# Patient Record
Sex: MALE | Race: WHITE | NOT HISPANIC OR LATINO | Employment: FULL TIME | URBAN - METROPOLITAN AREA
[De-identification: names, ages, dates, MRNs, and addresses within clinical notes are randomized per-mention and may not be internally consistent; named-entity substitution may affect disease eponyms.]

---

## 2024-03-22 ENCOUNTER — OFFICE VISIT (OUTPATIENT)
Dept: HEMATOLOGY/ONCOLOGY | Facility: CLINIC | Age: 44
End: 2024-03-22
Payer: COMMERCIAL

## 2024-03-22 ENCOUNTER — TELEPHONE (OUTPATIENT)
Dept: HEMATOLOGY/ONCOLOGY | Facility: CLINIC | Age: 44
End: 2024-03-22

## 2024-03-22 VITALS
BODY MASS INDEX: 28.61 KG/M2 | WEIGHT: 230.13 LBS | DIASTOLIC BLOOD PRESSURE: 88 MMHG | HEIGHT: 75 IN | RESPIRATION RATE: 18 BRPM | TEMPERATURE: 98 F | HEART RATE: 89 BPM | SYSTOLIC BLOOD PRESSURE: 136 MMHG

## 2024-03-22 DIAGNOSIS — C80.1: Primary | ICD-10-CM

## 2024-03-22 DIAGNOSIS — C20 ADENOCARCINOMA OF RECTUM: ICD-10-CM

## 2024-03-22 DIAGNOSIS — D53.9 NUTRITIONAL ANEMIA: ICD-10-CM

## 2024-03-22 DIAGNOSIS — D50.0 IRON DEFICIENCY ANEMIA DUE TO CHRONIC BLOOD LOSS: ICD-10-CM

## 2024-03-22 PROCEDURE — 3075F SYST BP GE 130 - 139MM HG: CPT | Mod: CPTII,S$GLB,, | Performed by: INTERNAL MEDICINE

## 2024-03-22 PROCEDURE — 3079F DIAST BP 80-89 MM HG: CPT | Mod: CPTII,S$GLB,, | Performed by: INTERNAL MEDICINE

## 2024-03-22 PROCEDURE — 99204 OFFICE O/P NEW MOD 45 MIN: CPT | Mod: S$GLB,,, | Performed by: INTERNAL MEDICINE

## 2024-03-22 PROCEDURE — 3008F BODY MASS INDEX DOCD: CPT | Mod: CPTII,S$GLB,, | Performed by: INTERNAL MEDICINE

## 2024-03-22 RX ORDER — TESTOSTERONE CYPIONATE 200 MG/ML
1 INJECTION, SOLUTION INTRAMUSCULAR
COMMUNITY

## 2024-03-22 RX ORDER — OMEPRAZOLE 40 MG/1
1 CAPSULE, DELAYED RELEASE ORAL DAILY PRN
COMMUNITY
Start: 2024-02-13

## 2024-03-22 RX ORDER — LOSARTAN POTASSIUM 100 MG/1
1 TABLET ORAL DAILY
COMMUNITY
Start: 2024-02-13

## 2024-03-22 NOTE — LETTER
March 30, 2024        Oliver Hatch MD  [facility] [address] [place] [address]             [facility] - Hematology Oncology  [address] [address]  Phone: [phone]  Fax: [phone]   Patient: Yong Arenas   MR Number: [id number]   YOB: 1980   Date of Visit: 3/22/2024       Dear Dr. Hatch:    Thank you for referring Yong Arenas to me for evaluation. Below are the relevant portions of my assessment and plan of care.              If you have questions, please do not hesitate to call me. I look forward to following Yong along with you.    Sincerely,      ELMO Samayoa MD           CC    No Recipients

## 2024-03-25 ENCOUNTER — OFFICE VISIT (OUTPATIENT)
Dept: RADIATION ONCOLOGY | Facility: CLINIC | Age: 44
End: 2024-03-25
Payer: COMMERCIAL

## 2024-03-25 ENCOUNTER — SOCIAL WORK (OUTPATIENT)
Dept: HEMATOLOGY/ONCOLOGY | Facility: CLINIC | Age: 44
End: 2024-03-25

## 2024-03-25 VITALS
OXYGEN SATURATION: 98 % | DIASTOLIC BLOOD PRESSURE: 92 MMHG | WEIGHT: 231.19 LBS | HEART RATE: 80 BPM | SYSTOLIC BLOOD PRESSURE: 134 MMHG | RESPIRATION RATE: 16 BRPM | BODY MASS INDEX: 28.9 KG/M2

## 2024-03-25 DIAGNOSIS — C20 ADENOCARCINOMA OF RECTUM: Primary | ICD-10-CM

## 2024-03-25 DIAGNOSIS — C80.1: ICD-10-CM

## 2024-03-25 PROCEDURE — 99205 OFFICE O/P NEW HI 60 MIN: CPT | Mod: S$GLB,,, | Performed by: RADIOLOGY

## 2024-03-25 PROCEDURE — 1160F RVW MEDS BY RX/DR IN RCRD: CPT | Mod: CPTII,S$GLB,, | Performed by: RADIOLOGY

## 2024-03-25 PROCEDURE — 3075F SYST BP GE 130 - 139MM HG: CPT | Mod: CPTII,S$GLB,, | Performed by: RADIOLOGY

## 2024-03-25 PROCEDURE — 3080F DIAST BP >= 90 MM HG: CPT | Mod: CPTII,S$GLB,, | Performed by: RADIOLOGY

## 2024-03-25 PROCEDURE — 3008F BODY MASS INDEX DOCD: CPT | Mod: CPTII,S$GLB,, | Performed by: RADIOLOGY

## 2024-03-25 PROCEDURE — 1159F MED LIST DOCD IN RCRD: CPT | Mod: CPTII,S$GLB,, | Performed by: RADIOLOGY

## 2024-03-25 NOTE — Clinical Note
Dr. Samayoa-- Met with this young gent. Agree with labs, CEA, MRI (+rectal protocol) and PET. Sounds like ya'll are [place] [facility] [place] Dr. Castellanos for PORT (wife prior pt)? Assume not MMR deficient or metastatic, plan will be for CRT followed by chemo. This is low lying; this will be best sequence to potentially spare him APR :/ I'll be ready by 4/8. Thanks-- Ten

## 2024-03-25 NOTE — PROGRESS NOTES
Yong Arenas  [id number]  1980  3/25/2024  [facility]  [address] [place] [address]    REASON FOR CONSULTATION: cTxNxMx mod diff adenoca of low rectum    TREATMENT GOAL: neoadjuvant    HISTORY OF PRESENT ILLNESS:   Yong Arenas is a 44 y.o. male former smoker, social ETOH drinker who presented with longstanding bright red blood per rectum.    Colonoscopy revealed a villous, fungating, sessile, infiltrative nonobstructing mass in the low rectum measuring 5 cm in length, located just above the anus, partially removed.  Pathology returned moderately differentiated adenocarcinoma arising in tubulovillous adenoma with extensive high-grade dysplasia; MMR status pending.    3/22 Dr. Samayoa (per pt): MRI and PET planned. PORT: Dr. Castellanos. Start date 4/8    CEA:    Caris pending.    Denies fever, chills, chest pain, shortness of breath, cough or hemoptysis.  Continues with bright red blood per rectum.  Denies right upper quadrant pain.  He works in a bakery.  He quit smoking 1 year ago.  He does drink alcohol socially throughout the week.  Presents with his wife who is offering excellent support.    Review of systems otherwise negative unless indicated in HPI.    No past medical history on file.  No past surgical history on file.  Social History     Socioeconomic History    Marital status:    Tobacco Use    Smoking status: Former     Types: Cigarettes    Smokeless tobacco: Never     Family History   Problem Relation Age of Onset    Prostate cancer Father        PRIOR HISTORY OF CHEMOTHERAPY OR RADIOTHERAPY: Please see HPI for patients prior oncologic history.    Medication List with Changes/Refills   Current Medications    LOSARTAN (COZAAR) 100 MG TABLET    Take 1 tablet by mouth once daily.    OMEPRAZOLE (PRILOSEC) 40 MG CAPSULE    Take 1 capsule by mouth once daily.    TESTOSTERONE CYPIONATE (DEPOTESTOTERONE CYPIONATE) 200 MG/ML INJECTION    Inject 1 mL into the muscle every 14  (fourteen) days.     Review of patient's allergies indicates:  No Known Allergies    QUALITY OF LIFE: 100%- Normal, No Complaints, No Evidence of Disease    Vitals:    03/25/24 0821   BP: (!) 134/92   Pulse: 80   Resp: 16   SpO2: 98%   Weight: 104.9 kg (231 lb 3.2 oz)   PainSc:   4   PainLoc: Generalized     Body mass index is 28.9 kg/m².    PHYSICAL EXAM:   GENERAL: alert; in no apparent distress.   HEAD: normocephalic, atraumatic.  EYES: pupils are equal, round, reactive to light and accommodation. Sclera anicteric. Conjunctiva not injected.   NOSE/THROAT: no nasal erythema or rhinorrhea. Oropharynx pink, without erythema, ulcerations or thrush.   NECK: no cervical motion rigidity; supple with no masses.    CHEST: Patient is speaking comfortably on room air with normal work of breathing without using accessory muscles of respiration.  CARDIOVASCULAR: regular rate and rhythm  ABDOMEN: soft, nontender, nondistended.   MUSCULOSKELETAL: no tenderness to palpation along the spine or scapulae. Normal range of motion.  NEUROLOGIC: cranial nerves II-XII intact bilaterally. Strength 5/5 in bilateral upper and lower extremities. No sensory deficits appreciated. Normal gait.  EXTREMITIES: no clubbing, cyanosis, edema.  SKIN: no erythema, rashes, ulcerations noted.     REVIEW OF IMAGING/PATHOLOGY/LABS: Please see HPI. All images reviewed personally by dictating physician.     ASSESSMENT: Yong Arenas is a 44 y.o. male with stage cTxNxMx mod diff adenoca of low rectum  PLAN:  Yong Arenas presents with longstanding history of bright red blood per rectum with colonoscopy revealing a low-lying rectal tumor measuring 5 cm in length, biopsy-proven moderately differentiated adenocarcinoma, MMR intact.  Patient met with Dr. Samayoa and has lab evaluation as well as completion staging studies of MRI (advise rectal protocol) and PET pending.  He will also need to meet with the colorectal surgeon at [facility] and plans to meet with  Dr. Castellanos for PORT placement.    Today our discussion centered around M0 circumstance.  Based on colonoscopy report, assume at least T2-3 tumor and will await MRI to clarify T and N staging.  Tumor appears to be very low-lying, potentially compromising his candidacy for LAR.  He will discuss further with colorectal surgery.  Today we discussed tentative plan for total neoadjuvant therapy.  I discussed sequencing of chemoradiation and full-dose chemotherapy in detail.  Benefits of upfront chemoradiation include control of bleeding and obstructive symptoms with more time for response until surgical evaluation (potentially sparing APR).  This comes up with the consequence of longer time until systemic control by chemotherapy.  Unless extensive adenopathy (or metastases) noted, tentative plan will be for upfront chemoradiation therapy.  Prescription will be 54 Gy in 30 fractions using IMRT to spare the surrounding bowel, bladder, bony pelvis.  Anticipate chemosensitization with 5 FU.  Recommend long course chemoradiation due to increased benefit for sphincter preservation.    Will cc Dr. Samayoa to coordinate care.  Anticipated start date 4/8.  Of note, awaiting MMR status--if deficient, may be a candidate for immunotherapy.    I carefully explained the process of simulation and treatment delivery with weekly physician visits. Patient wishes to proceed.     We discussed the risks and benefits of the above treatment and have gone over in detail the acute and late toxicities of radiation therapy to the pelvis, rectum. The patient expressed  understanding and has signed a consent form which is included in the patients chart.      The patient has our contact information and understands that they are free to contact us at any time with questions or concerns regarding radiation therapy.     DISPOSITION: RTC FOR CT SIM     I have personally seen and evaluated this patient with a high complexity diagnosis.      Greater than 60  minutes were dedicated to reviewing/interpreting pertinent laboratory/imaging/pathology as well as prior consultations; reviewing and performing history and physical; counseling patient on oncologic recommendations; documentation in the electronic medical record including ordering of additional tests and/or radiation treatment protocol; and coordination of care with physicians with referrals placed as appropriate.    PHYSICIAN: Tucker Arellano Jr, MD    Thank you for the opportunity to meet and consult with Yong Arenas.   Please feel free to contact me to discuss the above recommendation further.

## 2024-03-25 NOTE — PROGRESS NOTES
Yong Arenas is a 44 year old diagnosed with adenocarcinoma of rectum.  Patient is here today, accompanied by his wife, for a radiation consult with Dr. Arellano.  I met with patient to complete new patient orientation and the NCCN Distress Screening; patient indicated a rating of 0.  Patient was provided the number to access the [facility] financial assistance application.  Patient denied needing psychosocial support at this time.  I provided patient with the [facility] community events flyer and my contact information in the event supportive services are needed in the future.

## 2024-03-27 ENCOUNTER — TELEPHONE (OUTPATIENT)
Dept: HEMATOLOGY/ONCOLOGY | Facility: CLINIC | Age: 44
End: 2024-03-27

## 2024-03-27 DIAGNOSIS — C20 ADENOCARCINOMA OF RECTUM: Primary | ICD-10-CM

## 2024-03-27 NOTE — TELEPHONE ENCOUNTER
Spoke with pts spouse and informed her that I gave Dr Samayoa her message from yesterday and that I am waiting on orders from him and that I will get back with her ASAP. Advised that pts radiation appt can always be moved. Spouse verbalized understanding.

## 2024-03-30 ENCOUNTER — TELEPHONE (OUTPATIENT)
Dept: HEMATOLOGY/ONCOLOGY | Facility: CLINIC | Age: 44
End: 2024-03-30

## 2024-03-30 NOTE — PROGRESS NOTES
[facility] In Office Hematology Oncology Initial Encounter Note    3/22/24    Subjective:      Patient ID:   Yong Arenas  44 y.o. male  1980  Adan Hatch, Mariella Goldsmith Gray, MD's  [facility]      Chief Complaint:   Rectal cancer    HPI:  44 y.o. male has had rectal bleeding over the last 2-3 years.  BMs have become smaller.  He presented with symptoms of a prolapsed mass.  He had colonoscopy and was found to have a large mass at the rectum.  Biopsy returned moderately differentiated invasive adenocarcinoma arising from a tubulovillous adenoma.    Appetite is intact, weight is stable, he complains of easy fatigue.    Medications include Cozaar for hypertension, Prilosec for GERD, he did have his esophagus dilated approximately 4 years ago.  He does take testosterone injections q.2 weeks over the last 1-2 years per his primary physician Dr. Hatch.  He had 2 of 4 wisdom teeth removed in the past and a root canal x2.  He has not had any surgeries.      He smokes less than a half pack per day for 25 years, none x1 year.  He drinks beer, vodka, bourbon.  He denies allergies to medications.  He is a  and ulnar at [employer].  He does drink alkaline water.    He is traveling to [place] July 20th through July 24th.    His father had history of polyps, prostate cancer treated with radiation seeds.  His mother had cancer on her leg with swelling and was told that she had a liposarcoma.  He has a niece with history of leukemia who had stem cell transplant at age 22, she is 24 now.  He has 3 sisters alive and well and 2 sons alive and well age 23 and 21.    ROS:   GEN: normal without any fever, night sweats or weight loss  HEENT: normal with no HA's, sore throat, stiff neck, changes in vision  CV: normal with no CP, SOB, PND, PERRY or orthopnea  PULM: normal with no SOB, cough, hemoptysis, sputum or pleuritic pain  GI: See HPI  : normal with no hematuria, dysuria  BREAST: normal with no  "mass, discharge, pain  SKIN: normal with no rash, erythema, bruising, or swelling      Social History     Socioeconomic History    Marital status:    Tobacco Use    Smoking status: Former     Types: Cigarettes    Smokeless tobacco: Never         Current Outpatient Medications:     losartan (COZAAR) 100 MG tablet, Take 1 tablet by mouth once daily., Disp: , Rfl:     omeprazole (PRILOSEC) 40 MG capsule, Take 1 capsule by mouth once daily., Disp: , Rfl:     testosterone cypionate (DEPOTESTOTERONE CYPIONATE) 200 mg/mL injection, Inject 1 mL into the muscle every 14 (fourteen) days., Disp: , Rfl:           Objective:   Vitals:  Blood pressure 136/88, pulse 89, temperature 98 °F (36.7 °C), resp. rate 18, height 6' 3" (1.905 m), weight 104.4 kg (230 lb 1.6 oz).    Physical Examination:   GEN: no apparent distress, comfortable  HEAD: atraumatic and normocephalic  EYES: no pallor, no icteru  ENT: no pharyngeal erythema, external ears WNL; no nasal discharge  NECK: no masses, thyroid normal, trachea midline, no LAD/LN's, supple  CV: RRR with no murmur; normal pulse; normal S1 and S2; no pedal edema  CHEST: Normal respiratory effort; CTAB; normal breath sounds; no wheeze or crackles  ABDOM: nontender and nondistended; soft; no rebound/guarding, L/S NP   MUSC/Skeletal: ROM normal; no crepitus; joints normal   EXTREM: no clubbing, cyanosis, inflammation or swelling  SKIN: no rashes, lesions, ulcers, petechiae   : no cvat  NEURO: grossly intact; motor/sensory WNL;  no tremors  PSYCH: normal mood, affect and behavior  LYMPH: normal cervical, supraclavicular, axillary and groin LN's  BREASTS: L & R no palpable mass    Colonoscopy report from March 18, 2024 revealed a large frond like villous fungating mass at the rectum, 5 cm in length, incomplete resection was done, the lesion was just above the anus.  A small polyp was removed from the cecum and 3 small polyps were removed from the sigmoid colon follow-up colonoscopy is " due in 1 year.      Pathology report from [facility] dated March 19, 2024   [id number].  Small polyps were tubular adenoma and hyperplastic polyps.  The rectal lesion was moderately differentiated invasive adenocarcinoma arising from tubulovillous adenoma with extensive high-grade dysplasia.    PET scan and MRI scan of the pelvis have been ordered at [facility] imaging.  Assessment:   (1) 44 y.o. 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    (2) staging studies will include PET scan to check for metastatic disease, and to evaluate for lymphadenopathy in the pelvis.  MRI scan of the pelvis is being done to evaluate the local extent of the primary tumor in the pelvis.  CBC, CMP, CEA, ferritin, B12, and folate have been ordered.    (3) staging of the rectal cancer is in progress with the PET scan and MRI scan and will be determined clinically after the results of the studies are confirmed.    (4) treatment options can include low anterior resection and abdominal peritoneal resection, radiation will also be involved for local control, and chemotherapy will be given as a radiation sensitizer and also to try and decrease risk of systemic recurrence adjuvantly or katy adjuvantly.  Next generation sequencing studies to determine a role for immune oncology will be submitted.    (5) in the past surgery was usually done initially, followed by adjuvant radiation therapy and chemotherapy concurrently for local control, followed by systemic chemotherapy x6 months adjuvantly to reduce systemic recurrence.    In the last several years, our  treatment approach has changed, with Neoadjuvant radiation therapy and concurrent chemotherapy infusion being given initially, followed by Neoadjuvant  systemic chemotherapy q.2 weeks times 6-12 cycles, followed by surgical re-evaluation at a later date to include low anterior resection, abdominal  peritoneal resection, or observation.    He has an appointment to see Dr. Brandon Wolff, the surgical oncologist, at [facility].  Will make a referral for him to see Dr. Arellano of radiation therapy.  Dr. Tash Castellanos will place his port.    Anticipated start date would be April 8, 2024.  RT 4/5/24.

## 2024-04-02 ENCOUNTER — HOSPITAL ENCOUNTER (OUTPATIENT)
Dept: RADIOLOGY | Facility: HOSPITAL | Age: 44
Discharge: HOME | End: 2024-04-02
Attending: INTERNAL MEDICINE
Payer: COMMERCIAL

## 2024-04-02 DIAGNOSIS — C20 ADENOCARCINOMA OF RECTUM: ICD-10-CM

## 2024-04-02 LAB — GLUCOSE SERPL-MCNC: 82 MG/DL (ref 70–110)

## 2024-04-02 PROCEDURE — 78815 PET IMAGE W/CT SKULL-THIGH: CPT | Mod: TC,PN

## 2024-04-02 PROCEDURE — A9552 F18 FDG: HCPCS | Mod: PN | Performed by: INTERNAL MEDICINE

## 2024-04-02 PROCEDURE — 78815 PET IMAGE W/CT SKULL-THIGH: CPT | Mod: 26,PI,, | Performed by: RADIOLOGY

## 2024-04-02 PROCEDURE — A9585 GADOBUTROL INJECTION: HCPCS | Performed by: INTERNAL MEDICINE

## 2024-04-02 PROCEDURE — 25500020 PHARM REV CODE 255: Performed by: INTERNAL MEDICINE

## 2024-04-02 PROCEDURE — 72197 MRI PELVIS W/O & W/DYE: CPT | Mod: TC

## 2024-04-02 RX ORDER — FLUDEOXYGLUCOSE F18 500 MCI/ML
12 INJECTION INTRAVENOUS
Status: COMPLETED | OUTPATIENT
Start: 2024-04-02 | End: 2024-04-02

## 2024-04-02 RX ORDER — GADOBUTROL 604.72 MG/ML
9.5 INJECTION INTRAVENOUS
Status: COMPLETED | OUTPATIENT
Start: 2024-04-02 | End: 2024-04-02

## 2024-04-02 RX ADMIN — FLUDEOXYGLUCOSE F-18 13.2 MILLICURIE: 500 INJECTION INTRAVENOUS at 07:04

## 2024-04-02 RX ADMIN — GADOBUTROL 9.5 ML: 604.72 INJECTION INTRAVENOUS at 06:04

## 2024-04-02 NOTE — H&P (VIEW-ONLY)
Yong Arenas is an 44 y.o. male.  Patient with bloody stools.  Colonoscopy revealed carcinoma of the rectum at 5 cm.  He has been preoperatively seen by Dr. Arellano and Dr. Calabrese.  PET-CT and MRI done today, findings unknown  To be scheduled for combination chemotherapy radiation therapy preoperatively  Patient seen for port placement    Past Medical History:   Diagnosis Date    Adenocarcinoma of rectum (CMS/HCC)     Dysphagia        Allergies: Not on File    Active Problems:    * No active hospital problems. *    Blood pressure 133/87, pulse 84, resp. rate 18, weight 104.4 kg (230 lb 2.2 oz), SpO2 96%.    Review of Systems   Respiratory:  Negative for shortness of breath.    Cardiovascular:  Negative for chest pain.   Gastrointestinal:  Negative for abdominal pain.   Genitourinary:  Negative for difficulty urinating.       Physical Exam  Eyes:      Pupils: Pupils are equal, round, and reactive to light.   Cardiovascular:      Rate and Rhythm: Normal rate and regular rhythm.   Pulmonary:      Effort: Pulmonary effort is normal.      Breath sounds: Normal breath sounds.   Abdominal:      Palpations: Abdomen is soft.   Musculoskeletal:         General: Normal range of motion.      Cervical back: Neck supple.   Skin:     General: Skin is warm and dry.   Neurological:      Mental Status: He is alert and oriented to person, place, and time.         Assessment:  Patient with carcinoma of the rectum for preop neoadjuvant chemotherapy radiation protocol therapy.  Will place port for chemotherapy access Tuesday morning at [facility] on leave needle in place for same-day therapy      Plan:  Scheduled for port placement [facility] April 9th.  Needle be left in place so therapy can be instituted April 9th    Tash Castellanos  4/2/2024

## 2024-04-02 NOTE — H&P (VIEW-ONLY)
Yong Arenas is an 44 y.o. male.  Patient with bloody stools.  Colonoscopy revealed carcinoma of the rectum at 5 cm.  He has been preoperatively seen by Dr. Arellano and Dr. Calabrese.  PET-CT and MRI done today, findings unknown  To be scheduled for combination chemotherapy radiation therapy preoperatively  Patient seen for port placement    Past Medical History:   Diagnosis Date    Adenocarcinoma of rectum (CMS/HCC)     Dysphagia        Allergies: Not on File    Active Problems:    * No active hospital problems. *    Blood pressure 133/87, pulse 84, resp. rate 18, weight 104.4 kg (230 lb 2.2 oz), SpO2 96%.    Review of Systems   Respiratory:  Negative for shortness of breath.    Cardiovascular:  Negative for chest pain.   Gastrointestinal:  Negative for abdominal pain.   Genitourinary:  Negative for difficulty urinating.       Physical Exam  Eyes:      Pupils: Pupils are equal, round, and reactive to light.   Cardiovascular:      Rate and Rhythm: Normal rate and regular rhythm.   Pulmonary:      Effort: Pulmonary effort is normal.      Breath sounds: Normal breath sounds.   Abdominal:      Palpations: Abdomen is soft.   Musculoskeletal:         General: Normal range of motion.      Cervical back: Neck supple.   Skin:     General: Skin is warm and dry.   Neurological:      Mental Status: He is alert and oriented to person, place, and time.         Assessment:  Patient with carcinoma of the rectum for preop neoadjuvant chemotherapy radiation protocol therapy.  Will place port for chemotherapy access Tuesday morning at [facility] on leave needle in place for same-day therapy      Plan:  Scheduled for port placement [facility] April 9th.  Needle be left in place so therapy can be instituted April 9th    Tash Castellanos  4/2/2024

## 2024-04-02 NOTE — PROGRESS NOTES
PET Imaging Questionnaire    Are you a Diabetic? Recent Blood Sugar level? No    Are you anemic? Bone Marrow Stimulation Meds? No    Have you had a CT Scan, if so when & where was your last one? No    Have you had a PET Scan, if so when & where was your last one? No    Chemotherapy or currently on Chemotherapy? No    Radiation therapy? No    Surgical History: No past surgical history on file.     Have you been fasting for at least 6 hours? Yes    Is there any chance you may be pregnant or breastfeeding? No    Assay: 15.1 MCi@:7.49   Injection Site:lt ac     Residual: 1.92 mCi@: 7.51   Technologist: Caty Berry Injected:13.2mCi

## 2024-04-03 ENCOUNTER — DOCUMENTATION ONLY (OUTPATIENT)
Dept: RADIATION ONCOLOGY | Facility: CLINIC | Age: 44
End: 2024-04-03

## 2024-04-03 ENCOUNTER — HOSPITAL ENCOUNTER (OUTPATIENT)
Dept: PREADMISSION TESTING | Facility: HOSPITAL | Age: 44
Discharge: HOME | End: 2024-04-03
Attending: SURGERY
Payer: COMMERCIAL

## 2024-04-03 VITALS
BODY MASS INDEX: 28.6 KG/M2 | HEART RATE: 87 BPM | SYSTOLIC BLOOD PRESSURE: 131 MMHG | OXYGEN SATURATION: 97 % | WEIGHT: 230 LBS | DIASTOLIC BLOOD PRESSURE: 89 MMHG | TEMPERATURE: 98 F | HEIGHT: 75 IN | RESPIRATION RATE: 18 BRPM

## 2024-04-03 DIAGNOSIS — Z01.818 PREOP TESTING: Primary | ICD-10-CM

## 2024-04-03 PROCEDURE — 93010 ELECTROCARDIOGRAM REPORT: CPT | Mod: ,,, | Performed by: GENERAL PRACTICE

## 2024-04-03 PROCEDURE — 93005 ELECTROCARDIOGRAM TRACING: CPT | Performed by: GENERAL PRACTICE

## 2024-04-03 RX ORDER — IBUPROFEN 200 MG
200 TABLET ORAL EVERY 6 HOURS PRN
COMMUNITY

## 2024-04-03 RX ORDER — CEFAZOLIN SODIUM 2 G/50ML
2 SOLUTION INTRAVENOUS ONCE
Status: CANCELLED | OUTPATIENT
Start: 2024-04-03

## 2024-04-03 RX ORDER — LORATADINE AND PSEUDOEPHEDRINE SULFATE 5; 120 MG/1; MG/1
TABLET, EXTENDED RELEASE ORAL DAILY
COMMUNITY

## 2024-04-03 NOTE — DISCHARGE INSTRUCTIONS
To confirm, Your doctor has instructed you that surgery is scheduled for:  Tuesday 4/9/24    Pre-Op will call the afternoon prior to surgery between 4:00 and 6:00 PM with the final arrival time.  Monday 4/8/24     Please report to Registration at front of the hospital --it is best to park in the garage on [address]    Do not eat or drink anything after midnight the night before your surgery - THIS INCLUDES  WATER, GUM, MINTS AND CANDY.    YOU MAY BRUSH YOUR TEETH     TAKE APPROVED  MEDICATIONS WITH A SMALL SIP OF WATER THE MORNING OF YOUR PROCEDURE: See Medication list    PLEASE NOTE:  The surgery schedule has many variables which may affect the time of your surgery case.  Family members should be available if your surgery time changes.  Plan to be here the day of your procedure between 4-6 hours.    DO NOT TAKE THESE MEDICATIONS 5-7 DAYS PRIOR to your procedure or per your surgeon's request: ASPIRIN, ALEVE, ADVIL, IBUPROFEN,  ESTEFANY SELTZER, BC , FISH OIL , VITAMIN E, HERBALS  (May take Tylenol)        IMPORTANT INSTRUCTIONS    Do not smoke, vape or drink alcoholic beverages 24 hours prior to your procedure.  Shower the night before AND the morning of your procedure with a Chlorhexidine wash such as Hibiclens or Dial antibacterial soap from the neck down.    Do not get it on your face or in your eyes.  You may use your own shampoo and face wash. This helps your skin to be as bacteria free as possible.   Do not apply any deodorant, lotion or powder after you shower.   DO NOT remove hair from the surgery site.  Do not shave the incision site unless you are given specific instructions to do so.    Sleep in a bed with clean sheets.  Do not sleep with a pet in the bed.   If you wear contact lenses, dentures, hearing aids or glasses, bring a container to put them in during surgery and give to a family member for safe keeping.    Please leave all jewelry, piercing's and valuables at home.   Wear comfortable clothing that  is easy to remove and place back on after surgery.     Make arrangements in advance for transportation home by a responsible adult.    You must make arrangements for transportation, TAXI'S, UBER'S OR LYFTS ARE NOT ALLOWED.      If you have any questions about these instructions, call Pre-Op Admit  Nursing at [phone] , Pre-Op Day Surgery Unit at [phone]

## 2024-04-03 NOTE — PROGRESS NOTES
Pt called per Dr Calabrese to cancel sim  Dr Beck explained to pt wife about liver spots seen on scan and the need to change the treatment plan  Wife verbalized understanding

## 2024-04-03 NOTE — PRE ADMISSION SCREENING
Preadmit assessment complete. Review of patient health history and home medications. Questions answered. Patient/wife  voiced understanding. Chlorhexidine scrub given to patient for preop shower Preop education per AVS

## 2024-04-05 ENCOUNTER — OFFICE VISIT (OUTPATIENT)
Dept: HEMATOLOGY/ONCOLOGY | Facility: CLINIC | Age: 44
End: 2024-04-05
Payer: COMMERCIAL

## 2024-04-05 VITALS
WEIGHT: 227.13 LBS | BODY MASS INDEX: 28.24 KG/M2 | DIASTOLIC BLOOD PRESSURE: 87 MMHG | TEMPERATURE: 99 F | HEIGHT: 75 IN | RESPIRATION RATE: 16 BRPM | SYSTOLIC BLOOD PRESSURE: 136 MMHG | HEART RATE: 92 BPM

## 2024-04-05 DIAGNOSIS — C20 ADENOCARCINOMA OF RECTUM: Primary | ICD-10-CM

## 2024-04-05 DIAGNOSIS — K76.9 LIVER DISEASE, UNSPECIFIED: ICD-10-CM

## 2024-04-05 DIAGNOSIS — R93.2 ABNORMAL PET SCAN OF LIVER: ICD-10-CM

## 2024-04-05 PROCEDURE — 3079F DIAST BP 80-89 MM HG: CPT | Mod: CPTII,S$GLB,, | Performed by: INTERNAL MEDICINE

## 2024-04-05 PROCEDURE — 3075F SYST BP GE 130 - 139MM HG: CPT | Mod: CPTII,S$GLB,, | Performed by: INTERNAL MEDICINE

## 2024-04-05 PROCEDURE — 99214 OFFICE O/P EST MOD 30 MIN: CPT | Mod: S$GLB,,, | Performed by: INTERNAL MEDICINE

## 2024-04-05 PROCEDURE — 3008F BODY MASS INDEX DOCD: CPT | Mod: CPTII,S$GLB,, | Performed by: INTERNAL MEDICINE

## 2024-04-05 PROCEDURE — 1159F MED LIST DOCD IN RCRD: CPT | Mod: CPTII,S$GLB,, | Performed by: INTERNAL MEDICINE

## 2024-04-05 NOTE — PROGRESS NOTES
[facility] In Office Hematology Oncology Subsequent Encounter Note    4/5/24    Subjective:      Patient ID:   Yong Arenas  44 y.o. male  1980  Adan Hatch Albright, [place], Gray, MD's  [facility]      Chief Complaint:   Rectal cancer    HPI:  44 y.o. male has had rectal bleeding over the last 2-3 years.  BMs have become smaller.  He presented with symptoms of a prolapsed mass.  He had colonoscopy and was found to have a large mass at the rectum.  Biopsy returned moderately differentiated invasive adenocarcinoma arising from a tubulovillous adenoma.  MRI of the rectum showed this 3.8 cm polypoid lesion, probably the primary focus of malignancy.  There was thickening of the posterior and left lateral rectal wall.  Pelvic lymph node was up to 5 mm in size, pelvic lymphadenopathy was not seen.  The MRI scan was negative for significant local invasion.  PET scan showed low rectal carcinoma highly suspicious for metastatic disease to the liver tiny left pararectal lymph node is suspicious based on morphology but too small to characterize with PET.  A lesion is seen subcapsular in the dome of the liver with an SUV of 3.9.  Another lesion is seen in the left lobe of the liver at 1.5 cm and has an SUV of 4.3.    Will see if we can schedule an MRI of the liver for Wednesday, and see if the radiologist can biopsy 1 of these lesions for tissue diagnosis to confirm the presence of liver metastases.  At this point clinically it would appear to be stage IV disease.  Previously discussed with Dr. Beck.  For stage IV disease, would plan to go forward with FOLFIRI or FOLFOX with Avastin or Vectibix initially for 4-6 cycles, then evaluate for response, consider hepatic metastasectomy potentially.  MRI of liver may give us a more accurate number of liver metastases?    Radiation to the rectal mass and infusional 5 FU is deferred at this time until we get the liver MRI and biopsy if feasible and  clarify if we have stage IV disease here?    Appetite is intact, weight is stable, he complains of easy fatigue.    Medications include Cozaar for hypertension, Prilosec for GERD, he did have his esophagus dilated approximately 4 years ago.  He does take testosterone injections q.2 weeks over the last 1-2 years per his primary physician Dr. Hatch.  He had 2 of 4 wisdom teeth removed in the past and a root canal x2.  He has not had any surgeries.      He smokes less than a half pack per day for 25 years, none x1 year.  He drinks beer, vodka, bourbon.  He denies allergies to medications.  He is a  and ulnar at [employer].  He does drink alkaline water.    He is traveling to [place]  through .    His father had history of polyps, prostate cancer treated with radiation seeds.  His mother had cancer on her leg with swelling and was told that she had a liposarcoma.  He has a niece with history of leukemia who had stem cell transplant at age 22, she is 24 now.  He has 3 sisters alive and well and 2 sons alive and well age 23 and 21.    ROS:   GEN: normal without any fever, night sweats or weight loss  HEENT: normal with no HA's, sore throat, stiff neck, changes in vision  CV: normal with no CP, SOB, PND, PERRY or orthopnea  PULM: normal with no SOB, cough, hemoptysis, sputum or pleuritic pain  GI: See HPI  : normal with no hematuria, dysuria  BREAST: normal with no mass, discharge, pain  SKIN: normal with no rash, erythema, bruising, or swelling      Social History     Socioeconomic History    Marital status:    Tobacco Use    Smoking status: Former     Types: Cigarettes     Start date:      Quit date:      Years since quittin.2    Smokeless tobacco: Never   Substance and Sexual Activity    Alcohol use: Not Currently     Comment: social    Drug use: Never         Current Outpatient Medications:     ibuprofen (ADVIL,MOTRIN) 200 MG tablet, Take 200 mg by mouth every 6 (six) hours  "as needed for Pain., Disp: , Rfl:     loratadine-pseudoephedrine 5-120 mg (CLARITIN-D 12 HOUR) 5-120 mg per tablet, Take by mouth once daily., Disp: , Rfl:     losartan (COZAAR) 100 MG tablet, Take 1 tablet by mouth once daily., Disp: , Rfl:     omeprazole (PRILOSEC) 40 MG capsule, Take 1 capsule by mouth daily as needed., Disp: , Rfl:     testosterone cypionate (DEPOTESTOTERONE CYPIONATE) 200 mg/mL injection, Inject 1 mL into the muscle every 14 (fourteen) days., Disp: , Rfl:           Objective:   Vitals:  Blood pressure 136/87, pulse 92, temperature 98.6 °F (37 °C), resp. rate 16, height 6' 3" (1.905 m), weight 103 kg (227 lb 1.6 oz).    Physical Examination:   GEN: no apparent distress, comfortable  HEAD: atraumatic and normocephalic  EYES: no pallor, no icteru  ENT: no pharyngeal erythema, external ears WNL; no nasal discharge  NECK: no masses, thyroid normal, trachea midline, no LAD/LN's, supple  CV: RRR with no murmur; normal pulse; normal S1 and S2; no pedal edema  CHEST: Normal respiratory effort; CTAB; normal breath sounds; no wheeze or crackles  ABDOM: nontender and nondistended; soft; no rebound/guarding, L/S NP   MUSC/Skeletal: ROM normal; no crepitus; joints normal   EXTREM: no clubbing, cyanosis, inflammation or swelling  SKIN: no rashes, lesions, ulcers, petechiae   : no cvat  NEURO: grossly intact; motor/sensory WNL;  no tremors  PSYCH: normal mood, affect and behavior  LYMPH: normal cervical, supraclavicular, axillary and groin LN's  BREASTS: L & R no palpable mass    Colonoscopy report from March 18, 2024 revealed a large frond like villous fungating mass at the rectum, 5 cm in length, incomplete resection was done, the lesion was just above the anus.  A small polyp was removed from the cecum and 3 small polyps were removed from the sigmoid colon follow-up colonoscopy is due in 1 year.      Pathology report from [facility] dated March 19, 2024   [id number].  Small polyps were tubular " adenoma and hyperplastic polyps.  The rectal lesion was moderately differentiated invasive adenocarcinoma arising from tubulovillous adenoma with extensive high-grade dysplasia.    PET scan and MRI scan of the pelvis have been ordered at [facility] imaging.  Assessment:   (1) 44 y.o. 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    (2) staging studies will include PET scan to check for metastatic disease, and to evaluate for lymphadenopathy in the pelvis.  MRI scan of the pelvis is being done to evaluate the local extent of the primary tumor in the pelvis.  CBC, CMP, CEA, ferritin, B12, and folate have been ordered.    (3) staging of the rectal cancer is in progress with the PET scan and MRI scan and will be determined clinically after the results of the studies are confirmed.    (4) treatment options can include low anterior resection and abdominal peritoneal resection, radiation will also be involved for local control, and chemotherapy will be given as a radiation sensitizer and also to try and decrease risk of systemic recurrence adjuvantly or katy adjuvantly.  Next generation sequencing studies to determine a role for immune oncology will be submitted.    (5) in the past surgery was usually done initially, followed by adjuvant radiation therapy and chemotherapy concurrently for local control, followed by systemic chemotherapy x6 months adjuvantly to reduce systemic recurrence.    In the last several years, our  treatment approach has changed, with Neoadjuvant radiation therapy and concurrent chemotherapy infusion being given initially, followed by Neoadjuvant  systemic chemotherapy q.2 weeks times 6-12 cycles, followed by surgical re-evaluation at a later date to include low anterior resection, abdominal peritoneal resection, or observation.    He has an appointment to see Dr. Brandon Wolff, the surgical oncologist, at [facility].  Will make a referral for him to see Dr. Arellano of radiation therapy.  Dr. Tash Castellanos will place his port 4/9/24.    Anticipated start date would be week of April 15, 2024?   RTC 4/12/24.

## 2024-04-07 ENCOUNTER — TELEPHONE (OUTPATIENT)
Dept: HEMATOLOGY/ONCOLOGY | Facility: CLINIC | Age: 44
End: 2024-04-07

## 2024-04-07 DIAGNOSIS — C20 ADENOCARCINOMA OF RECTUM: Primary | ICD-10-CM

## 2024-04-08 ENCOUNTER — TELEPHONE (OUTPATIENT)
Dept: HEMATOLOGY/ONCOLOGY | Facility: CLINIC | Age: 44
End: 2024-04-08

## 2024-04-08 ENCOUNTER — TELEPHONE (OUTPATIENT)
Dept: SURGERY | Facility: CLINIC | Age: 44
End: 2024-04-08
Payer: COMMERCIAL

## 2024-04-08 DIAGNOSIS — C20 ADENOCARCINOMA OF RECTUM: Primary | ICD-10-CM

## 2024-04-08 DIAGNOSIS — R93.2 ABNORMAL PET SCAN, LIVER: ICD-10-CM

## 2024-04-08 DIAGNOSIS — C78.7 RECTAL CANCER METASTASIZED TO LIVER: Primary | ICD-10-CM

## 2024-04-08 DIAGNOSIS — C20 RECTAL CANCER METASTASIZED TO LIVER: Primary | ICD-10-CM

## 2024-04-08 NOTE — TELEPHONE ENCOUNTER
Order placed in Epic for MRI of liver for 4/10/24.    Order placed in Epic for Int. Radiologist to do Bx of liver mass 4/10/24, after the MRI is reviewed.    See if I can talk with Dr. Walker or [place]?    See if you can get this arranged for 4/10/24.  He is getting port placed 4/9/24, Dr. Castellanos.    He wll need PT PTT CEA done [facility] lab  Before Bx of 4/10/24.

## 2024-04-09 ENCOUNTER — TELEPHONE (OUTPATIENT)
Dept: SURGERY | Facility: CLINIC | Age: 44
End: 2024-04-09
Payer: COMMERCIAL

## 2024-04-09 ENCOUNTER — ANESTHESIA EVENT (OUTPATIENT)
Dept: SURGERY | Facility: HOSPITAL | Age: 44
End: 2024-04-09
Payer: COMMERCIAL

## 2024-04-09 ENCOUNTER — HOSPITAL ENCOUNTER (OUTPATIENT)
Facility: HOSPITAL | Age: 44
Discharge: HOME | End: 2024-04-09
Attending: SURGERY | Admitting: SURGERY
Payer: COMMERCIAL

## 2024-04-09 ENCOUNTER — ANESTHESIA (OUTPATIENT)
Dept: SURGERY | Facility: HOSPITAL | Age: 44
End: 2024-04-09
Payer: COMMERCIAL

## 2024-04-09 ENCOUNTER — TELEPHONE (OUTPATIENT)
Dept: HEMATOLOGY/ONCOLOGY | Facility: CLINIC | Age: 44
End: 2024-04-09

## 2024-04-09 ENCOUNTER — TELEPHONE (OUTPATIENT)
Dept: RADIOLOGY | Facility: HOSPITAL | Age: 44
End: 2024-04-09

## 2024-04-09 VITALS
DIASTOLIC BLOOD PRESSURE: 91 MMHG | TEMPERATURE: 98 F | HEART RATE: 64 BPM | RESPIRATION RATE: 16 BRPM | SYSTOLIC BLOOD PRESSURE: 142 MMHG | OXYGEN SATURATION: 98 %

## 2024-04-09 DIAGNOSIS — C20 ADENOCARCINOMA OF RECTUM: Primary | ICD-10-CM

## 2024-04-09 DIAGNOSIS — Z01.818 PREOP TESTING: ICD-10-CM

## 2024-04-09 PROCEDURE — 71000015 HC POSTOP RECOV 1ST HR: Performed by: SURGERY

## 2024-04-09 PROCEDURE — 63600175 PHARM REV CODE 636 W HCPCS: Performed by: NURSE ANESTHETIST, CERTIFIED REGISTERED

## 2024-04-09 PROCEDURE — 37000009 HC ANESTHESIA EA ADD 15 MINS: Performed by: SURGERY

## 2024-04-09 PROCEDURE — 36000706: Performed by: SURGERY

## 2024-04-09 PROCEDURE — C1894 INTRO/SHEATH, NON-LASER: HCPCS | Performed by: SURGERY

## 2024-04-09 PROCEDURE — 37000008 HC ANESTHESIA 1ST 15 MINUTES: Performed by: SURGERY

## 2024-04-09 PROCEDURE — 25000003 PHARM REV CODE 250: Performed by: NURSE ANESTHETIST, CERTIFIED REGISTERED

## 2024-04-09 PROCEDURE — 36000707: Performed by: SURGERY

## 2024-04-09 PROCEDURE — D9220A PRA ANESTHESIA: Mod: ANES,,, | Performed by: ANESTHESIOLOGY

## 2024-04-09 PROCEDURE — 63600175 PHARM REV CODE 636 W HCPCS: Performed by: SURGERY

## 2024-04-09 PROCEDURE — C1788 PORT, INDWELLING, IMP: HCPCS | Performed by: SURGERY

## 2024-04-09 PROCEDURE — 25000003 PHARM REV CODE 250: Performed by: SURGERY

## 2024-04-09 PROCEDURE — 71000033 HC RECOVERY, INTIAL HOUR: Performed by: SURGERY

## 2024-04-09 PROCEDURE — D9220A PRA ANESTHESIA: Mod: CRNA,,, | Performed by: NURSE ANESTHETIST, CERTIFIED REGISTERED

## 2024-04-09 PROCEDURE — 25000003 PHARM REV CODE 250: Performed by: ANESTHESIOLOGY

## 2024-04-09 PROCEDURE — 63600175 PHARM REV CODE 636 W HCPCS: Performed by: ANESTHESIOLOGY

## 2024-04-09 PROCEDURE — 71000039 HC RECOVERY, EACH ADD'L HOUR: Performed by: SURGERY

## 2024-04-09 DEVICE — KIT POWERPORT SINGLE 8FR: Type: IMPLANTABLE DEVICE | Site: CHEST | Status: FUNCTIONAL

## 2024-04-09 RX ORDER — MEPERIDINE HYDROCHLORIDE 50 MG/ML
12.5 INJECTION INTRAMUSCULAR; INTRAVENOUS; SUBCUTANEOUS EVERY 10 MIN PRN
Status: DISCONTINUED | OUTPATIENT
Start: 2024-04-09 | End: 2024-04-09 | Stop reason: HOSPADM

## 2024-04-09 RX ORDER — ONDANSETRON HYDROCHLORIDE 2 MG/ML
INJECTION, SOLUTION INTRAVENOUS
Status: DISCONTINUED | OUTPATIENT
Start: 2024-04-09 | End: 2024-04-09

## 2024-04-09 RX ORDER — PROPOFOL 10 MG/ML
VIAL (ML) INTRAVENOUS
Status: DISCONTINUED | OUTPATIENT
Start: 2024-04-09 | End: 2024-04-09

## 2024-04-09 RX ORDER — FAMOTIDINE 10 MG/ML
INJECTION INTRAVENOUS
Status: DISCONTINUED | OUTPATIENT
Start: 2024-04-09 | End: 2024-04-09

## 2024-04-09 RX ORDER — ACETAMINOPHEN 325 MG/1
650 TABLET ORAL EVERY 4 HOURS PRN
Status: DISCONTINUED | OUTPATIENT
Start: 2024-04-09 | End: 2024-04-09 | Stop reason: HOSPADM

## 2024-04-09 RX ORDER — FENTANYL CITRATE 50 UG/ML
25 INJECTION, SOLUTION INTRAMUSCULAR; INTRAVENOUS EVERY 5 MIN PRN
Status: COMPLETED | OUTPATIENT
Start: 2024-04-09 | End: 2024-04-09

## 2024-04-09 RX ORDER — CEFAZOLIN SODIUM 2 G/50ML
2 SOLUTION INTRAVENOUS ONCE
Status: COMPLETED | OUTPATIENT
Start: 2024-04-09 | End: 2024-04-09

## 2024-04-09 RX ORDER — HEPARIN SODIUM 1000 [USP'U]/ML
INJECTION, SOLUTION INTRAVENOUS; SUBCUTANEOUS
Status: DISCONTINUED | OUTPATIENT
Start: 2024-04-09 | End: 2024-04-09 | Stop reason: HOSPADM

## 2024-04-09 RX ORDER — MIDAZOLAM HYDROCHLORIDE 1 MG/ML
INJECTION INTRAMUSCULAR; INTRAVENOUS
Status: DISCONTINUED | OUTPATIENT
Start: 2024-04-09 | End: 2024-04-09

## 2024-04-09 RX ORDER — SODIUM CHLORIDE, SODIUM LACTATE, POTASSIUM CHLORIDE, CALCIUM CHLORIDE 600; 310; 30; 20 MG/100ML; MG/100ML; MG/100ML; MG/100ML
INJECTION, SOLUTION INTRAVENOUS CONTINUOUS PRN
Status: DISCONTINUED | OUTPATIENT
Start: 2024-04-09 | End: 2024-04-09

## 2024-04-09 RX ORDER — HYDROCODONE BITARTRATE AND ACETAMINOPHEN 5; 325 MG/1; MG/1
1 TABLET ORAL EVERY 8 HOURS PRN
Qty: 15 TABLET | Refills: 0 | Status: SHIPPED | OUTPATIENT
Start: 2024-04-09

## 2024-04-09 RX ORDER — BUPIVACAINE HYDROCHLORIDE AND EPINEPHRINE 5; 5 MG/ML; UG/ML
INJECTION, SOLUTION EPIDURAL; INTRACAUDAL; PERINEURAL
Status: DISCONTINUED | OUTPATIENT
Start: 2024-04-09 | End: 2024-04-09 | Stop reason: HOSPADM

## 2024-04-09 RX ORDER — FENTANYL CITRATE 50 UG/ML
INJECTION, SOLUTION INTRAMUSCULAR; INTRAVENOUS
Status: DISCONTINUED | OUTPATIENT
Start: 2024-04-09 | End: 2024-04-09

## 2024-04-09 RX ORDER — ONDANSETRON HYDROCHLORIDE 2 MG/ML
4 INJECTION, SOLUTION INTRAVENOUS DAILY PRN
Status: DISCONTINUED | OUTPATIENT
Start: 2024-04-09 | End: 2024-04-09 | Stop reason: HOSPADM

## 2024-04-09 RX ORDER — DIPHENHYDRAMINE HYDROCHLORIDE 50 MG/ML
12.5 INJECTION INTRAMUSCULAR; INTRAVENOUS
Status: DISCONTINUED | OUTPATIENT
Start: 2024-04-09 | End: 2024-04-09 | Stop reason: HOSPADM

## 2024-04-09 RX ORDER — ACETAMINOPHEN 10 MG/ML
INJECTION, SOLUTION INTRAVENOUS
Status: DISCONTINUED | OUTPATIENT
Start: 2024-04-09 | End: 2024-04-09

## 2024-04-09 RX ORDER — HYDROCODONE BITARTRATE AND ACETAMINOPHEN 5; 325 MG/1; MG/1
1 TABLET ORAL EVERY 6 HOURS PRN
Status: DISCONTINUED | OUTPATIENT
Start: 2024-04-09 | End: 2024-04-09 | Stop reason: HOSPADM

## 2024-04-09 RX ORDER — LIDOCAINE HYDROCHLORIDE 20 MG/ML
INJECTION, SOLUTION EPIDURAL; INFILTRATION; INTRACAUDAL; PERINEURAL
Status: DISCONTINUED | OUTPATIENT
Start: 2024-04-09 | End: 2024-04-09

## 2024-04-09 RX ORDER — ONDANSETRON HYDROCHLORIDE 2 MG/ML
4 INJECTION, SOLUTION INTRAVENOUS EVERY 12 HOURS PRN
Status: DISCONTINUED | OUTPATIENT
Start: 2024-04-09 | End: 2024-04-09 | Stop reason: HOSPADM

## 2024-04-09 RX ORDER — OXYCODONE HYDROCHLORIDE 5 MG/1
5 TABLET ORAL
Status: DISCONTINUED | OUTPATIENT
Start: 2024-04-09 | End: 2024-04-09 | Stop reason: HOSPADM

## 2024-04-09 RX ADMIN — FENTANYL CITRATE 50 MCG: 50 INJECTION, SOLUTION INTRAMUSCULAR; INTRAVENOUS at 07:04

## 2024-04-09 RX ADMIN — FENTANYL CITRATE 50 MCG: 50 INJECTION, SOLUTION INTRAMUSCULAR; INTRAVENOUS at 08:04

## 2024-04-09 RX ADMIN — FENTANYL CITRATE 25 MCG: 50 INJECTION, SOLUTION INTRAMUSCULAR; INTRAVENOUS at 09:04

## 2024-04-09 RX ADMIN — LIDOCAINE HYDROCHLORIDE 60 MG: 20 INJECTION, SOLUTION INTRAVENOUS at 07:04

## 2024-04-09 RX ADMIN — ACETAMINOPHEN 1000 MG: 10 INJECTION, SOLUTION INTRAVENOUS at 07:04

## 2024-04-09 RX ADMIN — PROPOFOL 200 MG: 10 INJECTION, EMULSION INTRAVENOUS at 07:04

## 2024-04-09 RX ADMIN — MIDAZOLAM HYDROCHLORIDE 2 MG: 1 INJECTION, SOLUTION INTRAMUSCULAR; INTRAVENOUS at 07:04

## 2024-04-09 RX ADMIN — CEFAZOLIN SODIUM 2 G: 2 SOLUTION INTRAVENOUS at 07:04

## 2024-04-09 RX ADMIN — SODIUM CHLORIDE, SODIUM LACTATE, POTASSIUM CHLORIDE, AND CALCIUM CHLORIDE: .6; .31; .03; .02 INJECTION, SOLUTION INTRAVENOUS at 07:04

## 2024-04-09 RX ADMIN — ONDANSETRON 4 MG: 2 INJECTION INTRAMUSCULAR; INTRAVENOUS at 07:04

## 2024-04-09 RX ADMIN — OXYCODONE HYDROCHLORIDE 5 MG: 5 TABLET ORAL at 09:04

## 2024-04-09 RX ADMIN — FAMOTIDINE 20 MG: 10 INJECTION, SOLUTION INTRAVENOUS at 07:04

## 2024-04-09 NOTE — TELEPHONE ENCOUNTER
Spoke with patient, states that he had a few other appointments scheduled the same time as Dr Calero's appt on the 15th of April. Patient is able to make appt on 22nd. Will call back with official time.

## 2024-04-09 NOTE — TRANSFER OF CARE
Anesthesia Transfer of Care Note    Patient: Yong Arenas    Procedure(s) Performed: Procedure(s) (LRB):  INSERTION, PORT-A-CATH (Left)    Patient location: PACU    Anesthesia Type: general    Transport from OR: Transported from OR on room air with adequate spontaneous ventilation    Post pain: adequate analgesia    Post assessment: no apparent anesthetic complications    Post vital signs: stable    Level of consciousness: awake and alert    Nausea/Vomiting: no nausea/vomiting    Complications: none    Transfer of care protocol was followed      Last vitals: Visit Vitals  /89   Pulse 66   Temp 36.5 °C (97.7 °F) (Oral)   Resp 16   SpO2 97%

## 2024-04-09 NOTE — PLAN OF CARE
Xray reading by Dr. Walker shows the lungs are normally expanded, with no consolidation, pleural effusion, or evidence of pulmonary edema. No confluent infiltrates or pneumothorax.

## 2024-04-09 NOTE — OP NOTE
Preop diagnosis carcinoma of the rectum  Postop diagnosis same  Procedures port placement for chemotherapy infusion  Patient was placed supine on operating table where satisfactory general anesthesia.  Both arms were tucked.  Oblique shoulder roll was placed.  The patient was prepped from the neck to the mid chest level.  And draped in a sterile fashion.  I made a chou once the catheter sverse parasternal incision on the right and developed a pocket for the port on top of the muscle.  Stab incision was made below the lateral 3rd of the right clavicle.  I had difficulty and could not isolate the subclavian vein following multiple punctures.  Then made a stab incision below the lateral 3rd of the left clavicle was able to aspirate from the left subclavian vein without difficulty.  Guidewire was threaded into superior vena cava.  This was all confirmed with the C-arm.  The port was sewn in with interrupted permanent sutures and placed a the tunneled the catheter across his chest to the left subclavian position.  Catheter was cut to the appropriate length.  Dilator was placed over the guidewire to dilate the space.  I then used the dilator and outer sheath over the guidewire.  The guidewire and dilator were removed and the catheter was fed down through the sheath into the superior vena cava.  Sheath was removed.  Port was aspirated and found to be functioning well.  Incisions were all closed with absorbable suture.  All layers were infiltrated with Marcaine with epinephrine.  Patient tolerated procedure well.

## 2024-04-09 NOTE — ANESTHESIA POSTPROCEDURE EVALUATION
Anesthesia Post Evaluation    Patient: Yong Arenas    Procedure(s) Performed: Procedure(s) (LRB):  INSERTION, PORT-A-CATH (Left)    Final Anesthesia Type: general      Patient location during evaluation: PACU  Patient participation: Yes- Able to Participate  Level of consciousness: awake and alert and oriented  Post-procedure vital signs: reviewed and stable  Pain management: adequate  Airway patency: patent    PONV status at discharge: No PONV  Anesthetic complications: no      Cardiovascular status: blood pressure returned to baseline and hemodynamically stable  Respiratory status: unassisted, spontaneous ventilation and room air  Hydration status: euvolemic  Follow-up not needed.          POST OP CXR    FINDINGS:  Portable chest radiograph at 09:05 hours with no prior studies for comparison shows injection port type central venous catheter in the upper chest, with the port reservoir overlying the right anterior 2nd rib, and the catheter extending from right to left across the midline, coursing over the cranial aspect of the left 1st rib, and presumably into the left subclavian vein.  A short segment of the catheter just medial to the left 1st rib demonstrates marked luminal narrowing.  The remaining portion of the catheter distally appears normal, with the distal tip overlying the SVC.     The cardiomediastinal silhouette and pulmonary vasculature are within normal limits. The lungs are normally expanded, with no consolidation, pleural effusion, or evidence of pulmonary edema. No confluent infiltrates or pneumothorax. There are no significant osseous abnormalities.     Impression:     Abnormal appearance of short segment of the left subclavian injection port type central venous catheter, as described.       Vitals Value Taken Time   /97 04/09/24 0930   Temp 36.3 °C (97.4 °F) 04/09/24 0859   Pulse 61 04/09/24 0941   Resp 10 04/09/24 0941   SpO2 98 % 04/09/24 0941   Vitals shown include unvalidated  device data.      No case tracking events are documented in the log.      Pain/Bel Score: Pain Rating Prior to Med Admin: 4 (4/9/2024  9:40 AM)  Bel Score: 10 (4/9/2024  9:15 AM)

## 2024-04-09 NOTE — DISCHARGE INSTRUCTIONS
No driving for 24 hours and while taking pain meds.  Leave dressing in place for 72 hours.  Ok to shower in 72 hours. Do not submerge incision in water.  Notify MD for fever > 100.4, signs of infection, severe pain or persistent nausea/vomiting.

## 2024-04-09 NOTE — TELEPHONE ENCOUNTER
----- Message from Karen Aaron RN sent at 4/9/2024 11:45 AM CDT -----  Good morning -     Patient has been scheduled for CT Guided Biopsy of the Liver 4/15/24 at 0900 as requested Dr Bran Samayoa. He has appointments with your teams that morning and needs to move them. Can you please reach out to the wife to reschedule if possible?    Thank you    CARLOS Jones

## 2024-04-09 NOTE — ANESTHESIA PREPROCEDURE EVALUATION
04/09/2024  Yong Arenas is a 44 y.o., male.      Pre-op Assessment    I have reviewed the Patient Summary Reports.     I have reviewed the Nursing Notes. I have reviewed the NPO Status.   I have reviewed the Medications.     Review of Systems  Anesthesia Hx:             Denies Family Hx of Anesthesia complications.    Denies Personal Hx of Anesthesia complications.                    Social:  Former Smoker, No Alcohol Use       Hematology/Oncology:  Hematology Normal                     Current/Recent Cancer. (rectal cancer with probable metastases to liver)                EENT/Dental:  EENT/Dental Normal           Cardiovascular:     Hypertension           hyperlipidemia                             Pulmonary:  Pulmonary Normal        Sleep apnea suspected but not tested               Renal/:  Renal/ Normal                 Hepatic/GI:     GERD (only occasionally), well controlled   Rectal cancer with probable liver metastases          Musculoskeletal:     Occasional neck and back pain, no opioids            Neurological:  Neurology Normal                                      Endocrine:  Endocrine Normal            Psych:  Psychiatric Normal                    Physical Exam  General: Well nourished, Cooperative, Alert and Oriented    Airway:  Mallampati: I   Mouth Opening: Normal  TM Distance: > 6 cm  Tongue: Normal  Neck ROM: Normal ROM    Dental:  Intact    Chest/Lungs:  Clear to auscultation, Normal Respiratory Rate    Heart:  Rate: Normal  Rhythm: Regular Rhythm  Sounds: Normal        Anesthesia Plan  Type of Anesthesia, risks & benefits discussed:    Anesthesia Type: Gen Supraglottic Airway  Intra-op Monitoring Plan: Standard ASA Monitors  Post Op Pain Control Plan: multimodal analgesia  Induction:  IV  Airway Plan: , Post-Induction  Informed Consent: Informed consent signed with the Patient and  all parties understand the risks and agree with anesthesia plan.  All questions answered.   ASA Score: 3  Anesthesia Plan Notes: LMA  Multimodal analgesia:  IV acetaminophen   Antiemetics:  Zofran, Pepcid    Ready For Surgery From Anesthesia Perspective.     .

## 2024-04-10 ENCOUNTER — TELEPHONE (OUTPATIENT)
Dept: HEMATOLOGY/ONCOLOGY | Facility: CLINIC | Age: 44
End: 2024-04-10

## 2024-04-10 NOTE — TELEPHONE ENCOUNTER
Called [facility] @ [phone] and spoke with Porsha Quiles. Explained that the BM biopsy is medically necessary because from recent PET scan it shows that pts rectal cancer poss mets to his liver. Ms Evelynjosh said that they would flag this as urgent and procedure can continue on 4/15/24.

## 2024-04-10 NOTE — TELEPHONE ENCOUNTER
Spoke with patient's spouse and reminded them to make sure to do labs before do the liver biopsy on Monday 4/15/24. Spouse verbalized understanding.  Wife concerned because she said that even though the prot was placed on the right side they had to tunnel it the left side and patient is having severe pain. Said that they called Dr Castellanos's office but there was no answer.  Asked if pt running a temp, SOB, chest pain, numbness or tingling to either side, loss of strength, excessive swelling, etc. Spouse said no. Advised that if any of those symptoms were to happen to go to the ER. Advised to apply ice to surgical site for at least 20 min to see if that might help with the discomfort. Also if they have 5mg hydrocodone they might need to increase it to 10 mg to help with discomfort. Advised that pt needs to get in touch with Dr Castellanos's office tomorrow to inform them of pts symptoms. Advised that I would follow up to see how pt is doing. Spouse verbalized understanding.

## 2024-04-11 ENCOUNTER — TELEPHONE (OUTPATIENT)
Dept: RADIOLOGY | Facility: HOSPITAL | Age: 44
End: 2024-04-11

## 2024-04-12 ENCOUNTER — TELEPHONE (OUTPATIENT)
Dept: HEMATOLOGY/ONCOLOGY | Facility: CLINIC | Age: 44
End: 2024-04-12

## 2024-04-12 ENCOUNTER — DOCUMENTATION ONLY (OUTPATIENT)
Dept: HEMATOLOGY/ONCOLOGY | Facility: CLINIC | Age: 44
End: 2024-04-12
Payer: COMMERCIAL

## 2024-04-12 ENCOUNTER — HOSPITAL ENCOUNTER (OUTPATIENT)
Dept: RADIOLOGY | Facility: HOSPITAL | Age: 44
Discharge: HOME | End: 2024-04-12
Attending: SURGERY
Payer: COMMERCIAL

## 2024-04-12 DIAGNOSIS — C20 RECTAL CANCER METASTASIZED TO LIVER: ICD-10-CM

## 2024-04-12 DIAGNOSIS — C78.7 RECTAL CANCER METASTASIZED TO LIVER: ICD-10-CM

## 2024-04-12 LAB
OHS QRS DURATION: 90 MS
OHS QTC CALCULATION: 410 MS

## 2024-04-12 PROCEDURE — 25500020 PHARM REV CODE 255: Performed by: SURGERY

## 2024-04-12 PROCEDURE — 74183 MRI ABD W/O CNTR FLWD CNTR: CPT | Mod: TC

## 2024-04-12 PROCEDURE — A9585 GADOBUTROL INJECTION: HCPCS | Performed by: SURGERY

## 2024-04-12 PROCEDURE — 74183 MRI ABD W/O CNTR FLWD CNTR: CPT | Mod: 26,,, | Performed by: RADIOLOGY

## 2024-04-12 RX ORDER — GADOBUTROL 604.72 MG/ML
9 INJECTION INTRAVENOUS
Status: COMPLETED | OUTPATIENT
Start: 2024-04-12 | End: 2024-04-12

## 2024-04-12 RX ADMIN — GADOBUTROL 9 ML: 604.72 INJECTION INTRAVENOUS at 11:04

## 2024-04-15 ENCOUNTER — HOSPITAL ENCOUNTER (OUTPATIENT)
Dept: RADIOLOGY | Facility: HOSPITAL | Age: 44
Discharge: HOME | End: 2024-04-15
Attending: INTERNAL MEDICINE
Payer: COMMERCIAL

## 2024-04-15 VITALS
HEART RATE: 75 BPM | TEMPERATURE: 98 F | SYSTOLIC BLOOD PRESSURE: 124 MMHG | RESPIRATION RATE: 16 BRPM | DIASTOLIC BLOOD PRESSURE: 85 MMHG | OXYGEN SATURATION: 97 %

## 2024-04-15 DIAGNOSIS — R93.2 ABNORMAL PET SCAN, LIVER: ICD-10-CM

## 2024-04-15 DIAGNOSIS — C20 ADENOCARCINOMA OF RECTUM: ICD-10-CM

## 2024-04-15 LAB
APTT PPP: 28 SEC (ref 21–32)
BASOPHILS # BLD AUTO: 0.05 K/UL (ref 0–0.2)
BASOPHILS NFR BLD: 1.1 % (ref 0–1.9)
DIFFERENTIAL METHOD BLD: ABNORMAL
EOSINOPHIL # BLD AUTO: 0.6 K/UL (ref 0–0.5)
EOSINOPHIL NFR BLD: 13.9 % (ref 0–8)
ERYTHROCYTE [DISTWIDTH] IN BLOOD BY AUTOMATED COUNT: 12.9 % (ref 11.5–14.5)
HCT VFR BLD AUTO: 42.2 % (ref 40–54)
HGB BLD-MCNC: 14.1 G/DL (ref 14–18)
IMM GRANULOCYTES # BLD AUTO: 0.01 K/UL (ref 0–0.04)
IMM GRANULOCYTES NFR BLD AUTO: 0.2 % (ref 0–0.5)
INR PPP: 0.9 (ref 0.8–1.2)
LYMPHOCYTES # BLD AUTO: 1.5 K/UL (ref 1–4.8)
LYMPHOCYTES NFR BLD: 32 % (ref 18–48)
MCH RBC QN AUTO: 28.9 PG (ref 27–31)
MCHC RBC AUTO-ENTMCNC: 33.4 G/DL (ref 32–36)
MCV RBC AUTO: 87 FL (ref 82–98)
MONOCYTES # BLD AUTO: 0.3 K/UL (ref 0.3–1)
MONOCYTES NFR BLD: 6.5 % (ref 4–15)
NEUTROPHILS # BLD AUTO: 2.1 K/UL (ref 1.8–7.7)
NEUTROPHILS NFR BLD: 46.3 % (ref 38–73)
NRBC BLD-RTO: 0 /100 WBC
PLATELET # BLD AUTO: 204 K/UL (ref 150–450)
PMV BLD AUTO: 9.7 FL (ref 9.2–12.9)
PROTHROMBIN TIME: 10.1 SEC (ref 9–12.5)
RBC # BLD AUTO: 4.88 M/UL (ref 4.6–6.2)
WBC # BLD AUTO: 4.6 K/UL (ref 3.9–12.7)

## 2024-04-15 PROCEDURE — 25000003 PHARM REV CODE 250: Performed by: RADIOLOGY

## 2024-04-15 PROCEDURE — 74150 CT ABDOMEN W/O CONTRAST: CPT | Mod: 26,,, | Performed by: RADIOLOGY

## 2024-04-15 PROCEDURE — 85025 COMPLETE CBC W/AUTO DIFF WBC: CPT | Performed by: RADIOLOGY

## 2024-04-15 PROCEDURE — 74150 CT ABDOMEN W/O CONTRAST: CPT | Mod: TC

## 2024-04-15 PROCEDURE — 85610 PROTHROMBIN TIME: CPT | Performed by: RADIOLOGY

## 2024-04-15 PROCEDURE — 85730 THROMBOPLASTIN TIME PARTIAL: CPT | Performed by: RADIOLOGY

## 2024-04-15 RX ORDER — SODIUM CHLORIDE 9 MG/ML
INJECTION, SOLUTION INTRAVENOUS
Status: COMPLETED | OUTPATIENT
Start: 2024-04-15 | End: 2024-04-15

## 2024-04-15 RX ADMIN — SODIUM CHLORIDE 250 ML/HR: 900 INJECTION, SOLUTION INTRAVENOUS at 09:04

## 2024-04-15 NOTE — PLAN OF CARE
0940 Pt returned from radiology, procedure not done per radiology nurse, Ariella Figueroa, RN, who states radiologist unable to access area to be biopsied.  0950 Pt left amb in care of spouse in nad.

## 2024-04-15 NOTE — PLAN OF CARE
Procedure cancelled due to radiologist inability to visualize properly on scan, no sedation was given, pt aware, back to 1552 via stretcher aao3, NAD noted.

## 2024-04-17 NOTE — PROGRESS NOTES
[facility] In Office Hematology Oncology Subsequent Encounter Note    4/18/24    Subjective:      Patient ID:   Yong Arenas  44 y.o. male  1980  Adan Hatch [place], [place], [place], MD's  [facility]      Chief Complaint:   Rectal cancer    HPI:  44 y.o. male has had rectal bleeding over the last 2-3 years.  BMs have become smaller.  He presented with symptoms of a prolapsed mass.  He had colonoscopy and was found to have a large mass at the rectum.  Biopsy returned moderately differentiated invasive adenocarcinoma arising from a tubulovillous adenoma.  MRI of the rectum showed this 3.8 cm polypoid lesion, probably the primary focus of malignancy.  There was thickening of the posterior and left lateral rectal wall.  Pelvic lymph node was up to 5 mm in size, pelvic lymphadenopathy was not seen.  The MRI scan was negative for significant local invasion.  PET scan showed low rectal carcinoma highly suspicious for metastatic disease to the liver tiny left pararectal lymph node is suspicious based on morphology but too small to characterize with PET.  A lesion is seen subcapsular in the dome of the liver with an SUV of 3.9.  Another lesion is seen in the left lobe of the liver at 1.5 cm and has an SUV of 4.3.    Will see if we can schedule an MRI of the liver for Wednesday, and see if the radiologist can biopsy 1 of these lesions for tissue diagnosis to confirm the presence of liver metastases.  At this point clinically it would appear to be stage IV disease.  Previously discussed with Dr. Beck.  For stage IV disease, would plan to go forward with FOLFIRI or FOLFOX with Avastin or Vectibix initially for 4-6 cycles, then evaluate for response, consider hepatic metastasectomy potentially.  MRI of liver may give us a more accurate number of liver metastases?    Radiation to the rectal mass and infusional 5 FU is deferred at this time until we get the liver MRI and biopsy if feasible and  clarify if we have stage IV disease here?    The Pet scan showed 2 metastasesin the liver.  The MRI supported 5 metastses in the liver, largest 9 mm.  CAT was done to try liver bx for tissue dx.  Radiologist was not able to position him for Bx of liver mass.  CEA 36 increased.  Next Generational Sequencing Studies, Caris, support FOLFOX AVASTIN is good regimen for Stage 4 dx.  Discussed Folfox Avastin,q 2 weeks.  Start 4/23/24.  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  Plan to hold Avastin for 1 month before any surgery, because of impaired wound healing with avastin.    Chemo school  Port was placed 4/9/24, site is NT and healed at R chest, catheter extending to L chest veins.  PF today.  RTC 4/23/24.    He sees the liver and rectal surgeon at [facility] 4/22/24.    Appetite is intact, weight is stable, he complains of easy fatigue.    Medications include Cozaar for hypertension, Prilosec for GERD, he did have his esophagus dilated approximately 4 years ago.  He does take testosterone injections q.2 weeks over the last 1-2 years per his primary physician Dr. Hatch.  He had 2 of 4 wisdom teeth removed in the past and a root canal x2.  He has not had any surgeries.      He smokes less than a half pack per day for 25 years, none x1 year.  He drinks beer, vodka, bourbon.  He denies allergies to medications.  He is a  and ulnar at pulse [employer].  He does drink alkaline water.    He is traveling to [place] July 20th through July 24th.    His father had history of polyps, prostate cancer treated with radiation seeds.  His mother had cancer on her leg with swelling and was told that she had a liposarcoma.  He has a niece with history of leukemia who had stem cell transplant at age 22, she is 24 now.  He has 3 sisters alive and well and 2 sons alive and well age 23 and 21.    ROS:   GEN: normal without any fever, night sweats or weight  loss  HEENT: normal with no HA's, sore throat, stiff neck, changes in vision  CV: normal with no CP, SOB, PND, PERRY or orthopnea  PULM: normal with no SOB, cough, hemoptysis, sputum or pleuritic pain  GI: See HPI  : normal with no hematuria, dysuria  BREAST: normal with no mass, discharge, pain  SKIN: normal with no rash, erythema, bruising, or swelling      Social History     Socioeconomic History    Marital status:    Tobacco Use    Smoking status: Former     Types: Cigarettes     Start date:      Quit date:      Years since quittin.3    Smokeless tobacco: Never   Substance and Sexual Activity    Alcohol use: Not Currently     Comment: social    Drug use: Never         Current Outpatient Medications:     HYDROcodone-acetaminophen (NORCO) 5-325 mg per tablet, Take 1 tablet by mouth every 8 (eight) hours as needed for Pain., Disp: 15 tablet, Rfl: 0    ibuprofen (ADVIL,MOTRIN) 200 MG tablet, Take 200 mg by mouth every 6 (six) hours as needed for Pain., Disp: , Rfl:     loratadine-pseudoephedrine 5-120 mg (CLARITIN-D 12 HOUR) 5-120 mg per tablet, Take by mouth once daily., Disp: , Rfl:     losartan (COZAAR) 100 MG tablet, Take 1 tablet by mouth once daily., Disp: , Rfl:     omeprazole (PRILOSEC) 40 MG capsule, Take 1 capsule by mouth daily as needed., Disp: , Rfl:     testosterone cypionate (DEPOTESTOTERONE CYPIONATE) 200 mg/mL injection, Inject 1 mL into the muscle every 14 (fourteen) days., Disp: , Rfl:   No current facility-administered medications for this visit.    Facility-Administered Medications Ordered in Other Visits:     heparin, porcine (PF) 100 unit/mL injection flush 500 Units, 500 Units, Intravenous, PRN, ELMO Samayoa MD, 500 Units at 24 1103    sodium chloride 0.9% flush 10 mL, 10 mL, Intravenous, PRN, ELMO Samayoa MD, 10 mL at 24 1102          Objective:   Vitals:  Blood pressure 130/84, pulse 86, temperature 98.2 °F (36.8 °C), resp. rate 16, weight 103.2 kg  (227 lb 8 oz).    Physical Examination:   GEN: no apparent distress, comfortable  HEAD: atraumatic and normocephalic  EYES: no pallor, no icteru  ENT: no pharyngeal erythema, external ears WNL; no nasal discharge  NECK: no masses, thyroid normal, trachea midline, no LAD/LN's, supple  CV: RRR with no murmur; normal pulse; normal S1 and S2; no pedal edema  CHEST: Normal respiratory effort; CTAB; normal breath sounds; no wheeze or crackles  ABDOM: nontender and nondistended; soft; no rebound/guarding, L/S NP   MUSC/Skeletal: ROM normal; no crepitus; joints normal   EXTREM: no clubbing, cyanosis, inflammation or swelling  SKIN: no rashes, lesions, ulcers, petechiae   : no cvat  NEURO: grossly intact; motor/sensory WNL;  no tremors  PSYCH: normal mood, affect and behavior  LYMPH: normal cervical, supraclavicular, axillary and groin LN's  BREASTS: L & R no palpable mass    Colonoscopy report from March 18, 2024 revealed a large frond like villous fungating mass at the rectum, 5 cm in length, incomplete resection was done, the lesion was just above the anus.  A small polyp was removed from the cecum and 3 small polyps were removed from the sigmoid colon follow-up colonoscopy is due in 1 year.      Pathology report from [facility] dated March 19, 2024   [id number].  Small polyps were tubular adenoma and hyperplastic polyps.  The rectal lesion was moderately differentiated invasive adenocarcinoma arising from tubulovillous adenoma with extensive high-grade dysplasia.    PET scan and MRI scan of the pelvis have been ordered at [facility].  Assessment:   (1) 44 y.o. 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    (2) staging studies will include PET scan to check for metastatic disease, and to evaluate for lymphadenopathy in the pelvis.  MRI scan of the pelvis is being done to evaluate the local extent of the primary  tumor in the pelvis.  CBC, CMP, CEA, ferritin, B12, and folate have been ordered.    (3) staging of the rectal cancer is in progress with the PET scan and MRI scan and will be determined clinically after the results of the studies are confirmed.    (4) treatment options can include low anterior resection and abdominal peritoneal resection, radiation will also be involved for local control, and chemotherapy will be given as a radiation sensitizer and also to try and decrease risk of systemic recurrence adjuvantly or katy adjuvantly.  Next generation sequencing studies to determine a role for immune oncology will be submitted.    (5) in the past surgery was usually done initially, followed by adjuvant radiation therapy and chemotherapy concurrently for local control, followed by systemic chemotherapy x6 months adjuvantly to reduce systemic recurrence.    In the last several years, our  treatment approach has changed, with Neoadjuvant radiation therapy and concurrent chemotherapy infusion being given initially, followed by Neoadjuvant  systemic chemotherapy q.2 weeks times 6-12 cycles, followed by surgical re-evaluation at a later date to include low anterior resection, abdominal peritoneal resection, or observation.    He has an appointment to see Dr. Brandon Wolff, the surgical oncologist, at [facility].  Dr. Arellano of radiation therapy.    Folfox Avastin D1C1 4/23/24, see discussion in HPI above.

## 2024-04-18 ENCOUNTER — OFFICE VISIT (OUTPATIENT)
Dept: HEMATOLOGY/ONCOLOGY | Facility: CLINIC | Age: 44
End: 2024-04-18
Payer: COMMERCIAL

## 2024-04-18 ENCOUNTER — TELEPHONE (OUTPATIENT)
Dept: HEMATOLOGY/ONCOLOGY | Facility: CLINIC | Age: 44
End: 2024-04-18

## 2024-04-18 ENCOUNTER — INFUSION (OUTPATIENT)
Dept: INFUSION THERAPY | Facility: HOSPITAL | Age: 44
End: 2024-04-18
Attending: INTERNAL MEDICINE
Payer: COMMERCIAL

## 2024-04-18 VITALS
SYSTOLIC BLOOD PRESSURE: 130 MMHG | TEMPERATURE: 98 F | HEART RATE: 86 BPM | BODY MASS INDEX: 28.44 KG/M2 | RESPIRATION RATE: 16 BRPM | DIASTOLIC BLOOD PRESSURE: 84 MMHG | WEIGHT: 227.5 LBS

## 2024-04-18 VITALS
OXYGEN SATURATION: 97 % | BODY MASS INDEX: 28.26 KG/M2 | HEIGHT: 75 IN | SYSTOLIC BLOOD PRESSURE: 142 MMHG | WEIGHT: 227.31 LBS | DIASTOLIC BLOOD PRESSURE: 94 MMHG | HEART RATE: 69 BPM | RESPIRATION RATE: 17 BRPM

## 2024-04-18 DIAGNOSIS — C20 RECTAL CANCER METASTASIZED TO LIVER: Primary | ICD-10-CM

## 2024-04-18 DIAGNOSIS — C78.7 RECTAL CANCER METASTASIZED TO LIVER: Primary | ICD-10-CM

## 2024-04-18 DIAGNOSIS — C20 ADENOCARCINOMA OF RECTUM: Primary | ICD-10-CM

## 2024-04-18 PROCEDURE — 96523 IRRIG DRUG DELIVERY DEVICE: CPT

## 2024-04-18 PROCEDURE — 99214 OFFICE O/P EST MOD 30 MIN: CPT | Mod: S$GLB,,, | Performed by: INTERNAL MEDICINE

## 2024-04-18 PROCEDURE — 3079F DIAST BP 80-89 MM HG: CPT | Mod: CPTII,S$GLB,, | Performed by: INTERNAL MEDICINE

## 2024-04-18 PROCEDURE — 25000003 PHARM REV CODE 250: Performed by: INTERNAL MEDICINE

## 2024-04-18 PROCEDURE — A4216 STERILE WATER/SALINE, 10 ML: HCPCS | Performed by: INTERNAL MEDICINE

## 2024-04-18 PROCEDURE — 3008F BODY MASS INDEX DOCD: CPT | Mod: CPTII,S$GLB,, | Performed by: INTERNAL MEDICINE

## 2024-04-18 PROCEDURE — 1159F MED LIST DOCD IN RCRD: CPT | Mod: CPTII,S$GLB,, | Performed by: INTERNAL MEDICINE

## 2024-04-18 PROCEDURE — 3075F SYST BP GE 130 - 139MM HG: CPT | Mod: CPTII,S$GLB,, | Performed by: INTERNAL MEDICINE

## 2024-04-18 PROCEDURE — 63600175 PHARM REV CODE 636 W HCPCS: Performed by: INTERNAL MEDICINE

## 2024-04-18 RX ORDER — DIPHENHYDRAMINE HYDROCHLORIDE 50 MG/ML
50 INJECTION INTRAMUSCULAR; INTRAVENOUS ONCE AS NEEDED
Status: CANCELLED | OUTPATIENT
Start: 2024-05-06

## 2024-04-18 RX ORDER — HEPARIN 100 UNIT/ML
500 SYRINGE INTRAVENOUS
Status: CANCELLED | OUTPATIENT
Start: 2024-04-18

## 2024-04-18 RX ORDER — HEPARIN 100 UNIT/ML
500 SYRINGE INTRAVENOUS
Status: DISCONTINUED | OUTPATIENT
Start: 2024-04-18 | End: 2024-04-18 | Stop reason: HOSPADM

## 2024-04-18 RX ORDER — SODIUM CHLORIDE 0.9 % (FLUSH) 0.9 %
10 SYRINGE (ML) INJECTION
Status: DISCONTINUED | OUTPATIENT
Start: 2024-04-18 | End: 2024-04-18 | Stop reason: HOSPADM

## 2024-04-18 RX ORDER — EPINEPHRINE 0.3 MG/.3ML
0.3 INJECTION SUBCUTANEOUS ONCE AS NEEDED
Status: CANCELLED | OUTPATIENT
Start: 2024-05-06

## 2024-04-18 RX ORDER — HEPARIN 100 UNIT/ML
500 SYRINGE INTRAVENOUS
Status: CANCELLED | OUTPATIENT
Start: 2024-05-06

## 2024-04-18 RX ORDER — SODIUM CHLORIDE 0.9 % (FLUSH) 0.9 %
10 SYRINGE (ML) INJECTION
Status: CANCELLED | OUTPATIENT
Start: 2024-05-08

## 2024-04-18 RX ORDER — HEPARIN 100 UNIT/ML
500 SYRINGE INTRAVENOUS
OUTPATIENT
Start: 2024-04-18

## 2024-04-18 RX ORDER — SODIUM CHLORIDE 0.9 % (FLUSH) 0.9 %
10 SYRINGE (ML) INJECTION
Status: CANCELLED | OUTPATIENT
Start: 2024-05-06

## 2024-04-18 RX ORDER — HEPARIN 100 UNIT/ML
500 SYRINGE INTRAVENOUS
Status: CANCELLED | OUTPATIENT
Start: 2024-05-08

## 2024-04-18 RX ORDER — PROCHLORPERAZINE EDISYLATE 5 MG/ML
10 INJECTION INTRAMUSCULAR; INTRAVENOUS ONCE AS NEEDED
Status: CANCELLED | OUTPATIENT
Start: 2024-05-08

## 2024-04-18 RX ORDER — PROCHLORPERAZINE EDISYLATE 5 MG/ML
10 INJECTION INTRAMUSCULAR; INTRAVENOUS ONCE AS NEEDED
Status: CANCELLED | OUTPATIENT
Start: 2024-05-06

## 2024-04-18 RX ORDER — SODIUM CHLORIDE 0.9 % (FLUSH) 0.9 %
10 SYRINGE (ML) INJECTION
Status: CANCELLED | OUTPATIENT
Start: 2024-04-18

## 2024-04-18 RX ORDER — FLUOROURACIL 50 MG/ML
400 INJECTION, SOLUTION INTRAVENOUS
Status: CANCELLED | OUTPATIENT
Start: 2024-05-06

## 2024-04-18 RX ORDER — SODIUM CHLORIDE 0.9 % (FLUSH) 0.9 %
10 SYRINGE (ML) INJECTION
OUTPATIENT
Start: 2024-04-18

## 2024-04-18 RX ADMIN — SODIUM CHLORIDE, PRESERVATIVE FREE 10 ML: 5 INJECTION INTRAVENOUS at 11:04

## 2024-04-18 RX ADMIN — HEPARIN 500 UNITS: 100 SYRINGE at 11:04

## 2024-04-19 ENCOUNTER — TELEPHONE (OUTPATIENT)
Dept: HEMATOLOGY/ONCOLOGY | Facility: CLINIC | Age: 44
End: 2024-04-19

## 2024-04-19 ENCOUNTER — OFFICE VISIT (OUTPATIENT)
Dept: HEMATOLOGY/ONCOLOGY | Facility: CLINIC | Age: 44
End: 2024-04-19
Payer: COMMERCIAL

## 2024-04-19 VITALS
BODY MASS INDEX: 28.35 KG/M2 | RESPIRATION RATE: 18 BRPM | SYSTOLIC BLOOD PRESSURE: 136 MMHG | WEIGHT: 228 LBS | HEIGHT: 75 IN | DIASTOLIC BLOOD PRESSURE: 87 MMHG | HEART RATE: 83 BPM | TEMPERATURE: 98 F

## 2024-04-19 DIAGNOSIS — C20 ADENOCARCINOMA OF RECTUM: Primary | ICD-10-CM

## 2024-04-19 DIAGNOSIS — C78.7 RECTAL CANCER METASTASIZED TO LIVER: ICD-10-CM

## 2024-04-19 DIAGNOSIS — C20 ADENOCARCINOMA OF RECTUM: ICD-10-CM

## 2024-04-19 DIAGNOSIS — C20 RECTAL CANCER METASTASIZED TO LIVER: ICD-10-CM

## 2024-04-19 PROCEDURE — 3079F DIAST BP 80-89 MM HG: CPT | Mod: CPTII,S$GLB,, | Performed by: NURSE PRACTITIONER

## 2024-04-19 PROCEDURE — 3008F BODY MASS INDEX DOCD: CPT | Mod: CPTII,S$GLB,, | Performed by: NURSE PRACTITIONER

## 2024-04-19 PROCEDURE — 99215 OFFICE O/P EST HI 40 MIN: CPT | Mod: S$GLB,,, | Performed by: NURSE PRACTITIONER

## 2024-04-19 PROCEDURE — 3075F SYST BP GE 130 - 139MM HG: CPT | Mod: CPTII,S$GLB,, | Performed by: NURSE PRACTITIONER

## 2024-04-19 PROCEDURE — 1159F MED LIST DOCD IN RCRD: CPT | Mod: CPTII,S$GLB,, | Performed by: NURSE PRACTITIONER

## 2024-04-19 RX ORDER — PROMETHAZINE HYDROCHLORIDE 25 MG/1
25 TABLET ORAL
Qty: 30 TABLET | Refills: 2 | Status: SHIPPED | OUTPATIENT
Start: 2024-04-19

## 2024-04-19 RX ORDER — ONDANSETRON HYDROCHLORIDE 8 MG/1
8 TABLET, FILM COATED ORAL EVERY 8 HOURS PRN
Qty: 30 TABLET | Refills: 2 | Status: SHIPPED | OUTPATIENT
Start: 2024-04-19 | End: 2025-04-19

## 2024-04-19 NOTE — TELEPHONE ENCOUNTER
Labs along with port flush (to access labs from pts port) faxed to [place]. (Both infusion and lab)   Spoke with Balbina in infusion at [place] and confirmed that they can do pts labs from his port and on Fridays.

## 2024-04-19 NOTE — PROGRESS NOTES
[facility] HEMATOLGY ONCOLOGY     Subjective:       Patient ID: Yong Arenas is a 44 y.o. male presents today for chemotherapy education.      Chief Complaint:Rectal Cancer with mets to the liver     HPI  The patient is here today with his wife. He is starting on Folfox and Avastin for metastatic rectal cancer.     He has a right chest wall PAC.     Due to see liver and rectal surgeon on Monday at [facility].     Oncology History   Rectal cancer metastasized to liver   3/25/2024 Initial Diagnosis    Adenocarcinoma of rectum     4/22/2024 -  Chemotherapy    Treatment Summary   Plan Name: OP GI [id number] (oxaliplatin leucovorin fluorouracil) with bevacizumab Q2W  Treatment Goal: Curative  Status: Active  Start Date: 4/22/2024 (Planned)  End Date: 3/12/2025 (Planned)  Provider: ELMO Samayoa MD  Chemotherapy: fluorouraciL injection 935 mg, 400 mg/m2 = 935 mg, Intravenous, Clinic/HOD 1 time, 0 of 24 cycles  oxaliplatin (ELOXATIN) 85 mg/m2 = 199 mg in dextrose 5 % (D5W) 539.8 mL chemo infusion, 85 mg/m2 = 199 mg, Intravenous, Clinic/HOD 1 time, 0 of 24 cycles  fluorouracil (Adrucil) 2,400 mg/m2 = 5,615 mg in sodium chloride 0.9% 240 mL chemo infusion, 2,400 mg/m2 = 5,615 mg, Intravenous, Clinic/HOD 1 time, 0 of 24 cycles  bevacizumab-awwb (MVASI) 5 mg/kg = 515 mg in sodium chloride 0.9% 100 mL infusion, 5 mg/kg = 515 mg, Intravenous, Clinic/HOD 1 time, 0 of 24 cycles         Past Medical History:   Diagnosis Date    Back pain     Cancer     Cervical pain     GERD (gastroesophageal reflux disease)     HLD (hyperlipidemia)     Hypertension     Tinnitus, unspecified ear        Past Surgical History:   Procedure Laterality Date    EGD, WITH BALLOON DILATION OF LESS THAN 30 MILLIMETERS  2020    INSERTION OF TUNNELED CENTRAL VENOUS CATHETER (CVC) WITH SUBCUTANEOUS PORT Left 4/9/2024    Procedure: INSERTION, PORT-A-CATH;  Surgeon: Tash Castellanos MD;  Location: [facility] OR;  Service: General;  Laterality: Left;  port placed to  right chest , tunneled over to left chest, subclavian    WISDOM TOOTH EXTRACTION      x2       Social History     Socioeconomic History    Marital status:    Tobacco Use    Smoking status: Former     Types: Cigarettes     Start date:      Quit date:      Years since quittin.3    Smokeless tobacco: Never   Substance and Sexual Activity    Alcohol use: Not Currently     Comment: social    Drug use: Never       Family History   Problem Relation Name Age of Onset    Prostate cancer Father         Review of patient's allergies indicates:  No Known Allergies      Current Outpatient Medications:     HYDROcodone-acetaminophen (NORCO) 5-325 mg per tablet, Take 1 tablet by mouth every 8 (eight) hours as needed for Pain., Disp: 15 tablet, Rfl: 0    ibuprofen (ADVIL,MOTRIN) 200 MG tablet, Take 200 mg by mouth every 6 (six) hours as needed for Pain., Disp: , Rfl:     loratadine-pseudoephedrine 5-120 mg (CLARITIN-D 12 HOUR) 5-120 mg per tablet, Take by mouth once daily., Disp: , Rfl:     losartan (COZAAR) 100 MG tablet, Take 1 tablet by mouth once daily., Disp: , Rfl:     omeprazole (PRILOSEC) 40 MG capsule, Take 1 capsule by mouth daily as needed., Disp: , Rfl:     testosterone cypionate (DEPOTESTOTERONE CYPIONATE) 200 mg/mL injection, Inject 1 mL into the muscle every 14 (fourteen) days., Disp: , Rfl:     ondansetron (ZOFRAN) 8 MG tablet, Take 1 tablet (8 mg total) by mouth every 8 (eight) hours as needed for Nausea., Disp: 30 tablet, Rfl: 2    promethazine (PHENERGAN) 25 MG tablet, Take 1 tablet (25 mg total) by mouth every 4 to 6 hours as needed for Nausea., Disp: 30 tablet, Rfl: 2    All medications and past history have been reviewed.    Review of Systems   Constitutional:  Negative for activity change, appetite change, fatigue and fever.   HENT:  Negative for congestion, mouth sores, postnasal drip, rhinorrhea, sinus pressure, sore throat and trouble swallowing.    Eyes:  Negative for photophobia and  "visual disturbance.   Respiratory:  Negative for cough, chest tightness, shortness of breath and wheezing.    Cardiovascular:  Negative for chest pain and leg swelling.   Gastrointestinal:  Positive for blood in stool. Negative for abdominal distention, abdominal pain, constipation, diarrhea, nausea and vomiting.   Endocrine: Negative for cold intolerance.   Genitourinary:  Negative for decreased urine volume, dysuria and frequency.   Musculoskeletal:  Negative for arthralgias and myalgias.   Skin:  Negative for pallor and rash.   Allergic/Immunologic: Negative for immunocompromised state.   Neurological:  Negative for dizziness, syncope, weakness, light-headedness, numbness and headaches.   Hematological:  Negative for adenopathy. Does not bruise/bleed easily.   Psychiatric/Behavioral:  Negative for sleep disturbance. The patient is not nervous/anxious.        Objective:        Physcial Examination  VITAL SIGNS:    Body surface area is 2.34 meters squared.   Pain Assessment  Vitals:    04/19/24 1346   BP: 136/87   Pulse: 83   Resp: 18   Temp: 98.1 °F (36.7 °C)   Weight: 103.4 kg (228 lb)   Height: 6' 3" (1.905 m)   PainSc: 0-No pain        Wt Readings from Last 5 Encounters:   04/19/24 103.4 kg (228 lb)   04/18/24 103.1 kg (227 lb 4.8 oz)   04/18/24 103.2 kg (227 lb 8 oz)   04/05/24 103 kg (227 lb 1.6 oz)   04/03/24 104.3 kg (230 lb)       Physical Exam  Constitutional:       Appearance: Normal appearance.   HENT:      Head: Normocephalic and atraumatic.      Mouth/Throat:      Mouth: Mucous membranes are moist.   Cardiovascular:      Rate and Rhythm: Normal rate and regular rhythm.      Pulses: Normal pulses.      Heart sounds: Normal heart sounds.   Pulmonary:      Effort: Pulmonary effort is normal. No respiratory distress.      Breath sounds: Normal breath sounds. No wheezing.   Abdominal:      General: There is no distension.      Palpations: Abdomen is soft. There is no mass.      Tenderness: There is no " abdominal tenderness.   Musculoskeletal:         General: No swelling. Normal range of motion.      Right lower leg: No edema.      Left lower leg: No edema.   Skin:     General: Skin is warm and dry.      Capillary Refill: Capillary refill takes 2 to 3 seconds.      Findings: No bruising or rash.   Neurological:      Mental Status: He is alert and oriented to person, place, and time. Mental status is at baseline.      Motor: No weakness.   Psychiatric:         Mood and Affect: Mood normal.         Behavior: Behavior normal.              Laboratory and Radiology   Lab Results   Component Value Date    WBC 4.60 04/15/2024    RBC 4.88 04/15/2024    HGB 14.1 04/15/2024    HCT 42.2 04/15/2024    MCV 87 04/15/2024    MCH 28.9 04/15/2024    MCHC 33.4 04/15/2024    RDW 12.9 04/15/2024     04/15/2024    MPV 9.7 04/15/2024    GRAN 2.1 04/15/2024    GRAN 46.3 04/15/2024    LYMPH 1.5 04/15/2024    LYMPH 32.0 04/15/2024    MONO 0.3 04/15/2024    MONO 6.5 04/15/2024    EOS 0.6 (H) 04/15/2024    BASO 0.05 04/15/2024    EOSINOPHIL 13.9 (H) 04/15/2024    BASOPHIL 1.1 04/15/2024     BMP  Lab Results   Component Value Date     04/03/2024    K 3.7 04/03/2024     04/03/2024    CO2 28 04/03/2024    BUN 12 04/03/2024    CREATININE 0.9 04/03/2024    CALCIUM 9.1 04/03/2024    ANIONGAP 5 (L) 04/03/2024     Lab Results   Component Value Date    ALT 41 04/03/2024    AST 26 04/03/2024    ALKPHOS 60 04/03/2024    BILITOT 0.3 04/03/2024     Results for orders placed or performed during the hospital encounter of 04/15/24 (from the past 2160 hour(s))   CT Abdomen Without Contrast    Narrative    CMS MANDATED QUALITY DATA - CT RADIATION - 436    All CT scans at this facility utilize dose modulation, iterative reconstruction, and/or weight based dosing when appropriate to reduce radiation dose to as low as reasonably achievable.    CLINICAL HISTORY:  ([id number] y/o  (1980) M    Rectal cancer, staging; Malignant neoplasm  of rectum    TECHNIQUE:  (A#[id number], exam time 4/15/2024 9:50)    CT ABDOMEN WITHOUT CONTRAST [id number]    Axial CT images of the abdomen and pelvis were obtained from the dome of the diaphragm to the proximal thighs.    COMPARISON:  MRI from 04/12/2024    FINDINGS:  Lower Thorax:    The visualized lung bases are centrally clear no pleural or pericardial effusion is seen.    CT Abdomen:    Liver: The liver is normal in size.  There is a somewhat heterogeneous background attenuation.  Ill-defined rounded areas of low-attenuation are seen in the liver.  The largest visualized hypoattenuating lesion in the left lobe of the liver measures 9 mm in diameter on this study (this was FDG avid on the previous PET-CT, and better seen on the diagnostic MRI).  After several attempts at remeasuring/marking, an appropriate, safe and accessible window for a diagnostic tissue sample was not found, and the procedure was ended.    Gallbladder: The gallbladder is within normal limits.    Biliary Tree: No intra or extrahepatic ductal dilation.    Spleen: Normal.    Pancreas: The pancreas is normal.    Adrenal Glands: Normal    Kidneys: The kidneys are normal in imaging appearance without hydronephrosis or hydroureter.    Vasculature: Normal.    Lymph nodes: No abdominal lymphadenopathy is seen.    Intraperitoneal structures: There is no ascites.    Bowel: The visualized bowel is normal in course and caliber.    Abdominal wall: The abdominal wall and musculature are normal.    Musculoskeletal: Normal.      Impression    1.  No accessible window for biopsy of the small FDG avid lesion in the liver.    2.  Additional incidental findings as above.    RESULT NOTIFICATION: These observations were discussed by the dictating physician, in person with the patient at 9:27 on 4/15/2024.      Electronically signed by: Jose Angel Smith  Date:    04/15/2024  Time:    10:01     Results for orders placed or performed during the hospital encounter of  04/02/24 (from the past 2160 hour(s))   NM PET CT FDG Skull Base to Mid Thigh    Impression    Low rectal carcinoma with findings highly suspicious for metastatic disease to the liver.    Tiny left perirectal lymph node is suspicious based on morphology but too small to characterize with PET.  This may be assessed further with MRI.    Right lung nodule favored to correspond to benign intra fissural lymph node, but difficult to confirm intra fissural location on current CT images.  Follow-up with conventional chest CT would be helpful.      Electronically signed by: Sharon Durant  Date:    04/02/2024  Time:    10:21     Results for orders placed or performed during the hospital encounter of 04/12/24 (from the past 2160 hour(s))   MRI Abdomen W WO Contrast    Impression    1. Several small rim enhancing lesions within the liver with associated diffusion restriction as above.  Findings are suspicious for multifocal hepatic metastatic disease.  Details as above.      Electronically signed by: Warren Jo  Date:    04/12/2024  Time:    13:31       Pathology  Pathology Results  (Last 10 years)      None            All lab results and imaging results have been reviewed and discussed with the patient.        TITLE: PLAN OF CARE FOR THE CHEMOTHERAPY PATIENT / TEACHING PROTOCOL    PURPOSE: To involve the patient / significant other in the plan of care and to provide teaching to the significant other & patient receiving chemotherapy.    LEVEL: Independent.    CONTENT: The Plan of Care for the chemotherapy patient is individualized and appropriate to the patients needs, strengths, limitations, & goals.  Education includes information regarding chemotherapy side effects, the treatment itself, and self-care  Activities.    GOAL / OUTCOME STANDARDS    PHYSIOLOGIC: The client will remain free or experience minimal side effects or toxicities throughout the chemotherapy treatment period.     PSYCHOLOGIC: The  client/significant others will demonstrate positive coping mechanisms in relation to chemotherapy and its side effects.      COGINITIVE: The client/significant others will verbalize understanding of self-care measure to avoid/minimize side effects of the chemotherapy regimen.    EVALUATION / COMMENT KEY:    V = Audiovisual/Video  S = Successfully meets outcome  N = Needs further instruction  NA = Not applicable to the patient  P = Previous knowledge  U = Unable to comprehend  * = See progress notes      PLAN OF CARE  INFORMATION TO BE DELIVERED / NURSING INTERVENTIONS DATE EVALUATION   Assessment of client/caregiver,         knowledge of cancer diagnosis,         and chemotherapy as a treatment. 1a. Evaluate patient/caregiver learning ability    b. Plan teaching sessions with patient/caregiver according to needs and present anxiety level/ability to learn.    c. Provide Chemotherapy Education Packet,        Mouth Care Protocol,         Specific Patient Education Sheets. 04/19/2024 S   Individual chemotherapy treatment         plan. 2a. Review of Chemotherapy Education handout from [employer]            04/19/2024   S   Knowledge Deficit & Self-Management of general side effects common to all chemotherapy:  Nausea/Vomiting  b.   Diarrhea  Mouth Care  Dental care  Constipation  Hair Loss  Potential for infection  Fatigue   3a. Reinforce that the majority of side effects from chemotherapy are reversible and are  controlled both in the hospital and at home        (blood counts recover, hair grows back).   b.  Refer to the following for reinforcement of         information post-treatment:  Mouth Care Protocol.  Bowel Protocol for constipation or diarrhea.  3.  Drug Specific Chemotherapy Information Sheets for each medication patient receiving.    04/19/2024     S     PLAN OF CARE  INFORMATION TO BE DELIVERED / NURSING INTERVENTIONS DATE EVALUATION   h. Potential for bleeding         i. Potential anemia/fatigue          j. Potential sunburn         k. Birth control measures  l. Safety measures post treatment 4.  Chemotherapy Home Care Instruction  and Safety Information Sheet.  A. patient/caregivers to thoroughly cook shellfish (shrimp, crab, etc) to decrease the chance of infection.    B.  Use sunscreen and protective clothing while in the sun.   04/19/2024      Knowledge deficit & Self Management of Drug Specific  Side Effects.    BLADDER EFFECTS        (Hemorrhagic Cystitis)                  Preventable with adequate hydration; occurs 2-3 days or more post treatment.   1.  Instruct patient to:  a.   Void at least every 2 hours; increase intake.  b.   DO NOT hold urine; go when urge is felt.  c.    Empty bladder at bedtime and on         awakening.  d.   Observe for color changes (red to tea           colored), amount and frequency changes.  e.   Notify oncologist of any abnormalities           in urine or voiding or if you cannot               drink adequate fluids.   04/19/2024   S   b.   CHANGES IN URINE   COLOR:      1.   Instruct patient:  a.   Most evident in first 2-3 voidings after           administration.  Lasts less than 24 hours.  If urine is discolored 2 or more days post- treatment, notify oncologist.      04/19/2024 S   c.    KIDNEY EFFECTS           (Nephrotoxicity)   1.  Instruct patient to:  a.   Drink 8-16 glasses of fluid/day the day   pre-treatment and 3-4 days post-treatment to maintain hydration; the best way to minimize kidney problems.  b.   Notify oncologist immediately if unable to drink fluids or if changes are noted in urinary elimination.     04/19/2024   S   PULMONARY TOXICITY    Instruct patient to report symptoms such as shortness of breath, chest pain, shallow breathing, or chest wall discomfort to physician.  Reinforce preventative measures used by the health care team.  Baseline and periodic PFT and chest x-ray.   04/19/2024   S     PLAN OF CARE INFORMATION TO BE DELIVERED / NURSING  INTERVENTIONS DATE EVALUATION   NERVE & MUSCLE EFFECTS (neurotoxocity; neuropathy, possible visual/hearing changes)        Instruct patient to:    Report numbness or tingling of the hands/feet, loss of fine motor movement (buttoning shirt, tying shoelaces), or gait changes to your oncologist.  If numbness/tingling are present:  protect feet with shoes at all times.  Use gloves for washing dishes/gardening & potholders in kitchen.       04/19/2024   S   CARDIOTOXICITY  Decreased effectiveness of             cardiac function. Effective are                  cumulative and irreversible.                                    CARDIAC ARRYTHMIAS              4   Instruct:  Heart function may be tested before treatment and perdiocally during treatment.  Notify oncologist of irregular pulse, palpitations, shortness of breath, or swelling in lower extremities/feet.        Chemotherapy can cause arrhythmias on infusion that resolve once infusion discontinued. Instruct nurse if any irregularity felt.    04/19/2024   S   EXTRAVASTION  Occurs when vesicants leak outside of vein and cause damage to the skin and underlying tissues.   Reinforce preventive measures used to avoid complications.  Fresh IV site or central line monitored continuously with vesicant IVP.  Continuous infusion via central line site and blood return monitored periodically around the clock.  Instruct to:  Notify nurse of any discomfort, burning, stinging, etc. at IV site during chemotherapy administration.  Notify oncologist of any redness, pain, or swelling at IV site after discharge from hospital.   04/19/2024   S   HYPERSENSITIVITY can happen with any medication.   Instruct patient:  Nurse is with them during the initial part of treatment and will be close by to monitor.  Pre-medication ordered by the oncologist must be taken on time. If doses are missed, treatment will need to be re-scheduled.  Skin redness, itching, or hives appearing after discharge should  be reported to oncologist. 04/19/2024   S       PLAN OF CARE INFORMATION TO BE DELIVERED / NURSING INTERVENTIONS DATE EVALUATION   FLU-LIKE SYNDROME      Instruct patient symptoms are hard to prevent and may include fever, shaking chills, muscle and body aches.  Taking prescribed medications from physician if needed.  Adequate fluids are important.    Reinforce the need to call if temperature is         elevated to 100.4 or more  04/19/2024   S   HAND-FOOT SYNDROME  causes painful, symmetric swelling and redness of palms and soles                  Instruct patient to report any numbness or tingling in the hands or feet.  Explain prevention techniques, such as     Use heavy moisturizers to lessen skin dryness and itching, but to avoid if skin is cracked or broken  Bathe in tepid water, use non-perfumed soap, and wash gently. Baths with oatmeal or diluted baking soda may be soothing.  Avoid tight fitting shoes and repetitive actions, such as rubbing hands or applying pressure to hands/feet.  Review measures to take should syndrome occur:  Cold compresses and elevation for          edema  Pain medications and other measures as ordered by oncologist.   4.   Syndrome resolves few weeks after therapy. 04/19/2024   S   5. DISCHARGE PLANNING /        EDUCATION 1.    Explain importance of compliance with follow- up  tests (CBC, CMP).  2.    Verify patient/caregiver know:  a.    Oncologists office phone number.  b.    Dates of follow-up appointments.  c.    Prescriptions given for nausea  3.   Review side effects to monitor and notify          oncologist about.  4.   Reinforce the need for patient and caregivers to:  a.    Review information given.  b.    Call oncologists office with questions  or symptoms  5.   Provide [facility] Brochure make referrals if needed for financial or .   04/19/2024   S     PROGRESS NOTES: I met with the patient Mr. Arenas today for chemotherapy education. he will be  starting treatment with Folfox and Avastin . We discussed the mechanism of action, potential side effects of this treatment as well as ways he can manage them at home. Some of these side effects include but or not limited to fever, nausea, vomiting, decreased appetite, fatigue, weakness, cytopenias, myalgia/arthralgia, constipation, diarrhea, bleeding, headache, shortness of breath, nail changes, taste change, hair thinning/loss, mood disturbances, or edema. We also discussed dietary modifications he should make although this will be discussed in more detail with the dietician. he was provided with anti-emetic medication, a copy of all of the information we discussed today as well as our contact information. he will be provided a schedule on his first day of treatment. We will obtain labs on a weekly basis and the patient will follow-up with the physician for toxicity monitoring throughout treatment. All questions were answered and an informed consent was obtained. he was reminded to certainly contact us sooner if needed.  Attached to the patients folder and discussed with the patient the 24 hour/ 7 days a week after hours telephone number for the physician.  Patient notified to call anytime 24/7 because their is a physician on call for any problems that may arise.  Patient also notified to report to [facility] if they can not get in touch with a physician after hours.  Discussed the five wishes booklet with the patient and their family.           Assessment/Plan:       1. Adenocarcinoma of rectum      Adenocarcinoma of rectum  -     Ambulatory referral/consult to [facility]  -     ondansetron (ZOFRAN) 8 MG tablet; Take 1 tablet (8 mg total) by mouth every 8 (eight) hours as needed for Nausea.  Dispense: 30 tablet; Refill: 2  -     promethazine (PHENERGAN) 25 MG tablet; Take 1 tablet (25 mg total) by mouth every 4 to 6 hours as needed for Nausea.  Dispense: 30 tablet; Refill: 2        I have explained and  the patient understands all of  the current recommendation(s). I have answered all of their questions to the best of my ability and to their complete satisfaction.   The patient is to continue with the current management plan.          Medications Ordered:  Zofran 8mg 1 tab PO Q8h prn nausea  Phenergan 25mg PO Q4-6h prn nausea  Emla cream: Apply to port site 30min prior to treatment and cover with saran wrap    Standing Labs Ordered:  CBC weekly  CMP weekly  CEA monthly   UA monthly    Total Face to Face Time: 60 minutes face to face with the patient and their family discussing the chemotherapy side-effects and when to call our office.   Electronically signed by: Electronically signed by: Suzy Funes, MSN, APRN, AGNP-C

## 2024-04-19 NOTE — TELEPHONE ENCOUNTER
Spoke with wife and informed her that the infusion dept would be the ones to talk to about his future chemo appts or when they see Suzy Argueta NP today she can go over all of that with them. Spouse verbalized understanding.

## 2024-04-22 ENCOUNTER — OFFICE VISIT (OUTPATIENT)
Dept: SURGERY | Facility: CLINIC | Age: 44
End: 2024-04-22
Payer: COMMERCIAL

## 2024-04-22 VITALS
HEIGHT: 75 IN | DIASTOLIC BLOOD PRESSURE: 88 MMHG | OXYGEN SATURATION: 97 % | HEART RATE: 77 BPM | SYSTOLIC BLOOD PRESSURE: 137 MMHG | BODY MASS INDEX: 28.85 KG/M2 | WEIGHT: 232.06 LBS

## 2024-04-22 DIAGNOSIS — C20 RECTAL ADENOCARCINOMA METASTATIC TO LIVER: ICD-10-CM

## 2024-04-22 DIAGNOSIS — C20 RECTAL CANCER: Primary | ICD-10-CM

## 2024-04-22 DIAGNOSIS — C78.7 METASTASIS TO LIVER: Primary | ICD-10-CM

## 2024-04-22 DIAGNOSIS — C78.7 RECTAL ADENOCARCINOMA METASTATIC TO LIVER: ICD-10-CM

## 2024-04-22 PROCEDURE — 45330 DIAGNOSTIC SIGMOIDOSCOPY: CPT | Mod: ,,, | Performed by: COLON & RECTAL SURGERY

## 2024-04-22 PROCEDURE — 99205 OFFICE O/P NEW HI 60 MIN: CPT | Mod: S$GLB,,, | Performed by: STUDENT IN AN ORGANIZED HEALTH CARE EDUCATION/TRAINING PROGRAM

## 2024-04-22 PROCEDURE — 99205 OFFICE O/P NEW HI 60 MIN: CPT | Mod: 25,S$GLB,, | Performed by: COLON & RECTAL SURGERY

## 2024-04-22 PROCEDURE — 99999 PR PBB SHADOW E&M-EST. PATIENT-LVL II: CPT | Mod: PBBFAC,,, | Performed by: STUDENT IN AN ORGANIZED HEALTH CARE EDUCATION/TRAINING PROGRAM

## 2024-04-22 PROCEDURE — 3008F BODY MASS INDEX DOCD: CPT | Mod: CPTII,S$GLB,, | Performed by: COLON & RECTAL SURGERY

## 2024-04-22 PROCEDURE — 99999 PR PBB SHADOW E&M-EST. PATIENT-LVL III: CPT | Mod: PBBFAC,,, | Performed by: COLON & RECTAL SURGERY

## 2024-04-22 PROCEDURE — 4010F ACE/ARB THERAPY RXD/TAKEN: CPT | Mod: CPTII,S$GLB,, | Performed by: COLON & RECTAL SURGERY

## 2024-04-22 PROCEDURE — 4010F ACE/ARB THERAPY RXD/TAKEN: CPT | Mod: CPTII,S$GLB,, | Performed by: STUDENT IN AN ORGANIZED HEALTH CARE EDUCATION/TRAINING PROGRAM

## 2024-04-22 PROCEDURE — 3075F SYST BP GE 130 - 139MM HG: CPT | Mod: CPTII,S$GLB,, | Performed by: COLON & RECTAL SURGERY

## 2024-04-22 PROCEDURE — 3079F DIAST BP 80-89 MM HG: CPT | Mod: CPTII,S$GLB,, | Performed by: COLON & RECTAL SURGERY

## 2024-04-22 PROCEDURE — 1159F MED LIST DOCD IN RCRD: CPT | Mod: CPTII,S$GLB,, | Performed by: COLON & RECTAL SURGERY

## 2024-04-22 PROCEDURE — 1160F RVW MEDS BY RX/DR IN RCRD: CPT | Mod: CPTII,S$GLB,, | Performed by: COLON & RECTAL SURGERY

## 2024-04-22 NOTE — LETTER
April 22, 2024      ELMO Samayoa MD  [address]  [address]  [place] [address]           [facility]  [facility] [address]  Phone: [phone]  Fax: [phone]          Patient: Yong Arenas   MR Number: [id number]   YOB: 1980   Date of Visit: 4/22/2024       Dear Dr Samayoa:    Thank you for referring Yong Arenas to me for evaluation. Attached you will find relevant portions of my assessment and plan of care.    If you have questions, please do not hesitate to call me. I look forward to following Yong Arenas along with you.    Sincerely,    Enrique Calero MD    Enclosure  CC:  MD Oliver King Jr., MD Anthony F. Albright, MD    If you would like to receive this communication electronically, please contact [email or web address] or [phone] to request more information on [facility] access.    For providers and/or their staff who would like to refer a patient to [facility], please contact us through our one-stop-shop provider referral line, [facility], at [phone].    If you feel you have received this communication in error or would no longer like to receive these types of communications, please e-mail [email or web address]

## 2024-04-22 NOTE — PROGRESS NOTES
"Subjective:       Patient ID: Yong Arenas is a 44 y.o. male.    Chief Complaint: Rectal Cancer    HPI  45 yo M referred for newly diagnosed rectal cancer.    He reports intermittent rectal bleeding over the past 2-3 years, more recently has noticed a  decrease in stool caliber and prolapsing rectal mass.    Colonoscopy performed 3/18/24 by Dr Goldsmith in [place] showed a "frond-like, villous, fungating, polypoid, sessile, infiltrative non-obstructing large mass in the rectum, non-circumferential, 5 cm in length" -  biopsies showed moderately differentiated adenocarcinoma arising from a TVA.  Also had a small cecal polyp (tubular adenoma) and 3 sessile sigmoid colon polyps (hyperplastic) removed.     CEA 36.9    CT/PET 4/2/24:  -There are 2 foci of abnormal activity in the liver highly suspicious for hepatic metastases.  The 1st is noted on PET axial image 129 with an SUV max of 3.9.  It is subcapsular in location near the dome of the liver at the junction of the right and left lobes.  No definite CT correlate noted.  The 2nd is noted on PET axial image 148, in the medial segment of the left lobe of the liver.  There is a faint CT correlate measuring 1.5 cm.  The SUV max is 4.3.  -There is intensely abnormal activity in the patient's known low rectal mass measuring approximately 4.5 cm in diameter on series 3, image 266 with an SUV max of 17.9.  No abnormal activity is noted in lymph nodes.  Note is made that there is a a 4 mm round left [place] rectal lymph node on series 3, image 252 which is suspicious based on morphology but too small to characterize with PET; this may be characterized further with MRI.  No enlarged lymph nodes are present.  There is no ascites.  -There is a 6 mm nodule in the right mid lung on series 3, image 103 which is most likely an intra fissural lymph node along the plane of the minor fissure.     MRI pelvis 4/2/24 (done at [place]): (Images personally reviewed.)  There is thickening of " the rectal wall beginning in approximately the mid lateral wall bilaterally and extending around the posterior aspect of the rectum. There is also evidence of a polypoid lesion extending into the lumen of the rectum which measures approximately 3.8 x 3.2 cm. This has some enhancement and probably represents neoplastic disease rather than stool. Clinical correlation with physical exam. This would be consistent with a history of neoplastic disease of the rectum.  The anterior wall of the rectum appears spared. There is no evidence of significant infiltration in the adjacent adipose tissue.  No definitive evidence of significant local invasion. Adjacent lymph nodes are within normal limits in size.    MRI abdomen 4/12/24: (Images personally reviewed.)  Several small rim enhancing lesions within the liver with associated diffusion restriction as above. Findings are suspicious for multifocal hepatic metastatic disease.     Attempted CT-guided biopsy of liver lesions seen on MRI 4/15/24:  Ill-defined rounded areas of low-attenuation are seen in the liver. The largest visualized hypoattenuating lesion in the left lobe of the liver measures 9 mm in diameter on this study (this was FDG avid on the previous PET-CT, and better seen on the diagnostic MRI). After several attempts at remeasuring/marking, an appropriate, safe and accessible window for a diagnostic tissue sample was not found, and the procedure was ended.     He reports no significant weight loss or change in appetite, he does c/o fatigue.  Only issue he is having with BM's is that the mass prolapses at times and required manual reduction.  He has had a port placed and plans are in place to start chemotherapy tomorrow (FOLFOX + Avastin).  Scheduled to see Surg Onc (Bla[facility]) today.    No family hx of CRC or IBD.    Review of patient's allergies indicates:  No Known Allergies    Past Medical History:   Diagnosis Date    Back pain     Cancer     Cervical pain     GERD  (gastroesophageal reflux disease)     HLD (hyperlipidemia)     Hypertension     Tinnitus, unspecified ear        Past Surgical History:   Procedure Laterality Date    EGD, WITH BALLOON DILATION OF LESS THAN 30 MILLIMETERS  2020    INSERTION OF TUNNELED CENTRAL VENOUS CATHETER (CVC) WITH SUBCUTANEOUS PORT Left 4/9/2024    Procedure: INSERTION, PORT-A-CATH;  Surgeon: Tash Castellanos MD;  Location: [facility];  Service: General;  Laterality: Left;  port placed to right chest , tunneled over to left chest, subclavian    WISDOM TOOTH EXTRACTION      x2       Current Outpatient Medications   Medication Sig Dispense Refill    loratadine-pseudoephedrine 5-120 mg (CLARITIN-D 12 HOUR) 5-120 mg per tablet Take by mouth once daily.      HYDROcodone-acetaminophen (NORCO) 5-325 mg per tablet Take 1 tablet by mouth every 8 (eight) hours as needed for Pain. (Patient not taking: Reported on 4/22/2024) 15 tablet 0    ibuprofen (ADVIL,MOTRIN) 200 MG tablet Take 200 mg by mouth every 6 (six) hours as needed for Pain. (Patient not taking: Reported on 4/22/2024)      losartan (COZAAR) 100 MG tablet Take 1 tablet by mouth once daily. (Patient not taking: Reported on 4/22/2024)      omeprazole (PRILOSEC) 40 MG capsule Take 1 capsule by mouth daily as needed. (Patient not taking: Reported on 4/22/2024)      ondansetron (ZOFRAN) 8 MG tablet Take 1 tablet (8 mg total) by mouth every 8 (eight) hours as needed for Nausea. (Patient not taking: Reported on 4/22/2024) 30 tablet 2    promethazine (PHENERGAN) 25 MG tablet Take 1 tablet (25 mg total) by mouth every 4 to 6 hours as needed for Nausea. (Patient not taking: Reported on 4/22/2024) 30 tablet 2    testosterone cypionate (DEPOTESTOTERONE CYPIONATE) 200 mg/mL injection Inject 1 mL into the muscle every 14 (fourteen) days. (Patient not taking: Reported on 4/22/2024)       No current facility-administered medications for this visit.       Family History   Problem Relation Name Age of Onset     Prostate cancer Father         Social History     Socioeconomic History    Marital status:    Tobacco Use    Smoking status: Former     Types: Cigarettes     Start date:      Quit date:      Years since quittin.3    Smokeless tobacco: Never   Substance and Sexual Activity    Alcohol use: Not Currently     Comment: social    Drug use: Never       Review of Systems   Constitutional:  Positive for fatigue. Negative for activity change, appetite change, chills, fever and unexpected weight change.   HENT:  Negative for congestion and sinus pressure.    Eyes:  Negative for visual disturbance.   Respiratory:  Negative for cough and shortness of breath.    Cardiovascular:  Negative for chest pain and palpitations.   Gastrointestinal:  Positive for anal bleeding. Negative for abdominal distention, abdominal pain, blood in stool, constipation, diarrhea, nausea, rectal pain and vomiting.        +Decreased caliber of stool   Endocrine: Negative for cold intolerance and heat intolerance.   Genitourinary:  Negative for dysuria and frequency.   Musculoskeletal:  Negative for arthralgias and back pain.   Skin:  Negative for rash.   Allergic/Immunologic: Negative for immunocompromised state.   Neurological:  Negative for dizziness, light-headedness and headaches.   Psychiatric/Behavioral:  Negative for confusion. The patient is not nervous/anxious.        Objective:      Physical Exam  Vitals reviewed. Exam conducted with a chaperone present.   Constitutional:       Appearance: He is well-developed.   HENT:      Head: Normocephalic and atraumatic.   Eyes:      Conjunctiva/sclera: Conjunctivae normal.   Pulmonary:      Effort: Pulmonary effort is normal. No respiratory distress.   Abdominal:      General: There is no distension.      Palpations: Abdomen is soft. There is no mass.      Tenderness: There is no abdominal tenderness. There is no guarding or rebound.   Genitourinary:     Comments: HILDA - good tone,  +palpable mass at tip of finger  Skin:     General: Skin is warm and dry.      Findings: No erythema.   Neurological:      Mental Status: He is alert and oriented to person, place, and time.         Flexible sigmoidoscopy - 4-5 cm polypoid mass left lateral/posterolateral position at top of anorectal ring - see Provation report for details.      Lab Results   Component Value Date    WBC 4.60 04/15/2024    HGB 14.1 04/15/2024    HCT 42.2 04/15/2024    MCV 87 04/15/2024     04/15/2024     BMP  Lab Results   Component Value Date     04/03/2024    K 3.7 04/03/2024     04/03/2024    CO2 28 04/03/2024    BUN 12 04/03/2024    CREATININE 0.9 04/03/2024    CALCIUM 9.1 04/03/2024    ANIONGAP 5 (L) 04/03/2024     CMP  Sodium   Date Value Ref Range Status   04/03/2024 139 136 - 145 mmol/L Final     Potassium   Date Value Ref Range Status   04/03/2024 3.7 3.5 - 5.1 mmol/L Final     Chloride   Date Value Ref Range Status   04/03/2024 106 95 - 110 mmol/L Final     CO2   Date Value Ref Range Status   04/03/2024 28 23 - 29 mmol/L Final     Glucose   Date Value Ref Range Status   04/03/2024 104 70 - 110 mg/dL Final     BUN   Date Value Ref Range Status   04/03/2024 12 6 - 20 mg/dL Final     Creatinine   Date Value Ref Range Status   04/03/2024 0.9 0.5 - 1.4 mg/dL Final     Calcium   Date Value Ref Range Status   04/03/2024 9.1 8.7 - 10.5 mg/dL Final     Total Protein   Date Value Ref Range Status   04/03/2024 6.8 6.0 - 8.4 g/dL Final     Albumin   Date Value Ref Range Status   04/03/2024 4.3 3.5 - 5.2 g/dL Final     Total Bilirubin   Date Value Ref Range Status   04/03/2024 0.3 0.1 - 1.0 mg/dL Final     Comment:     For infants and newborns, interpretation of results should be based  on gestational age, weight and in agreement with clinical  observations.    Premature Infant recommended reference ranges:  Up to 24 hours.............<8.0 mg/dL  Up to 48 hours............<12.0 mg/dL  3-5 days..................<15.0  "mg/dL  6-29 days.................<15.0 mg/dL       Alkaline Phosphatase   Date Value Ref Range Status   04/03/2024 60 55 - 135 U/L Final     AST   Date Value Ref Range Status   04/03/2024 26 10 - 40 U/L Final     ALT   Date Value Ref Range Status   04/03/2024 41 10 - 44 U/L Final     Anion Gap   Date Value Ref Range Status   04/03/2024 5 (L) 8 - 16 mmol/L Final     Lab Results   Component Value Date    CEA 36.9 (H) 04/12/2024           Assessment:       1. Rectal cancer        Plan:   -PET/CT shows 2 suspicious liver lesions, but IR attempts to biopsy were unsuccessful.  -CEA 36.9  -On the other hand, the pelvic MRI shows a "polypoid structure within the rectum with a stalk likely extending to the inferior left rectal wall and no suspicious perirectal nodes" and " No definitive evidence of significant local invasion. Adjacent lymph nodes are within normal limits in size."  -If he does not have metastatic disease to the liver and this is a superficial cancer in a polyp, we could potentially start with trans-anal excision, though significant elevation in CEA argues against this.  -If we do think he has limited metastatic liver disease, could still consider short course XRT prior to initiating chemo.  -His case is scheduled for discussion at metastatic liver [facility] on 4/25/24 and I will list him for discussion at [facility] on 5/1/24 - I have messaged his oncologist (Dr Samayoa) to request holding off on initiation of chemotherapy (especially Avastin) until we have discussed his case at the appropriate [facility]'s and have reviewed all available imaging in detail, and have a better understanding of what we are dealing with.    Time with patient: 65 minutes of total time spent on the encounter, more than 50% of which was spent in face-to-face counseling with patient and non-face to face time preparing to see the patient (eg, review of tests), Obtaining and/or reviewing separately obtained history, Documenting clinical " information in the electronic or other health record, Independently interpreting results (not separately reported) and communicating results to the patient/family/caregiver, or Care coordination (not separately reported).      Enrique Calero MD, FACS, FASCRS  , Department of Colon & Rectal Surgery     This note was created using voice recognition software, and may contain some unrecognized transcriptional errors.

## 2024-04-22 NOTE — PROVATION PATIENT INSTRUCTIONS
Discharge Summary/Instructions after an Endoscopic Procedure  Patient Name: Yong Arenas  Patient MRN: [id number]  Patient YOB: 1980 Monday, April 22, 2024  Enrique Calero MD  Dear patient,  As a result of recent federal legislation (The Federal Cures Act), you may   receive lab or pathology results from your procedure in your [facility]   account before your physician is able to contact you. Your physician or   their representative will relay the results to you with their   recommendations at their soonest availability.  Thank you,  RESTRICTIONS:  During your procedure today, you received medications for sedation.  These   medications may affect your judgment, balance and coordination.  Therefore,   for 24 hours, you have the following restrictions:   - DO NOT drive a car, operate machinery, make legal/financial decisions,   sign important papers or drink alcohol.    ACTIVITY:  Today: no heavy lifting, straining or running due to procedural   sedation/anesthesia.  The following day: return to full activity including work.  DIET:  Eat and drink normally unless instructed otherwise.     TREATMENT FOR COMMON SIDE EFFECTS:  - Mild abdominal pain, nausea, belching, bloating or excessive gas:  rest,   eat lightly and use a heating pad.  - Sore Throat: treat with throat lozenges and/or gargle with warm salt   water.  - Because air was used during the procedure, expelling large amounts of air   from your rectum or belching is normal.  - If a bowel prep was taken, you may not have a bowel movement for 1-3 days.    This is normal.  SYMPTOMS TO WATCH FOR AND REPORT TO YOUR PHYSICIAN:  1. Abdominal pain or bloating, other than gas cramps.  2. Chest pain.  3. Back pain.  4. Signs of infection such as: chills or fever occurring within 24 hours   after the procedure.  5. Rectal bleeding, which would show as bright red, maroon, or black stools.   (A tablespoon of blood from the rectum is not serious, especially if    hemorrhoids are present.)  6. Vomiting.  7. Weakness or dizziness.  GO DIRECTLY TO THE NEAREST EMERGENCY ROOM IF YOU HAVE ANY OF THE FOLLOWING:      Difficulty breathing              Chills and/or fever over 101 F   Persistent vomiting and/or vomiting blood   Severe abdominal pain   Severe chest pain   Black, tarry stools   Bleeding- more than one tablespoon   Any other symptom or condition that you feel may need urgent attention  Your doctor recommends these additional instructions:  If any biopsies were taken, your doctors clinic will contact you in 1 to 2   weeks with any results.  - Discharge patient to home.   - Resume previous diet.   - Case to be discussed at [facility].  For questions, problems or results please call your physician - Enrique Calero MD at Work:  [phone].  [facility], EMERGENCY ROOM PHONE NUMBER: [phone]  IF A COMPLICATION OR EMERGENCY SITUATION ARISES AND YOU ARE UNABLE TO REACH   YOUR PHYSICIAN - GO DIRECTLY TO THE EMERGENCY ROOM.  Enrique Calero MD  4/22/2024 11:33:19 AM  This report has been verified and signed electronically.  Dear patient,  As a result of recent federal legislation (The Federal Cures Act), you may   receive lab or pathology results from your procedure in your [facility]   account before your physician is able to contact you. Your physician or   their representative will relay the results to you with their   recommendations at their soonest availability.  Thank you,  PROVATION

## 2024-04-23 NOTE — PROGRESS NOTES
Surgical Oncology Clinic Note      Referring Provider: Dr. ELMO Samayoa   PCP: Oliver Hatch MD    Reason For Visit: Liver: colorectal metastases    Oncologic History:  3/8/2024: Colonoscopy - 5 cm fungating rectal lesion, adenocarcinoma  3/27/2024: PET - Avid low rectal lesion with perirectal node. Right lung nodule favored to be benign. 2 foci suspicious for liver metastasis  3/27/2024: Rectal MRI - polypoid lesion in rectum at site of diagnosed adenocarcinoma.   4/8/2024: MRI Abdomen - Numerous small enhancing lesions with restricted diffusion concerning for metastasis. Bilobar, most sub centimeter    History of Present Illness    Yong Arenas is a 44 y.o. 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 He noted increased frequency of rectal bleeding with bowel movements over the past year or so and recently experienced prolapsing rectal mass. He presented for a colonoscopy on 3/8/2024 in [place] and a fungating lesion was appreciated in the rectum, 5 cm in length. Biopsy demonstrated adenocarcinoma. Since his diagnosis, he has undergone staging workup with PETCT, rectal MRI and abdominal MRI. Abdominal MRI demonstrated numerous bilobar lesions, mostly sub centimeter, suspicious for metastasis which are too small for biopsy. He presents today after seeing Dr. Calero with colorectal surgery and undergoing a flex sig. He has already established with Dr. Samayoa with medical oncology and Dr. Arellano with radiation oncology.  Today he feels well though is eager to start treatment. He is currently scheduled for chemotherapy tomorrow.    Pathology: Outside report from [facility]  Colon-Rectum: Invasive adenocarcinoma, moderately differentiated in fragments of TVA with extensive high-grade dysplasia    Staging:  Cancer Staging   Rectal adenocarcinoma metastatic to liver  Staging  form: Colon and Rectum, AJCC 8th Edition  - Clinical stage from 2024: Stage IRINEO (cN1a, cM1a) - Signed by Nii Rudd Jr., MD on 2024       Past Medical History:   Diagnosis Date    Back pain     Cancer     Cervical pain     GERD (gastroesophageal reflux disease)     HLD (hyperlipidemia)     Hypertension     Tinnitus, unspecified ear        Past Surgical History:   Procedure Laterality Date    EGD, WITH BALLOON DILATION OF LESS THAN 30 MILLIMETERS      INSERTION OF TUNNELED CENTRAL VENOUS CATHETER (CVC) WITH SUBCUTANEOUS PORT Left 2024    Procedure: INSERTION, PORT-A-CATH;  Surgeon: Tash Castellanos MD;  Location: [facility];  Service: General;  Laterality: Left;  port placed to right chest , tunneled over to left chest, subclavian    WISDOM TOOTH EXTRACTION      x2       Family History   Problem Relation Name Age of Onset    Prostate cancer Father         Social History     Socioeconomic History    Marital status:    Tobacco Use    Smoking status: Former     Types: Cigarettes     Start date:      Quit date:      Years since quittin.3    Smokeless tobacco: Never   Substance and Sexual Activity    Alcohol use: Not Currently     Comment: social    Drug use: Never          Medication List            Accurate as of 2024 11:59 PM. If you have any questions, ask your nurse or doctor.                CONTINUE taking these medications      CLARITIN-D 12 HOUR 5-120 mg per tablet  Generic drug: loratadine-pseudoephedrine 5-120 mg     HYDROcodone-acetaminophen 5-325 mg per tablet  Commonly known as: NORCO  Take 1 tablet by mouth every 8 (eight) hours as needed for Pain.     ibuprofen 200 MG tablet  Commonly known as: ADVIL,MOTRIN     losartan 100 MG tablet  Commonly known as: COZAAR     omeprazole 40 MG capsule  Commonly known as: PRILOSEC     ondansetron 8 MG tablet  Commonly known as: ZOFRAN  Take 1 tablet (8 mg total) by mouth every 8 (eight) hours as needed for Nausea.      promethazine 25 MG tablet  Commonly known as: PHENERGAN  Take 1 tablet (25 mg total) by mouth every 4 to 6 hours as needed for Nausea.     testosterone cypionate 200 mg/mL injection  Commonly known as: DEPOTESTOTERONE CYPIONATE              Review of patient's allergies indicates:  No Known Allergies    Review of Systems   Constitutional:  Negative for chills, fever and weight loss.   HENT:  Negative for congestion and sore throat.    Eyes:  Negative for blurred vision.   Respiratory:  Negative for cough and shortness of breath.    Cardiovascular:  Negative for chest pain and leg swelling.   Gastrointestinal:  Positive for blood in stool. Negative for abdominal pain, constipation, diarrhea, heartburn, nausea and vomiting.   Genitourinary:  Negative for frequency and urgency.   Musculoskeletal:  Negative for back pain and joint pain.   Skin:  Negative for itching and rash.   Neurological:  Negative for weakness and headaches.   Psychiatric/Behavioral:  The patient is not nervous/anxious.          There were no vitals filed for this visit.  There is no height or weight on file to calculate BMI.  ECOG SCORE    0 - Fully active-able to carry on all pre-disease performance without restriction       Physical Exam  Vitals reviewed.   Constitutional:       General: He is not in acute distress.     Appearance: Normal appearance.   HENT:      Head: Normocephalic and atraumatic.      Mouth/Throat:      Mouth: Mucous membranes are moist.      Pharynx: Oropharynx is clear.   Eyes:      General: No scleral icterus.     Extraocular Movements: Extraocular movements intact.      Conjunctiva/sclera: Conjunctivae normal.   Cardiovascular:      Rate and Rhythm: Normal rate.   Pulmonary:      Effort: Pulmonary effort is normal. No respiratory distress.   Abdominal:      General: Abdomen is flat. There is no distension.      Palpations: Abdomen is soft. There is no mass.   Musculoskeletal:         General: No swelling or tenderness.  "Normal range of motion.      Cervical back: Normal range of motion and neck supple.   Skin:     General: Skin is warm and dry.      Coloration: Skin is not jaundiced.   Neurological:      General: No focal deficit present.      Mental Status: He is alert and oriented to person, place, and time.   Psychiatric:         Mood and Affect: Mood normal.         Behavior: Behavior normal.          DATA REVIEW:  I personally reviewed the following records for this visit: laboratory work from this visit, integration of today's labwork with previous laboratory visits, notes from other physicians, magnetic resonance/MR imaging, computed tomography/CT imaging, assessment of correct protocol, review of exclusion and inclusion criteria into clinical management protocol , surgical pathology results, endoscopy results, radiographic study evaluation, and radiation oncology notes, medical oncology notes and colorectal surgery notes    Labs:    Lab Results   Component Value Date    WBC 4.60 04/15/2024    HGB 14.1 04/15/2024    HCT 42.2 04/15/2024     04/15/2024     Lab Results   Component Value Date     04/03/2024    CALCIUM 9.1 04/03/2024    ALBUMIN 4.3 04/03/2024    PROT 6.8 04/03/2024     04/03/2024    K 3.7 04/03/2024    CO2 28 04/03/2024     04/03/2024    BUN 12 04/03/2024    CREATININE 0.9 04/03/2024    ALKPHOS 60 04/03/2024    ALT 41 04/03/2024    AST 26 04/03/2024    BILITOT 0.3 04/03/2024       Tumor Markers:  Lab Results   Component Value Date    CEA 36.9 (H) 04/12/2024     No results found for: ""      Radiographic Findings:  MRI ABDOMEN W WO CONTRAST     CLINICAL HISTORY:  Rectal carcinoma     COMPARISON:  PET CT April 2, 2024     FINDINGS:  Multiplanar pre and post infusion imaging was performed.  Hepatic protocol was utilized, including multiphase dynamic post infusion imaging targeted to the liver.     Prior PET CT demonstrated 2 foci of FDG uptake in the liver suspicious for metastases.  " Diffusion-weighted images demonstrate multiple small nodular foci of diffusion restriction scattered throughout the liver.  While some are difficult to visualize on standard sequences, most are T1 hypointense and T2 hyperintense.  Following the administration of gadolinium, T1 hypointense nodules demonstrate mild peripheral enhancement with partial centripetal fill-in on delayed images.  The appearance is suspicious for multiple small hepatic metastatic lesions.  Subcentimeter lesions are noted in the right hepatic lobe near the dome (series 12, image 15) in the posterior segment of the right hepatic lobe (image 27), and inferiorly in the right hepatic lobe (image 41).  In the medial segment of the left hepatic lobe anteriorly, there is a 14 mm lesion with similar enhancement characteristics (image 22), 11 mm lesion inferiorly in the medial segment of the left hepatic lobe (image 40), as well as a subcentimeter enhancing lesion in the lateral segment (image 40).  Additional tiny foci of diffusion restriction are noted elsewhere in the liver, not visualized on additional sequences likely because of their small size.     The liver is normal in size and contour.  Portal venous system is patent.  The gallbladder and biliary tree are unremarkable.     The spleen, pancreas, adrenal glands, and kidneys have a normal appearance.  The aorta is normal in caliber.  There is no free fluid or lymphadenopathy.     Impression:     1. Several small rim enhancing lesions within the liver with associated diffusion restriction as above.  Findings are suspicious for multifocal hepatic metastatic disease.  Details as above.        Electronically signed by:Warren Jo  Date:                                            04/12/2024  Time:                                           13:31      Assessment & Plan:  1. Metastasis to liver    2. Rectal adenocarcinoma metastatic to liver       Yong Arenas is a 44 y.o. male with diffuse,  bilobar, colorectal liver metastasis. I had a lengthy discussion today with Mr. Arenas and his wife regarding his diagnosis, work-up to this point, and recommended treatment plan going forward. He saw Dr. Calero with colorectal surgery today as well. On my personal review of his imaging, he does demonstrate numerous small lesions with restricted diffusion consistent with colorectal metastasis. These are too small for biopsy unfortunately. I discussed multidisciplinary treatment of metastatic colorectal cancer and my recommendation will be to proceed with short course chemoradiation to the rectum, followed by systemic therapy (about 6 cycles). At that time we can proceed with restaging to evaluate response of the primary and liver disease.  I reviewed all potential treatment modalities for colorectal liver metastases which includes systemic therapy, liver directed therapy, surgical resection or ablation, hepatic artery infusion pump or even liver transplantation. He expressed understanding that his treatment course will depend on his initial response to systemic therapy which will be reassessed on restaging imaging.    I also plan to present Mr. Arenas in multidisciplinary colorectal metastasis tumor board this upcoming week. Mr. Arenas and his wife asked many good questions today all of which were answered to their satisfaction.     Follow-up: Approximately 3-4 months for restaging after short course chemo RT and a 6 cycles of systemic therapy. This is all pending tumor board discussion this week.                 Nii Rudd Jr., MD              Surgical Oncology              [facility] [place], [place]              Office: [phone]              Fax: [phone]    Communications: 60 minutes were spent on today's visit in face-to-face and non face-to-face time with the patient. This patient was recently diagnosed with rectal adenocarcinoma with liver  metastasis and the time was required to provide counseling and guidance regarding their new diagnosis. Time was spent reviewing all outside records and information pertaining to their work-up and formulating a treatment plan in line with standardized guidelines. Additional time was spent communicating with referring physicians and facilities to facilitate the efficient exchange of previous healthcare records and radiographic imaging pertinent to the diagnosis and disease management.     Visit today included increased complexity associated with the care of the episodic problem metastatic rectal adenocarcinoma addressed and managing the longitudinal care of the patient due to the serious and/or complex managed problem(s).

## 2024-04-24 ENCOUNTER — TELEPHONE (OUTPATIENT)
Dept: HEMATOLOGY/ONCOLOGY | Facility: HOSPITAL | Age: 44
End: 2024-04-24

## 2024-04-25 ENCOUNTER — DOCUMENTATION ONLY (OUTPATIENT)
Dept: HEMATOLOGY/ONCOLOGY | Facility: CLINIC | Age: 44
End: 2024-04-25
Payer: COMMERCIAL

## 2024-04-25 NOTE — PROGRESS NOTES
[facility]     Colorectal Liver Metastasis Tumor Board Note      Date: 04/25/2024    Referring Provider: Dr. Rudd/Dr. Samayoa     Case Overview: Mr. Arenas (44M) was initially diagnosed in 03/2024 with rectal adenocarcinoma with synchronous liver metastasis. Has not yet started treatment.     Participants: medical oncology, surgical oncology, colorectal surgery, transplant surgeons, interventional radiology, and oncology navigator     Imaging Reviewed: PET, MRI    Radiology Review: Peripherally enhancing subcentimeter lesions in the right hepatic lobe and segment 4 concerning for metastatic disease,. 2 of which correspond to PET avid lesions. Right lung nodule of unknow etiology but too small to characterize on PET, recommend continued follow up.     Orders/Referrals: none placed.     Board Recommendations: Recommend proceeding with upfront systemic chemotherapy with consideration of short course radiation. Consider referral to colorectal surgery.

## 2024-04-25 NOTE — TELEPHONE ENCOUNTER
Nay,  I will need to talk [place]. Enrique Calero[place] [facility] [place] about Mr. Arenas.

## 2024-04-26 ENCOUNTER — PATIENT MESSAGE (OUTPATIENT)
Dept: HEMATOLOGY/ONCOLOGY | Facility: CLINIC | Age: 44
End: 2024-04-26

## 2024-04-26 PROBLEM — C78.7 RECTAL ADENOCARCINOMA METASTATIC TO LIVER: Status: ACTIVE | Noted: 2024-04-26

## 2024-04-26 PROBLEM — C20 RECTAL ADENOCARCINOMA METASTATIC TO LIVER: Status: ACTIVE | Noted: 2024-04-26

## 2024-04-30 ENCOUNTER — DOCUMENTATION ONLY (OUTPATIENT)
Dept: HEMATOLOGY/ONCOLOGY | Facility: CLINIC | Age: 44
End: 2024-04-30
Payer: COMMERCIAL

## 2024-05-01 ENCOUNTER — TELEPHONE (OUTPATIENT)
Dept: HEMATOLOGY/ONCOLOGY | Facility: CLINIC | Age: 44
End: 2024-05-01
Payer: COMMERCIAL

## 2024-05-01 ENCOUNTER — TELEPHONE (OUTPATIENT)
Dept: HEMATOLOGY/ONCOLOGY | Facility: CLINIC | Age: 44
End: 2024-05-01

## 2024-05-01 NOTE — PROGRESS NOTES
Colorectal Tumor Board  Cancer Genetics Review      Name Yong Arenas     1980    MRN [id number]      Date of Tumor Board:  24    Case reviewed.  Needs a referral to cancer genetics. We can do a virtual visit when he's in [place] for chemo and ship a test kit to his home.      Yola Saha PA-C  Cancer Genetics  [facility]

## 2024-05-06 ENCOUNTER — SOCIAL WORK (OUTPATIENT)
Dept: HEMATOLOGY/ONCOLOGY | Facility: CLINIC | Age: 44
End: 2024-05-06

## 2024-05-06 ENCOUNTER — DOCUMENTATION ONLY (OUTPATIENT)
Dept: HEMATOLOGY/ONCOLOGY | Facility: CLINIC | Age: 44
End: 2024-05-06

## 2024-05-06 ENCOUNTER — INFUSION (OUTPATIENT)
Dept: INFUSION THERAPY | Facility: HOSPITAL | Age: 44
End: 2024-05-06
Attending: INTERNAL MEDICINE
Payer: COMMERCIAL

## 2024-05-06 VITALS
WEIGHT: 229.38 LBS | BODY MASS INDEX: 28.52 KG/M2 | SYSTOLIC BLOOD PRESSURE: 142 MMHG | TEMPERATURE: 98 F | DIASTOLIC BLOOD PRESSURE: 83 MMHG | HEIGHT: 75 IN | HEART RATE: 68 BPM | OXYGEN SATURATION: 98 % | RESPIRATION RATE: 18 BRPM

## 2024-05-06 DIAGNOSIS — C20 RECTAL CANCER METASTASIZED TO LIVER: Primary | ICD-10-CM

## 2024-05-06 DIAGNOSIS — C78.7 METASTASIS TO LIVER: ICD-10-CM

## 2024-05-06 DIAGNOSIS — C78.7 RECTAL CANCER METASTASIZED TO LIVER: Primary | ICD-10-CM

## 2024-05-06 PROCEDURE — 96417 CHEMO IV INFUS EACH ADDL SEQ: CPT

## 2024-05-06 PROCEDURE — 96367 TX/PROPH/DG ADDL SEQ IV INF: CPT

## 2024-05-06 PROCEDURE — 96411 CHEMO IV PUSH ADDL DRUG: CPT

## 2024-05-06 PROCEDURE — 96368 THER/DIAG CONCURRENT INF: CPT

## 2024-05-06 PROCEDURE — 96415 CHEMO IV INFUSION ADDL HR: CPT

## 2024-05-06 PROCEDURE — 63600175 PHARM REV CODE 636 W HCPCS: Performed by: INTERNAL MEDICINE

## 2024-05-06 PROCEDURE — 25000003 PHARM REV CODE 250: Performed by: INTERNAL MEDICINE

## 2024-05-06 PROCEDURE — 96413 CHEMO IV INFUSION 1 HR: CPT

## 2024-05-06 PROCEDURE — 96416 CHEMO PROLONG INFUSE W/PUMP: CPT

## 2024-05-06 RX ORDER — PROCHLORPERAZINE EDISYLATE 5 MG/ML
10 INJECTION INTRAMUSCULAR; INTRAVENOUS ONCE AS NEEDED
Status: DISCONTINUED | OUTPATIENT
Start: 2024-05-06 | End: 2024-05-06 | Stop reason: HOSPADM

## 2024-05-06 RX ORDER — FLUOROURACIL 50 MG/ML
400 INJECTION, SOLUTION INTRAVENOUS
Status: COMPLETED | OUTPATIENT
Start: 2024-05-06 | End: 2024-05-06

## 2024-05-06 RX ORDER — SODIUM CHLORIDE 0.9 % (FLUSH) 0.9 %
10 SYRINGE (ML) INJECTION
Status: DISCONTINUED | OUTPATIENT
Start: 2024-05-06 | End: 2024-05-06 | Stop reason: HOSPADM

## 2024-05-06 RX ORDER — EPINEPHRINE 0.3 MG/.3ML
0.3 INJECTION SUBCUTANEOUS ONCE AS NEEDED
Status: DISCONTINUED | OUTPATIENT
Start: 2024-05-06 | End: 2024-05-06 | Stop reason: HOSPADM

## 2024-05-06 RX ORDER — DIPHENHYDRAMINE HYDROCHLORIDE 50 MG/ML
50 INJECTION INTRAMUSCULAR; INTRAVENOUS ONCE AS NEEDED
Status: DISCONTINUED | OUTPATIENT
Start: 2024-05-06 | End: 2024-05-06 | Stop reason: HOSPADM

## 2024-05-06 RX ADMIN — DEXTROSE MONOHYDRATE 935 MG: 50 INJECTION, SOLUTION INTRAVENOUS at 09:05

## 2024-05-06 RX ADMIN — OXALIPLATIN 199 MG: 5 INJECTION, SOLUTION INTRAVENOUS at 09:05

## 2024-05-06 RX ADMIN — BEVACIZUMAB-AWWB 500 MG: 400 INJECTION, SOLUTION INTRAVENOUS at 08:05

## 2024-05-06 RX ADMIN — FLUOROURACIL 935 MG: 50 INJECTION, SOLUTION INTRAVENOUS at 11:05

## 2024-05-06 RX ADMIN — FLUOROURACIL 5615 MG: 50 INJECTION, SOLUTION INTRAVENOUS at 11:05

## 2024-05-06 RX ADMIN — DEXTROSE MONOHYDRATE: 50 INJECTION, SOLUTION INTRAVENOUS at 08:05

## 2024-05-06 RX ADMIN — DEXAMETHASONE SODIUM PHOSPHATE 0.25 MG: 4 INJECTION, SOLUTION INTRA-ARTICULAR; INTRALESIONAL; INTRAMUSCULAR; INTRAVENOUS; SOFT TISSUE at 08:05

## 2024-05-06 NOTE — PLAN OF CARE
Problem: Fatigue  Goal: Improved Activity Tolerance  Outcome: Progressing  Intervention: Promote Improved Energy  Flowsheets (Taken 5/6/2024 8129)  Fatigue Management: frequent rest breaks encouraged  Sleep/Rest Enhancement: regular sleep/rest pattern promoted  Activity Management:   Ambulated -L4   Up in chair - L3  Environmental Support:   calm environment promoted   rest periods encouraged

## 2024-05-06 NOTE — PROGRESS NOTES
CHEMO SCHOOL- NUTRITION    Yong Arenas is a 44 y.o. male with metastatic rectal cancer. Pt will be receiving folfox. I met with Mr. Arenas for chemo school today. Pt reports excellent appetite and intake. He denies any recent unintentional weight changes. He reports excellent hydration since his diagnosis. He is drinking sour sop tea 1x daily and also eating 3 apricot seeds per day. 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 He denies having any nutrition related questions or concerns at the current time.     CW: 229#.     Food Insecurity:  Patient is worried whether their food would run out before they have more money to buy more: Never  The food they bought just didn't last and they didn't have money to buy more: Never      Plan:   Reviewed chemo school packet & provided copy to pt.   Discussed importance of maintaining wt & staying hydrated.   Reviewed food safety guidelines recommended during treatment.   Discussed caution with herbal supplements during chemotherapy  Provided RD contact info & encouraged pt to call with any questions/concerns.   Will f/u as needed.       Electronically signed by: Deepika Parks MBA, RDN, LDN

## 2024-05-06 NOTE — NURSING
"Pt's port is very positional. There was no problem accessing his port and blood return was noted when accessed. Flushed easily. Several times throughout his infusion the clinic infusion pump would beep due to "occlusion on patient side". Pt would then reposition and the infusion would resume. Dr Samayoa aware of situation. Pt and spouse was educated on care of take-home pump and was instructed on what to do if it beeps. Pt and spouse verbalized understanding.    Andra Mena RN  "

## 2024-05-07 NOTE — PROGRESS NOTES
Yong Arenas is a 44 year old diagnosed with rectal cancer.  I initially met him during his radiation consult with Dr. Arellano.  I met with patient today during his chemo infusion to update his distress screening; he indicated a rating of 0.  Patient denied needing psychosocial support at this time.

## 2024-05-08 ENCOUNTER — INFUSION (OUTPATIENT)
Dept: INFUSION THERAPY | Facility: HOSPITAL | Age: 44
End: 2024-05-08
Attending: INTERNAL MEDICINE
Payer: COMMERCIAL

## 2024-05-08 VITALS
BODY MASS INDEX: 28.81 KG/M2 | WEIGHT: 231.69 LBS | HEART RATE: 65 BPM | TEMPERATURE: 97 F | HEIGHT: 75 IN | RESPIRATION RATE: 15 BRPM | DIASTOLIC BLOOD PRESSURE: 87 MMHG | SYSTOLIC BLOOD PRESSURE: 138 MMHG | OXYGEN SATURATION: 97 %

## 2024-05-08 DIAGNOSIS — C78.7 METASTASIS TO LIVER: Primary | ICD-10-CM

## 2024-05-08 PROCEDURE — A4216 STERILE WATER/SALINE, 10 ML: HCPCS | Performed by: INTERNAL MEDICINE

## 2024-05-08 PROCEDURE — 25000003 PHARM REV CODE 250: Performed by: INTERNAL MEDICINE

## 2024-05-08 PROCEDURE — 63600175 PHARM REV CODE 636 W HCPCS: Performed by: INTERNAL MEDICINE

## 2024-05-08 PROCEDURE — 96523 IRRIG DRUG DELIVERY DEVICE: CPT

## 2024-05-08 RX ORDER — SODIUM CHLORIDE 0.9 % (FLUSH) 0.9 %
10 SYRINGE (ML) INJECTION
Status: DISCONTINUED | OUTPATIENT
Start: 2024-05-08 | End: 2024-05-08 | Stop reason: HOSPADM

## 2024-05-08 RX ORDER — PROCHLORPERAZINE EDISYLATE 5 MG/ML
10 INJECTION INTRAMUSCULAR; INTRAVENOUS ONCE AS NEEDED
Status: DISCONTINUED | OUTPATIENT
Start: 2024-05-08 | End: 2024-05-08 | Stop reason: HOSPADM

## 2024-05-08 RX ORDER — HEPARIN 100 UNIT/ML
500 SYRINGE INTRAVENOUS
Status: DISCONTINUED | OUTPATIENT
Start: 2024-05-08 | End: 2024-05-08 | Stop reason: HOSPADM

## 2024-05-08 RX ADMIN — HEPARIN 500 UNITS: 100 SYRINGE at 09:05

## 2024-05-08 RX ADMIN — SODIUM CHLORIDE, PRESERVATIVE FREE 10 ML: 5 INJECTION INTRAVENOUS at 09:05

## 2024-05-08 NOTE — PLAN OF CARE
Problem: Infection  Goal: Absence of Infection Signs and Symptoms  Outcome: Progressing  Intervention: Prevent or Manage Infection  Flowsheets (Taken 5/8/2024 0910)  Infection Management: aseptic technique maintained  Isolation Precautions:   precautions initiated   precautions maintained

## 2024-05-10 ENCOUNTER — TELEPHONE (OUTPATIENT)
Dept: HEMATOLOGY/ONCOLOGY | Facility: CLINIC | Age: 44
End: 2024-05-10

## 2024-05-18 RX ORDER — FLUOROURACIL 50 MG/ML
400 INJECTION, SOLUTION INTRAVENOUS
Status: CANCELLED | OUTPATIENT
Start: 2024-05-20

## 2024-05-18 RX ORDER — SODIUM CHLORIDE 0.9 % (FLUSH) 0.9 %
10 SYRINGE (ML) INJECTION
Status: CANCELLED | OUTPATIENT
Start: 2024-05-22

## 2024-05-18 RX ORDER — HEPARIN 100 UNIT/ML
500 SYRINGE INTRAVENOUS
Status: CANCELLED | OUTPATIENT
Start: 2024-05-20

## 2024-05-18 RX ORDER — DIPHENHYDRAMINE HYDROCHLORIDE 50 MG/ML
50 INJECTION INTRAMUSCULAR; INTRAVENOUS ONCE AS NEEDED
Status: CANCELLED | OUTPATIENT
Start: 2024-05-20

## 2024-05-18 RX ORDER — EPINEPHRINE 0.3 MG/.3ML
0.3 INJECTION SUBCUTANEOUS ONCE AS NEEDED
Status: CANCELLED | OUTPATIENT
Start: 2024-05-20

## 2024-05-18 RX ORDER — PROCHLORPERAZINE EDISYLATE 5 MG/ML
10 INJECTION INTRAMUSCULAR; INTRAVENOUS ONCE AS NEEDED
Status: CANCELLED | OUTPATIENT
Start: 2024-05-22

## 2024-05-18 RX ORDER — HEPARIN 100 UNIT/ML
500 SYRINGE INTRAVENOUS
Status: CANCELLED | OUTPATIENT
Start: 2024-05-22

## 2024-05-18 RX ORDER — SODIUM CHLORIDE 0.9 % (FLUSH) 0.9 %
10 SYRINGE (ML) INJECTION
Status: CANCELLED | OUTPATIENT
Start: 2024-05-20

## 2024-05-18 RX ORDER — PROCHLORPERAZINE EDISYLATE 5 MG/ML
10 INJECTION INTRAMUSCULAR; INTRAVENOUS ONCE AS NEEDED
Status: CANCELLED | OUTPATIENT
Start: 2024-05-20

## 2024-05-20 ENCOUNTER — INFUSION (OUTPATIENT)
Dept: INFUSION THERAPY | Facility: HOSPITAL | Age: 44
End: 2024-05-20
Attending: INTERNAL MEDICINE
Payer: COMMERCIAL

## 2024-05-20 ENCOUNTER — OFFICE VISIT (OUTPATIENT)
Facility: CLINIC | Age: 44
End: 2024-05-20
Payer: COMMERCIAL

## 2024-05-20 VITALS
HEIGHT: 75 IN | DIASTOLIC BLOOD PRESSURE: 85 MMHG | WEIGHT: 223.63 LBS | RESPIRATION RATE: 16 BRPM | BODY MASS INDEX: 27.81 KG/M2 | TEMPERATURE: 97 F | SYSTOLIC BLOOD PRESSURE: 141 MMHG | HEART RATE: 73 BPM

## 2024-05-20 VITALS
OXYGEN SATURATION: 95 % | DIASTOLIC BLOOD PRESSURE: 92 MMHG | TEMPERATURE: 98 F | HEART RATE: 70 BPM | SYSTOLIC BLOOD PRESSURE: 147 MMHG | RESPIRATION RATE: 18 BRPM

## 2024-05-20 DIAGNOSIS — C20 RECTAL ADENOCARCINOMA METASTATIC TO LIVER: Primary | ICD-10-CM

## 2024-05-20 DIAGNOSIS — K13.79 MOUTH SORES: ICD-10-CM

## 2024-05-20 DIAGNOSIS — C78.7 METASTASIS TO LIVER: Primary | ICD-10-CM

## 2024-05-20 DIAGNOSIS — C78.7 RECTAL ADENOCARCINOMA METASTATIC TO LIVER: Primary | ICD-10-CM

## 2024-05-20 PROCEDURE — G2211 COMPLEX E/M VISIT ADD ON: HCPCS | Mod: S$GLB,,, | Performed by: NURSE PRACTITIONER

## 2024-05-20 PROCEDURE — 99999 PR PBB SHADOW E&M-EST. PATIENT-LVL III: CPT | Mod: PBBFAC,,, | Performed by: NURSE PRACTITIONER

## 2024-05-20 PROCEDURE — 3008F BODY MASS INDEX DOCD: CPT | Mod: CPTII,S$GLB,, | Performed by: NURSE PRACTITIONER

## 2024-05-20 PROCEDURE — 3079F DIAST BP 80-89 MM HG: CPT | Mod: CPTII,S$GLB,, | Performed by: NURSE PRACTITIONER

## 2024-05-20 PROCEDURE — 1159F MED LIST DOCD IN RCRD: CPT | Mod: CPTII,S$GLB,, | Performed by: NURSE PRACTITIONER

## 2024-05-20 PROCEDURE — 3077F SYST BP >= 140 MM HG: CPT | Mod: CPTII,S$GLB,, | Performed by: NURSE PRACTITIONER

## 2024-05-20 PROCEDURE — 4010F ACE/ARB THERAPY RXD/TAKEN: CPT | Mod: CPTII,S$GLB,, | Performed by: NURSE PRACTITIONER

## 2024-05-20 PROCEDURE — 96367 TX/PROPH/DG ADDL SEQ IV INF: CPT

## 2024-05-20 PROCEDURE — 96417 CHEMO IV INFUS EACH ADDL SEQ: CPT

## 2024-05-20 PROCEDURE — 25000003 PHARM REV CODE 250: Performed by: INTERNAL MEDICINE

## 2024-05-20 PROCEDURE — 99215 OFFICE O/P EST HI 40 MIN: CPT | Mod: S$GLB,,, | Performed by: NURSE PRACTITIONER

## 2024-05-20 PROCEDURE — 63600175 PHARM REV CODE 636 W HCPCS: Performed by: INTERNAL MEDICINE

## 2024-05-20 PROCEDURE — 96411 CHEMO IV PUSH ADDL DRUG: CPT

## 2024-05-20 PROCEDURE — 96368 THER/DIAG CONCURRENT INF: CPT

## 2024-05-20 PROCEDURE — 96413 CHEMO IV INFUSION 1 HR: CPT

## 2024-05-20 PROCEDURE — 96415 CHEMO IV INFUSION ADDL HR: CPT

## 2024-05-20 PROCEDURE — 96416 CHEMO PROLONG INFUSE W/PUMP: CPT

## 2024-05-20 RX ORDER — PROCHLORPERAZINE EDISYLATE 5 MG/ML
10 INJECTION INTRAMUSCULAR; INTRAVENOUS ONCE AS NEEDED
Status: DISCONTINUED | OUTPATIENT
Start: 2024-05-20 | End: 2024-05-20 | Stop reason: HOSPADM

## 2024-05-20 RX ORDER — EPINEPHRINE 0.3 MG/.3ML
0.3 INJECTION SUBCUTANEOUS ONCE AS NEEDED
Status: DISCONTINUED | OUTPATIENT
Start: 2024-05-20 | End: 2024-05-20 | Stop reason: HOSPADM

## 2024-05-20 RX ORDER — DIPHENHYDRAMINE HYDROCHLORIDE 50 MG/ML
50 INJECTION INTRAMUSCULAR; INTRAVENOUS ONCE AS NEEDED
Status: DISCONTINUED | OUTPATIENT
Start: 2024-05-20 | End: 2024-05-20 | Stop reason: HOSPADM

## 2024-05-20 RX ORDER — HEPARIN 100 UNIT/ML
500 SYRINGE INTRAVENOUS
Status: DISCONTINUED | OUTPATIENT
Start: 2024-05-20 | End: 2024-05-20 | Stop reason: HOSPADM

## 2024-05-20 RX ORDER — SODIUM CHLORIDE 0.9 % (FLUSH) 0.9 %
10 SYRINGE (ML) INJECTION
Status: DISCONTINUED | OUTPATIENT
Start: 2024-05-20 | End: 2024-05-20 | Stop reason: HOSPADM

## 2024-05-20 RX ORDER — FLUOROURACIL 50 MG/ML
400 INJECTION, SOLUTION INTRAVENOUS
Status: COMPLETED | OUTPATIENT
Start: 2024-05-20 | End: 2024-05-20

## 2024-05-20 RX ADMIN — FLUOROURACIL 935 MG: 50 INJECTION, SOLUTION INTRAVENOUS at 01:05

## 2024-05-20 RX ADMIN — OXALIPLATIN 199 MG: 5 INJECTION, SOLUTION INTRAVENOUS at 11:05

## 2024-05-20 RX ADMIN — PALONOSETRON HYDROCHLORIDE 0.25 MG: 0.25 INJECTION INTRAVENOUS at 11:05

## 2024-05-20 RX ADMIN — FLUOROURACIL 5615 MG: 50 INJECTION, SOLUTION INTRAVENOUS at 01:05

## 2024-05-20 RX ADMIN — DEXTROSE MONOHYDRATE 935 MG: 50 INJECTION, SOLUTION INTRAVENOUS at 11:05

## 2024-05-20 RX ADMIN — BEVACIZUMAB-AWWB 500 MG: 400 INJECTION, SOLUTION INTRAVENOUS at 09:05

## 2024-05-20 NOTE — NURSING
1015 pt started developing flushing, tickle to the throat, Chest burning  during MVASI infusion. Pt denies and SOB. Infusion stopped at this time see flow sheet for VS. 1025 Suzy [place] NP at chairside to assess. Symptoms are resolving on own.Order received to restart infusion once all symptoms are gone and infuse at slower rate. 1050. Pt states all symptoms have resolved. MVASI restarted at hour rate. Will cont to monitor pt. 1110 Infusion completed pt tolerated the remaining infusion without any new symptoms.

## 2024-05-20 NOTE — PLAN OF CARE
Problem: Chemotherapy Effects  Goal: Anemia Symptom Improvement  Outcome: Progressing  Goal: Safety Maintained  Outcome: Progressing  Goal: Absence of Hematuria  Outcome: Progressing  Goal: Nausea and Vomiting Relief  Outcome: Progressing  Goal: Neurotoxicity Symptom Control  Outcome: Progressing  Goal: Absence of Infection  Outcome: Progressing  Goal: Absence of Bleeding  Outcome: Progressing

## 2024-05-20 NOTE — PROGRESS NOTES
[facility] HEMATOLGY ONCOLOGY CONSULTATION    Subjective:       Patient ID: Yong Arenas is a 44 y.o. male returning today for a regularly scheduled follow-up visit.    Chief Complaint: Rectal Cancer with mets to liver       HPI  The patient is here today with his wife to discuss his treatment plan.   He is feeling great today.   Due for cycle 2 of Folfox and Avastin.     He does feel some cold sensitivity.   No nausea or vomiting.     The plan after [facility] Tumor board is to do 6 cycles of Folfox and Avastin, then restage and hopefully move to chemo/radiation and re present at the board.     He has been getting labs done at [place].         Past Medical History:   Diagnosis Date    Back pain     Cancer     Cervical pain     GERD (gastroesophageal reflux disease)     HLD (hyperlipidemia)     Hypertension     Tinnitus, unspecified ear        Past Surgical History:   Procedure Laterality Date    EGD, WITH BALLOON DILATION OF LESS THAN 30 MILLIMETERS      INSERTION OF TUNNELED CENTRAL VENOUS CATHETER (CVC) WITH SUBCUTANEOUS PORT Left 2024    Procedure: INSERTION, PORT-A-CATH;  Surgeon: Tash Castellanos MD;  Location: [facility] OR;  Service: General;  Laterality: Left;  port placed to right chest , tunneled over to left chest, subclavian    WISDOM TOOTH EXTRACTION      x2       Social History     Socioeconomic History    Marital status:    Tobacco Use    Smoking status: Former     Types: Cigarettes     Start date:      Quit date:      Years since quittin.4    Smokeless tobacco: Never   Substance and Sexual Activity    Alcohol use: Not Currently     Comment: social    Drug use: Never       Family History   Problem Relation Name Age of Onset    Cancer Mother          liposarcoma on leg    Prostate cancer Father      Colon polyps Father      Leukemia Niece         Review of patient's allergies indicates:  No Known Allergies    Current Outpatient Medications:     ibuprofen (ADVIL,MOTRIN) 200 MG tablet,  Take 200 mg by mouth every 6 (six) hours as needed for Pain., Disp: , Rfl:     loratadine-pseudoephedrine 5-120 mg (CLARITIN-D 12 HOUR) 5-120 mg per tablet, Take by mouth once daily., Disp: , Rfl:     losartan (COZAAR) 100 MG tablet, Take 1 tablet by mouth once daily., Disp: , Rfl:     omeprazole (PRILOSEC) 40 MG capsule, Take 1 capsule by mouth daily as needed., Disp: , Rfl:     testosterone cypionate (DEPOTESTOTERONE CYPIONATE) 200 mg/mL injection, Inject 1 mL into the muscle every 14 (fourteen) days., Disp: , Rfl:     duke's soln (benadryl 30 mL, mylanta 30 mL, LIDOcaine 30 mL, nystatin 30 mL) 120mL, Take 10 mLs by mouth 4 (four) times daily., Disp: 120 mL, Rfl: 0    HYDROcodone-acetaminophen (NORCO) 5-325 mg per tablet, Take 1 tablet by mouth every 8 (eight) hours as needed for Pain. (Patient not taking: Reported on 4/22/2024), Disp: 15 tablet, Rfl: 0    ondansetron (ZOFRAN) 8 MG tablet, Take 1 tablet (8 mg total) by mouth every 8 (eight) hours as needed for Nausea. (Patient not taking: Reported on 4/22/2024), Disp: 30 tablet, Rfl: 2    promethazine (PHENERGAN) 25 MG tablet, Take 1 tablet (25 mg total) by mouth every 4 to 6 hours as needed for Nausea. (Patient not taking: Reported on 4/22/2024), Disp: 30 tablet, Rfl: 2    All medications and past history have been reviewed.    Review of Systems   Constitutional:  Negative for activity change, appetite change, fatigue and fever.   HENT:  Negative for congestion, mouth sores, postnasal drip, rhinorrhea, sinus pressure, sore throat and trouble swallowing.    Eyes:  Negative for photophobia and visual disturbance.   Respiratory:  Negative for cough, chest tightness, shortness of breath and wheezing.    Cardiovascular:  Negative for chest pain and leg swelling.   Gastrointestinal:  Positive for anal bleeding. Negative for abdominal distention, abdominal pain, constipation, diarrhea, nausea and vomiting.   Endocrine: Positive for cold intolerance.   Genitourinary:   "Negative for decreased urine volume, dysuria and frequency.   Musculoskeletal:  Negative for arthralgias and myalgias.   Skin:  Negative for pallor and rash.   Allergic/Immunologic: Negative for immunocompromised state.   Neurological:  Negative for dizziness, syncope, weakness, light-headedness, numbness and headaches.   Hematological:  Negative for adenopathy. Does not bruise/bleed easily.   Psychiatric/Behavioral:  Negative for sleep disturbance. The patient is not nervous/anxious.        Objective:        BP (!) 141/85 (BP Location: Right arm)   Pulse 73   Temp 97.3 °F (36.3 °C)   Resp 16   Ht 6' 3" (1.905 m)   Wt 101.4 kg (223 lb 9.6 oz)   BMI 27.95 kg/m²     Physical Exam  Constitutional:       Appearance: Normal appearance.   HENT:      Head: Normocephalic and atraumatic.      Mouth/Throat:      Mouth: Mucous membranes are moist.   Cardiovascular:      Rate and Rhythm: Normal rate and regular rhythm.      Pulses: Normal pulses.      Heart sounds: Normal heart sounds.   Pulmonary:      Effort: Pulmonary effort is normal. No respiratory distress.      Breath sounds: Normal breath sounds. No wheezing.   Abdominal:      General: There is no distension.      Palpations: Abdomen is soft. There is no mass.      Tenderness: There is no abdominal tenderness.   Musculoskeletal:         General: No swelling. Normal range of motion.      Right lower leg: No edema.      Left lower leg: No edema.   Skin:     General: Skin is warm and dry.      Capillary Refill: Capillary refill takes 2 to 3 seconds.      Findings: No bruising or rash.   Neurological:      Mental Status: He is alert and oriented to person, place, and time. Mental status is at baseline.      Motor: No weakness.   Psychiatric:         Mood and Affect: Mood normal.         Behavior: Behavior normal.           Lab:                                            Radiology/Diagnostic Studies:  Results for orders placed or performed during the hospital encounter " of 04/02/24 (from the past 2160 hour(s))   NM PET CT FDG Skull Base to Mid Thigh    Impression    Low rectal carcinoma with findings highly suspicious for metastatic disease to the liver.    Tiny left perirectal lymph node is suspicious based on morphology but too small to characterize with PET.  This may be assessed further with MRI.    Right lung nodule favored to correspond to benign intra fissural lymph node, but difficult to confirm intra fissural location on current CT images.  Follow-up with conventional chest CT would be helpful.      Electronically signed by: Sharon Durant  Date:    04/02/2024  Time:    10:21     Results for orders placed or performed during the hospital encounter of 04/15/24 (from the past 2160 hour(s))   CT Abdomen Without Contrast    Narrative    CMS MANDATED QUALITY DATA - CT RADIATION - 436    All CT scans at this facility utilize dose modulation, iterative reconstruction, and/or weight based dosing when appropriate to reduce radiation dose to as low as reasonably achievable.    CLINICAL HISTORY:  ([id number])45 y/o  (1980) M    Rectal cancer, staging; Malignant neoplasm of rectum    TECHNIQUE:  (A#[id number], exam time 4/15/2024 9:50)    CT ABDOMEN WITHOUT CONTRAST [id number]    Axial CT images of the abdomen and pelvis were obtained from the dome of the diaphragm to the proximal thighs.    COMPARISON:  MRI from 04/12/2024    FINDINGS:  Lower Thorax:    The visualized lung bases are centrally clear no pleural or pericardial effusion is seen.    CT Abdomen:    Liver: The liver is normal in size.  There is a somewhat heterogeneous background attenuation.  Ill-defined rounded areas of low-attenuation are seen in the liver.  The largest visualized hypoattenuating lesion in the left lobe of the liver measures 9 mm in diameter on this study (this was FDG avid on the previous PET-CT, and better seen on the diagnostic MRI).  After several attempts at remeasuring/marking, an  appropriate, safe and accessible window for a diagnostic tissue sample was not found, and the procedure was ended.    Gallbladder: The gallbladder is within normal limits.    Biliary Tree: No intra or extrahepatic ductal dilation.    Spleen: Normal.    Pancreas: The pancreas is normal.    Adrenal Glands: Normal    Kidneys: The kidneys are normal in imaging appearance without hydronephrosis or hydroureter.    Vasculature: Normal.    Lymph nodes: No abdominal lymphadenopathy is seen.    Intraperitoneal structures: There is no ascites.    Bowel: The visualized bowel is normal in course and caliber.    Abdominal wall: The abdominal wall and musculature are normal.    Musculoskeletal: Normal.      Impression    1.  No accessible window for biopsy of the small FDG avid lesion in the liver.    2.  Additional incidental findings as above.    RESULT NOTIFICATION: These observations were discussed by the dictating physician, in person with the patient at 9:27 on 4/15/2024.      Electronically signed by: Jose Angel Smith  Date:    04/15/2024  Time:    10:01         All lab results and imaging results have been reviewed and discussed with the patient.   Assessment/Plan:       1. Rectal adenocarcinoma metastatic to liver    2. Mouth sores      Rectal adenocarcinoma metastatic to liver    Mouth sores  -     duke's soln (benadryl 30 mL, mylanta 30 mL, LIDOcaine 30 mL, nystatin 30 mL) 120mL; Take 10 mLs by mouth 4 (four) times daily.  Dispense: 120 mL; Refill: 0       Cancer Staging   Rectal adenocarcinoma metastatic to liver  Staging form: Colon and Rectum, AJCC 8th Edition  - Clinical stage from 4/22/2024: Stage IRINEO (cN1a, cM1a) - Signed by Nii Rudd Jr., MD on 4/26/2024    PLAN:   Continue with Folfox and Avastin cycle 2 today   Did well with cycle 1 just noticed some cold sensitivity   Plan is to proceed with 6 cycles of treatment and then restage with scans and then potentially proceed with Chemo/radiation   Caris  revealed no relevant biomarkers   Patient is cleared for treatment today       RTC 2 weeks with me and 4 weeks with Dr. Samayoa       I have explained and the patient understands all of  the current recommendation(s). I have answered all of their questions to the best of my ability and to their complete satisfaction.   The patient is to continue with the current management plan.            Electronically signed by: Suzy Mittal MSN, APRN, AGNP-C

## 2024-05-22 ENCOUNTER — INFUSION (OUTPATIENT)
Dept: INFUSION THERAPY | Facility: HOSPITAL | Age: 44
End: 2024-05-22
Attending: INTERNAL MEDICINE
Payer: COMMERCIAL

## 2024-05-22 VITALS
DIASTOLIC BLOOD PRESSURE: 89 MMHG | BODY MASS INDEX: 28.43 KG/M2 | HEIGHT: 75 IN | OXYGEN SATURATION: 97 % | WEIGHT: 228.63 LBS | TEMPERATURE: 98 F | RESPIRATION RATE: 17 BRPM | HEART RATE: 62 BPM | SYSTOLIC BLOOD PRESSURE: 138 MMHG

## 2024-05-22 DIAGNOSIS — C78.7 METASTASIS TO LIVER: Primary | ICD-10-CM

## 2024-05-22 PROCEDURE — A4216 STERILE WATER/SALINE, 10 ML: HCPCS | Performed by: INTERNAL MEDICINE

## 2024-05-22 PROCEDURE — 25000003 PHARM REV CODE 250: Performed by: INTERNAL MEDICINE

## 2024-05-22 PROCEDURE — 96523 IRRIG DRUG DELIVERY DEVICE: CPT

## 2024-05-22 PROCEDURE — 63600175 PHARM REV CODE 636 W HCPCS: Performed by: INTERNAL MEDICINE

## 2024-05-22 RX ORDER — HEPARIN 100 UNIT/ML
500 SYRINGE INTRAVENOUS
Status: DISCONTINUED | OUTPATIENT
Start: 2024-05-22 | End: 2024-05-22 | Stop reason: HOSPADM

## 2024-05-22 RX ORDER — SODIUM CHLORIDE 0.9 % (FLUSH) 0.9 %
10 SYRINGE (ML) INJECTION
Status: DISCONTINUED | OUTPATIENT
Start: 2024-05-22 | End: 2024-05-22 | Stop reason: HOSPADM

## 2024-05-22 RX ADMIN — SODIUM CHLORIDE, PRESERVATIVE FREE 10 ML: 5 INJECTION INTRAVENOUS at 11:05

## 2024-05-22 RX ADMIN — HEPARIN 500 UNITS: 100 SYRINGE at 11:05

## 2024-05-22 NOTE — PLAN OF CARE
Problem: Infection  Goal: Absence of Infection Signs and Symptoms  Outcome: Progressing  Intervention: Prevent or Manage Infection  Flowsheets (Taken 5/22/2024 1138)  Infection Management: aseptic technique maintained  Isolation Precautions:   precautions initiated   precautions maintained

## 2024-06-02 RX ORDER — EPINEPHRINE 0.3 MG/.3ML
0.3 INJECTION SUBCUTANEOUS ONCE AS NEEDED
Status: CANCELLED | OUTPATIENT
Start: 2024-06-03

## 2024-06-02 RX ORDER — PROCHLORPERAZINE EDISYLATE 5 MG/ML
10 INJECTION INTRAMUSCULAR; INTRAVENOUS ONCE AS NEEDED
Status: CANCELLED | OUTPATIENT
Start: 2024-06-05

## 2024-06-02 RX ORDER — DIPHENHYDRAMINE HYDROCHLORIDE 50 MG/ML
50 INJECTION INTRAMUSCULAR; INTRAVENOUS ONCE AS NEEDED
Status: CANCELLED | OUTPATIENT
Start: 2024-06-03

## 2024-06-02 RX ORDER — FLUOROURACIL 50 MG/ML
400 INJECTION, SOLUTION INTRAVENOUS
Status: CANCELLED | OUTPATIENT
Start: 2024-06-03

## 2024-06-02 RX ORDER — HEPARIN 100 UNIT/ML
500 SYRINGE INTRAVENOUS
Status: CANCELLED | OUTPATIENT
Start: 2024-06-05

## 2024-06-02 RX ORDER — SODIUM CHLORIDE 0.9 % (FLUSH) 0.9 %
10 SYRINGE (ML) INJECTION
Status: CANCELLED | OUTPATIENT
Start: 2024-06-05

## 2024-06-02 RX ORDER — PROCHLORPERAZINE EDISYLATE 5 MG/ML
10 INJECTION INTRAMUSCULAR; INTRAVENOUS ONCE AS NEEDED
Status: CANCELLED | OUTPATIENT
Start: 2024-06-03

## 2024-06-02 RX ORDER — HEPARIN 100 UNIT/ML
500 SYRINGE INTRAVENOUS
Status: CANCELLED | OUTPATIENT
Start: 2024-06-03

## 2024-06-02 RX ORDER — SODIUM CHLORIDE 0.9 % (FLUSH) 0.9 %
10 SYRINGE (ML) INJECTION
Status: CANCELLED | OUTPATIENT
Start: 2024-06-03

## 2024-06-03 ENCOUNTER — INFUSION (OUTPATIENT)
Dept: INFUSION THERAPY | Facility: HOSPITAL | Age: 44
End: 2024-06-03
Attending: INTERNAL MEDICINE
Payer: COMMERCIAL

## 2024-06-03 ENCOUNTER — OFFICE VISIT (OUTPATIENT)
Facility: CLINIC | Age: 44
End: 2024-06-03
Payer: COMMERCIAL

## 2024-06-03 VITALS
TEMPERATURE: 97 F | WEIGHT: 229.88 LBS | HEART RATE: 70 BPM | HEIGHT: 75 IN | DIASTOLIC BLOOD PRESSURE: 98 MMHG | SYSTOLIC BLOOD PRESSURE: 148 MMHG | BODY MASS INDEX: 28.58 KG/M2 | RESPIRATION RATE: 18 BRPM

## 2024-06-03 VITALS
DIASTOLIC BLOOD PRESSURE: 96 MMHG | WEIGHT: 229.88 LBS | RESPIRATION RATE: 17 BRPM | HEART RATE: 80 BPM | TEMPERATURE: 98 F | HEIGHT: 75 IN | BODY MASS INDEX: 28.58 KG/M2 | SYSTOLIC BLOOD PRESSURE: 155 MMHG

## 2024-06-03 DIAGNOSIS — C20 RECTAL ADENOCARCINOMA METASTATIC TO LIVER: ICD-10-CM

## 2024-06-03 DIAGNOSIS — T45.1X5A CHEMOTHERAPY-INDUCED NEUROPATHY: ICD-10-CM

## 2024-06-03 DIAGNOSIS — G62.0 CHEMOTHERAPY-INDUCED NEUROPATHY: ICD-10-CM

## 2024-06-03 DIAGNOSIS — C78.7 RECTAL ADENOCARCINOMA METASTATIC TO LIVER: Primary | ICD-10-CM

## 2024-06-03 DIAGNOSIS — C78.7 RECTAL ADENOCARCINOMA METASTATIC TO LIVER: ICD-10-CM

## 2024-06-03 DIAGNOSIS — C78.7 METASTASIS TO LIVER: ICD-10-CM

## 2024-06-03 DIAGNOSIS — I10 HIGH BLOOD PRESSURE: Primary | ICD-10-CM

## 2024-06-03 DIAGNOSIS — C20 RECTAL ADENOCARCINOMA METASTATIC TO LIVER: Primary | ICD-10-CM

## 2024-06-03 DIAGNOSIS — R05.8 PRODUCTIVE COUGH: ICD-10-CM

## 2024-06-03 PROCEDURE — 25000003 PHARM REV CODE 250: Performed by: INTERNAL MEDICINE

## 2024-06-03 PROCEDURE — 1159F MED LIST DOCD IN RCRD: CPT | Mod: CPTII,S$GLB,, | Performed by: NURSE PRACTITIONER

## 2024-06-03 PROCEDURE — 63600175 PHARM REV CODE 636 W HCPCS: Performed by: INTERNAL MEDICINE

## 2024-06-03 PROCEDURE — 4010F ACE/ARB THERAPY RXD/TAKEN: CPT | Mod: CPTII,S$GLB,, | Performed by: NURSE PRACTITIONER

## 2024-06-03 PROCEDURE — 96411 CHEMO IV PUSH ADDL DRUG: CPT

## 2024-06-03 PROCEDURE — 96368 THER/DIAG CONCURRENT INF: CPT

## 2024-06-03 PROCEDURE — G2211 COMPLEX E/M VISIT ADD ON: HCPCS | Mod: S$GLB,,, | Performed by: NURSE PRACTITIONER

## 2024-06-03 PROCEDURE — 3077F SYST BP >= 140 MM HG: CPT | Mod: CPTII,S$GLB,, | Performed by: NURSE PRACTITIONER

## 2024-06-03 PROCEDURE — 96416 CHEMO PROLONG INFUSE W/PUMP: CPT

## 2024-06-03 PROCEDURE — 96415 CHEMO IV INFUSION ADDL HR: CPT

## 2024-06-03 PROCEDURE — 96417 CHEMO IV INFUS EACH ADDL SEQ: CPT

## 2024-06-03 PROCEDURE — 99215 OFFICE O/P EST HI 40 MIN: CPT | Mod: S$GLB,,, | Performed by: NURSE PRACTITIONER

## 2024-06-03 PROCEDURE — 3080F DIAST BP >= 90 MM HG: CPT | Mod: CPTII,S$GLB,, | Performed by: NURSE PRACTITIONER

## 2024-06-03 PROCEDURE — 96367 TX/PROPH/DG ADDL SEQ IV INF: CPT

## 2024-06-03 PROCEDURE — 99999 PR PBB SHADOW E&M-EST. PATIENT-LVL III: CPT | Mod: PBBFAC,,, | Performed by: NURSE PRACTITIONER

## 2024-06-03 PROCEDURE — 25000003 PHARM REV CODE 250: Performed by: NURSE PRACTITIONER

## 2024-06-03 PROCEDURE — 96413 CHEMO IV INFUSION 1 HR: CPT

## 2024-06-03 PROCEDURE — 3008F BODY MASS INDEX DOCD: CPT | Mod: CPTII,S$GLB,, | Performed by: NURSE PRACTITIONER

## 2024-06-03 RX ORDER — SODIUM CHLORIDE 0.9 % (FLUSH) 0.9 %
10 SYRINGE (ML) INJECTION
Status: DISCONTINUED | OUTPATIENT
Start: 2024-06-03 | End: 2024-06-03 | Stop reason: HOSPADM

## 2024-06-03 RX ORDER — DIPHENHYDRAMINE HYDROCHLORIDE 50 MG/ML
50 INJECTION INTRAMUSCULAR; INTRAVENOUS ONCE AS NEEDED
Status: DISCONTINUED | OUTPATIENT
Start: 2024-06-03 | End: 2024-06-03 | Stop reason: HOSPADM

## 2024-06-03 RX ORDER — CLONIDINE HYDROCHLORIDE 0.1 MG/1
0.1 TABLET ORAL ONCE
Status: COMPLETED | OUTPATIENT
Start: 2024-06-03 | End: 2024-06-03

## 2024-06-03 RX ORDER — AMOXICILLIN AND CLAVULANATE POTASSIUM 875; 125 MG/1; MG/1
1 TABLET, FILM COATED ORAL EVERY 12 HOURS
Qty: 10 TABLET | Refills: 0 | Status: SHIPPED | OUTPATIENT
Start: 2024-06-03

## 2024-06-03 RX ORDER — PROCHLORPERAZINE EDISYLATE 5 MG/ML
10 INJECTION INTRAMUSCULAR; INTRAVENOUS ONCE AS NEEDED
Status: DISCONTINUED | OUTPATIENT
Start: 2024-06-03 | End: 2024-06-03 | Stop reason: HOSPADM

## 2024-06-03 RX ORDER — FLUOROURACIL 50 MG/ML
400 INJECTION, SOLUTION INTRAVENOUS
Status: COMPLETED | OUTPATIENT
Start: 2024-06-03 | End: 2024-06-03

## 2024-06-03 RX ORDER — EPINEPHRINE 0.3 MG/.3ML
0.3 INJECTION SUBCUTANEOUS ONCE AS NEEDED
Status: DISCONTINUED | OUTPATIENT
Start: 2024-06-03 | End: 2024-06-03 | Stop reason: HOSPADM

## 2024-06-03 RX ADMIN — LEUCOVORIN CALCIUM 935 MG: 350 INJECTION, POWDER, LYOPHILIZED, FOR SOLUTION INTRAMUSCULAR; INTRAVENOUS at 12:06

## 2024-06-03 RX ADMIN — PALONOSETRON HYDROCHLORIDE 0.25 MG: 0.25 INJECTION INTRAVENOUS at 11:06

## 2024-06-03 RX ADMIN — BEVACIZUMAB-AWWB 515 MG: 400 INJECTION, SOLUTION INTRAVENOUS at 10:06

## 2024-06-03 RX ADMIN — CLONIDINE HYDROCHLORIDE 0.1 MG: 0.1 TABLET ORAL at 09:06

## 2024-06-03 RX ADMIN — FLUOROURACIL 935 MG: 50 INJECTION, SOLUTION INTRAVENOUS at 02:06

## 2024-06-03 RX ADMIN — FLUOROURACIL 5615 MG: 50 INJECTION, SOLUTION INTRAVENOUS at 02:06

## 2024-06-03 RX ADMIN — OXALIPLATIN 199 MG: 5 INJECTION, SOLUTION INTRAVENOUS at 12:06

## 2024-06-03 NOTE — PLAN OF CARE
Problem: Fatigue  Goal: Improved Activity Tolerance  6/3/2024 0929 by Anne Marie Melgoza, RN  Outcome: Met  6/3/2024 0929 by Anne Marie Melgoza, RN  Outcome: Progressing

## 2024-06-03 NOTE — PROGRESS NOTES
[facility] HEMATOLGY ONCOLOGY CONSULTATION    Subjective:       Patient ID: Yong Arenas is a 44 y.o. male returning today for a regularly scheduled follow-up visit.    Chief Complaint: Rectal Cancer with mets to liver       HPI  The patient is here today with his wife to discuss his treatment plan.   He is feeling great today.   Due for cycle 3 of Folfox and Avastin.     He does feel some cold sensitivity.   No nausea or vomiting.   No mouth sores   He does state that he has increased cough at times.     The plan after [facility] Tumor board is to do 6 cycles of Folfox and Avastin, then restage and hopefully move to chemo/radiation and re present at the board.     He has been getting labs done at [place].         Past Medical History:   Diagnosis Date    Back pain     Cancer     Cervical pain     GERD (gastroesophageal reflux disease)     HLD (hyperlipidemia)     Hypertension     Tinnitus, unspecified ear        Past Surgical History:   Procedure Laterality Date    EGD, WITH BALLOON DILATION OF LESS THAN 30 MILLIMETERS      INSERTION OF TUNNELED CENTRAL VENOUS CATHETER (CVC) WITH SUBCUTANEOUS PORT Left 2024    Procedure: INSERTION, PORT-A-CATH;  Surgeon: Tash Castellanos MD;  Location: [facility] OR;  Service: General;  Laterality: Left;  port placed to right chest , tunneled over to left chest, subclavian    WISDOM TOOTH EXTRACTION      x2       Social History     Socioeconomic History    Marital status:    Tobacco Use    Smoking status: Former     Types: Cigarettes     Start date:      Quit date:      Years since quittin.4    Smokeless tobacco: Never   Substance and Sexual Activity    Alcohol use: Not Currently     Comment: social    Drug use: Never       Family History   Problem Relation Name Age of Onset    Cancer Mother          liposarcoma on leg    Prostate cancer Father      Colon polyps Father      Leukemia Niece         Review of patient's allergies indicates:  No Known  Allergies    Current Outpatient Medications:     ibuprofen (ADVIL,MOTRIN) 200 MG tablet, Take 200 mg by mouth every 6 (six) hours as needed for Pain., Disp: , Rfl:     loratadine-pseudoephedrine 5-120 mg (CLARITIN-D 12 HOUR) 5-120 mg per tablet, Take by mouth once daily., Disp: , Rfl:     losartan (COZAAR) 100 MG tablet, Take 1 tablet by mouth once daily., Disp: , Rfl:     omeprazole (PRILOSEC) 40 MG capsule, Take 1 capsule by mouth daily as needed., Disp: , Rfl:     testosterone cypionate (DEPOTESTOTERONE CYPIONATE) 200 mg/mL injection, Inject 1 mL into the muscle every 14 (fourteen) days., Disp: , Rfl:     duke's soln (benadryl 30 mL, mylanta 30 mL, LIDOcaine 30 mL, nystatin 30 mL) 120mL, Take 10 mLs by mouth 4 (four) times daily. (Patient not taking: Reported on 6/3/2024), Disp: 120 mL, Rfl: 0    HYDROcodone-acetaminophen (NORCO) 5-325 mg per tablet, Take 1 tablet by mouth every 8 (eight) hours as needed for Pain. (Patient not taking: Reported on 4/22/2024), Disp: 15 tablet, Rfl: 0    ondansetron (ZOFRAN) 8 MG tablet, Take 1 tablet (8 mg total) by mouth every 8 (eight) hours as needed for Nausea. (Patient not taking: Reported on 4/22/2024), Disp: 30 tablet, Rfl: 2    promethazine (PHENERGAN) 25 MG tablet, Take 1 tablet (25 mg total) by mouth every 4 to 6 hours as needed for Nausea. (Patient not taking: Reported on 4/22/2024), Disp: 30 tablet, Rfl: 2    All medications and past history have been reviewed.    Review of Systems   Constitutional:  Negative for activity change, appetite change, fatigue and fever.   HENT:  Negative for congestion, mouth sores, postnasal drip, rhinorrhea, sinus pressure, sore throat and trouble swallowing.    Eyes:  Negative for photophobia and visual disturbance.   Respiratory:  Negative for cough, chest tightness, shortness of breath and wheezing.    Cardiovascular:  Negative for chest pain and leg swelling.   Gastrointestinal:  Positive for anal bleeding. Negative for abdominal  "distention, abdominal pain, constipation, diarrhea, nausea and vomiting.   Endocrine: Positive for cold intolerance.   Genitourinary:  Negative for decreased urine volume, dysuria and frequency.   Musculoskeletal:  Negative for arthralgias and myalgias.   Skin:  Negative for pallor and rash.   Allergic/Immunologic: Negative for immunocompromised state.   Neurological:  Positive for numbness. Negative for dizziness, syncope, weakness, light-headedness and headaches.   Hematological:  Negative for adenopathy. Does not bruise/bleed easily.   Psychiatric/Behavioral:  Negative for sleep disturbance. The patient is not nervous/anxious.        Objective:        BP (!) 155/96 (BP Location: Right arm)   Pulse 80   Temp 97.5 °F (36.4 °C)   Resp 17   Ht 6' 3" (1.905 m)   Wt 104.3 kg (229 lb 14.4 oz)   BMI 28.74 kg/m²     Physical Exam  Constitutional:       Appearance: Normal appearance.   HENT:      Head: Normocephalic and atraumatic.      Mouth/Throat:      Mouth: Mucous membranes are moist.   Cardiovascular:      Rate and Rhythm: Normal rate and regular rhythm.      Pulses: Normal pulses.      Heart sounds: Normal heart sounds.   Pulmonary:      Effort: Pulmonary effort is normal. No respiratory distress.      Breath sounds: Normal breath sounds. No wheezing.   Abdominal:      General: There is no distension.      Palpations: Abdomen is soft. There is no mass.      Tenderness: There is no abdominal tenderness.   Musculoskeletal:         General: No swelling. Normal range of motion.      Right lower leg: No edema.      Left lower leg: No edema.   Skin:     General: Skin is warm and dry.      Capillary Refill: Capillary refill takes 2 to 3 seconds.      Findings: No bruising or rash.   Neurological:      Mental Status: He is alert and oriented to person, place, and time. Mental status is at baseline.      Motor: No weakness.   Psychiatric:         Mood and Affect: Mood normal.         Behavior: Behavior normal.       "     Lab:                                    Radiology/Diagnostic Studies:  Results for orders placed or performed during the hospital encounter of 04/02/24 (from the past 2160 hour(s))   NM PET CT FDG Skull Base to Mid Thigh    Impression    Low rectal carcinoma with findings highly suspicious for metastatic disease to the liver.    Tiny left perirectal lymph node is suspicious based on morphology but too small to characterize with PET.  This may be assessed further with MRI.    Right lung nodule favored to correspond to benign intra fissural lymph node, but difficult to confirm intra fissural location on current CT images.  Follow-up with conventional chest CT would be helpful.      Electronically signed by: Sharon Durant  Date:    04/02/2024  Time:    10:21     Results for orders placed or performed during the hospital encounter of 04/15/24 (from the past 2160 hour(s))   CT Abdomen Without Contrast    Narrative    CMS MANDATED QUALITY DATA - CT RADIATION - 436    All CT scans at this facility utilize dose modulation, iterative reconstruction, and/or weight based dosing when appropriate to reduce radiation dose to as low as reasonably achievable.    CLINICAL HISTORY:  ([id number])45 y/o  (1980) M    Rectal cancer, staging; Malignant neoplasm of rectum    TECHNIQUE:  (A#[id number], exam time 4/15/2024 9:50)    CT ABDOMEN WITHOUT CONTRAST [id number]    Axial CT images of the abdomen and pelvis were obtained from the dome of the diaphragm to the proximal thighs.    COMPARISON:  MRI from 04/12/2024    FINDINGS:  Lower Thorax:    The visualized lung bases are centrally clear no pleural or pericardial effusion is seen.    CT Abdomen:    Liver: The liver is normal in size.  There is a somewhat heterogeneous background attenuation.  Ill-defined rounded areas of low-attenuation are seen in the liver.  The largest visualized hypoattenuating lesion in the left lobe of the liver measures 9 mm in diameter on this study  (this was FDG avid on the previous PET-CT, and better seen on the diagnostic MRI).  After several attempts at remeasuring/marking, an appropriate, safe and accessible window for a diagnostic tissue sample was not found, and the procedure was ended.    Gallbladder: The gallbladder is within normal limits.    Biliary Tree: No intra or extrahepatic ductal dilation.    Spleen: Normal.    Pancreas: The pancreas is normal.    Adrenal Glands: Normal    Kidneys: The kidneys are normal in imaging appearance without hydronephrosis or hydroureter.    Vasculature: Normal.    Lymph nodes: No abdominal lymphadenopathy is seen.    Intraperitoneal structures: There is no ascites.    Bowel: The visualized bowel is normal in course and caliber.    Abdominal wall: The abdominal wall and musculature are normal.    Musculoskeletal: Normal.      Impression    1.  No accessible window for biopsy of the small FDG avid lesion in the liver.    2.  Additional incidental findings as above.    RESULT NOTIFICATION: These observations were discussed by the dictating physician, in person with the patient at 9:27 on 4/15/2024.      Electronically signed by: Jose Angel Smith  Date:    04/15/2024  Time:    10:01         All lab results and imaging results have been reviewed and discussed with the patient.   Assessment/Plan:       1. Rectal adenocarcinoma metastatic to liver    2. Chemotherapy-induced neuropathy        Rectal adenocarcinoma metastatic to liver    Chemotherapy-induced neuropathy         Cancer Staging   Rectal adenocarcinoma metastatic to liver  Staging form: Colon and Rectum, AJCC 8th Edition  - Clinical stage from 4/22/2024: Stage IRINEO (cN1a, cM1a) - Signed by Nii Rudd Jr., MD on 4/26/2024    PLAN:   Continue with Folfox and Avastin cycle 3 today   Did well with cycle 2 just noticed some cold sensitivity and new neuropathy that is minor   Plan is to proceed with 6 cycles of treatment and then restage with scans and then  potentially proceed with Chemo/radiation   Caris revealed no relevant biomarkers   Patient is cleared for treatment today   Augmentin for productive cough        RTC 2 weeks with Dr. Samayoa and 4 cycles with me         I have explained and the patient understands all of  the current recommendation(s). I have answered all of their questions to the best of my ability and to their complete satisfaction.   The patient is to continue with the current management plan.            Electronically signed by: Suzy Falcon, APRN, AGNP-C

## 2024-06-05 ENCOUNTER — INFUSION (OUTPATIENT)
Dept: INFUSION THERAPY | Facility: HOSPITAL | Age: 44
End: 2024-06-05
Attending: INTERNAL MEDICINE
Payer: COMMERCIAL

## 2024-06-05 VITALS
WEIGHT: 227 LBS | DIASTOLIC BLOOD PRESSURE: 94 MMHG | BODY MASS INDEX: 28.23 KG/M2 | TEMPERATURE: 98 F | SYSTOLIC BLOOD PRESSURE: 146 MMHG | OXYGEN SATURATION: 98 % | HEART RATE: 85 BPM | HEIGHT: 75 IN

## 2024-06-05 DIAGNOSIS — C20 RECTAL ADENOCARCINOMA METASTATIC TO LIVER: ICD-10-CM

## 2024-06-05 DIAGNOSIS — C78.7 METASTASIS TO LIVER: Primary | ICD-10-CM

## 2024-06-05 DIAGNOSIS — C78.7 RECTAL ADENOCARCINOMA METASTATIC TO LIVER: ICD-10-CM

## 2024-06-05 PROCEDURE — 96523 IRRIG DRUG DELIVERY DEVICE: CPT

## 2024-06-05 PROCEDURE — A4216 STERILE WATER/SALINE, 10 ML: HCPCS | Performed by: INTERNAL MEDICINE

## 2024-06-05 PROCEDURE — 63600175 PHARM REV CODE 636 W HCPCS: Performed by: INTERNAL MEDICINE

## 2024-06-05 PROCEDURE — 25000003 PHARM REV CODE 250: Performed by: INTERNAL MEDICINE

## 2024-06-05 RX ORDER — HEPARIN 100 UNIT/ML
500 SYRINGE INTRAVENOUS
Status: DISCONTINUED | OUTPATIENT
Start: 2024-06-05 | End: 2024-06-05 | Stop reason: HOSPADM

## 2024-06-05 RX ORDER — SODIUM CHLORIDE 0.9 % (FLUSH) 0.9 %
10 SYRINGE (ML) INJECTION
Status: DISCONTINUED | OUTPATIENT
Start: 2024-06-05 | End: 2024-06-05 | Stop reason: HOSPADM

## 2024-06-05 RX ADMIN — HEPARIN 500 UNITS: 100 SYRINGE at 01:06

## 2024-06-05 RX ADMIN — SODIUM CHLORIDE, PRESERVATIVE FREE 10 ML: 5 INJECTION INTRAVENOUS at 01:06

## 2024-06-05 NOTE — PLAN OF CARE
Problem: Infection  Goal: Absence of Infection Signs and Symptoms  Outcome: Progressing  Intervention: Prevent or Manage Infection  Flowsheets (Taken 6/5/2024 1257)  Infection Management: aseptic technique maintained  Isolation Precautions:   precautions initiated   precautions maintained

## 2024-06-07 ENCOUNTER — TELEPHONE (OUTPATIENT)
Facility: CLINIC | Age: 44
End: 2024-06-07
Payer: COMMERCIAL

## 2024-06-07 NOTE — TELEPHONE ENCOUNTER
Called and spoke with spouse and informed her that we are still trying to get auth on the Neupogen. Advised that if it doesn't get approved today, then pt needs to go to the ER if he starts to experience fever greater than 100.4, or feels worse than what he has been experiencing lately (see earlier note from today). Advised that if it does get approved today I will be sending them to [facility]. If not, then they will be set up on Monday-Wednesday if insurance goes through. Spouse also informed us that she did buy a new thermometer.

## 2024-06-07 NOTE — TELEPHONE ENCOUNTER
Spoke with spouse and informed her that as of right now there is no auth for the Neupogen. They are aware to go to the ER for any fever or symptoms that get worse. See earlier notes. Advised that auth will hopefully be approved on Monday and that we still want to have pt get the 3 shots. Will follow up. Spouse verbalized understanding.

## 2024-06-07 NOTE — TELEPHONE ENCOUNTER
"KWAME Prasad from [place] called with critical WBC's 2.4 and ANC 1.000. Informed Suzy Argueta NP. Said to call patient and inform them of neutropenic precautions and if pt has a temp greater than 100.4 to go to the ER.    Called pts spouse and she said that pt has been feeling really bad since his last treatment. He said that his "insides hurt" like his kidneys, etc. He ended up getting 2 bags of IV fluids yesterday from a neighbor that does IV infusions. He has been extremely nauseated.  Said that his diastolic number has been in the high 90's and his PCP prescribed 2.5 mg Amlodipine daily but hasn't seen any change. Systolic has been in the 140's.   Asked about pts temp and she said that they don't have a thermometer but she will go get one. Said that the patient was complaining about how warm and sweaty he was. Advised that I would speak with Suzy Argueta NP and get back with her. She said to call her at work at .  "

## 2024-06-10 ENCOUNTER — TELEPHONE (OUTPATIENT)
Facility: CLINIC | Age: 44
End: 2024-06-10
Payer: COMMERCIAL

## 2024-06-10 ENCOUNTER — HOSPITAL ENCOUNTER (EMERGENCY)
Facility: HOSPITAL | Age: 44
Discharge: HOME | End: 2024-06-10
Attending: EMERGENCY MEDICINE
Payer: COMMERCIAL

## 2024-06-10 VITALS
DIASTOLIC BLOOD PRESSURE: 88 MMHG | TEMPERATURE: 98 F | OXYGEN SATURATION: 99 % | SYSTOLIC BLOOD PRESSURE: 150 MMHG | HEART RATE: 74 BPM | RESPIRATION RATE: 17 BRPM

## 2024-06-10 DIAGNOSIS — C20 RECTAL ADENOCARCINOMA METASTATIC TO LIVER: Primary | ICD-10-CM

## 2024-06-10 DIAGNOSIS — C78.7 RECTAL ADENOCARCINOMA METASTATIC TO LIVER: Primary | ICD-10-CM

## 2024-06-10 DIAGNOSIS — R51.9 NONINTRACTABLE HEADACHE, UNSPECIFIED CHRONICITY PATTERN, UNSPECIFIED HEADACHE TYPE: ICD-10-CM

## 2024-06-10 DIAGNOSIS — I10 HYPERTENSION, UNSPECIFIED TYPE: ICD-10-CM

## 2024-06-10 DIAGNOSIS — I10 HYPERTENSION: Primary | ICD-10-CM

## 2024-06-10 LAB
ALBUMIN SERPL BCP-MCNC: 4.2 G/DL (ref 3.5–5.2)
ALP SERPL-CCNC: 66 U/L (ref 55–135)
ALT SERPL W/O P-5'-P-CCNC: 38 U/L (ref 10–44)
ANION GAP SERPL CALC-SCNC: 9 MMOL/L (ref 8–16)
AST SERPL-CCNC: 20 U/L (ref 10–40)
BASOPHILS # BLD AUTO: 0.04 K/UL (ref 0–0.2)
BASOPHILS NFR BLD: 1.1 % (ref 0–1.9)
BILIRUB SERPL-MCNC: 0.4 MG/DL (ref 0.1–1)
BUN SERPL-MCNC: 11 MG/DL (ref 6–20)
CALCIUM SERPL-MCNC: 8.8 MG/DL (ref 8.7–10.5)
CHLORIDE SERPL-SCNC: 106 MMOL/L (ref 95–110)
CO2 SERPL-SCNC: 25 MMOL/L (ref 23–29)
CREAT SERPL-MCNC: 0.9 MG/DL (ref 0.5–1.4)
DIFFERENTIAL METHOD BLD: ABNORMAL
EOSINOPHIL # BLD AUTO: 0.2 K/UL (ref 0–0.5)
EOSINOPHIL NFR BLD: 5.3 % (ref 0–8)
ERYTHROCYTE [DISTWIDTH] IN BLOOD BY AUTOMATED COUNT: 14.1 % (ref 11.5–14.5)
EST. GFR  (NO RACE VARIABLE): >60 ML/MIN/1.73 M^2
GLUCOSE SERPL-MCNC: 111 MG/DL (ref 70–110)
HCT VFR BLD AUTO: 39.8 % (ref 40–54)
HGB BLD-MCNC: 13.8 G/DL (ref 14–18)
IMM GRANULOCYTES # BLD AUTO: 0.04 K/UL (ref 0–0.04)
IMM GRANULOCYTES NFR BLD AUTO: 1.1 % (ref 0–0.5)
LYMPHOCYTES # BLD AUTO: 1.7 K/UL (ref 1–4.8)
LYMPHOCYTES NFR BLD: 43.9 % (ref 18–48)
MCH RBC QN AUTO: 29.1 PG (ref 27–31)
MCHC RBC AUTO-ENTMCNC: 34.7 G/DL (ref 32–36)
MCV RBC AUTO: 84 FL (ref 82–98)
MONOCYTES # BLD AUTO: 0.3 K/UL (ref 0.3–1)
MONOCYTES NFR BLD: 8.7 % (ref 4–15)
NEUTROPHILS # BLD AUTO: 1.5 K/UL (ref 1.8–7.7)
NEUTROPHILS NFR BLD: 39.9 % (ref 38–73)
NRBC BLD-RTO: 0 /100 WBC
OHS QRS DURATION: 88 MS
OHS QTC CALCULATION: 392 MS
PLATELET # BLD AUTO: 206 K/UL (ref 150–450)
PMV BLD AUTO: 9.6 FL (ref 9.2–12.9)
POTASSIUM SERPL-SCNC: 3.7 MMOL/L (ref 3.5–5.1)
PROT SERPL-MCNC: 6.9 G/DL (ref 6–8.4)
RBC # BLD AUTO: 4.74 M/UL (ref 4.6–6.2)
SODIUM SERPL-SCNC: 140 MMOL/L (ref 136–145)
TROPONIN I SERPL HS-MCNC: 7.7 PG/ML (ref 0–14.9)
WBC # BLD AUTO: 3.8 K/UL (ref 3.9–12.7)

## 2024-06-10 PROCEDURE — 99285 EMERGENCY DEPT VISIT HI MDM: CPT | Mod: 25

## 2024-06-10 PROCEDURE — 93010 ELECTROCARDIOGRAM REPORT: CPT | Mod: ,,, | Performed by: GENERAL PRACTICE

## 2024-06-10 PROCEDURE — 84484 ASSAY OF TROPONIN QUANT: CPT | Performed by: EMERGENCY MEDICINE

## 2024-06-10 PROCEDURE — 25000003 PHARM REV CODE 250: Performed by: EMERGENCY MEDICINE

## 2024-06-10 PROCEDURE — 80053 COMPREHEN METABOLIC PANEL: CPT | Performed by: EMERGENCY MEDICINE

## 2024-06-10 PROCEDURE — 85025 COMPLETE CBC W/AUTO DIFF WBC: CPT | Performed by: EMERGENCY MEDICINE

## 2024-06-10 PROCEDURE — 93005 ELECTROCARDIOGRAM TRACING: CPT | Performed by: GENERAL PRACTICE

## 2024-06-10 PROCEDURE — 63600175 PHARM REV CODE 636 W HCPCS: Performed by: EMERGENCY MEDICINE

## 2024-06-10 PROCEDURE — 96361 HYDRATE IV INFUSION ADD-ON: CPT

## 2024-06-10 PROCEDURE — 96374 THER/PROPH/DIAG INJ IV PUSH: CPT

## 2024-06-10 RX ORDER — ONDANSETRON HYDROCHLORIDE 2 MG/ML
4 INJECTION, SOLUTION INTRAVENOUS
Status: COMPLETED | OUTPATIENT
Start: 2024-06-10 | End: 2024-06-10

## 2024-06-10 RX ORDER — BUTALBITAL, ACETAMINOPHEN AND CAFFEINE 50; 325; 40 MG/1; MG/1; MG/1
1 TABLET ORAL EVERY 4 HOURS PRN
Qty: 20 TABLET | Refills: 0 | Status: SHIPPED | OUTPATIENT
Start: 2024-06-10 | End: 2024-07-10

## 2024-06-10 RX ORDER — BUTALBITAL, ACETAMINOPHEN AND CAFFEINE 50; 325; 40 MG/1; MG/1; MG/1
2 TABLET ORAL
Status: COMPLETED | OUTPATIENT
Start: 2024-06-10 | End: 2024-06-10

## 2024-06-10 RX ORDER — HYDRALAZINE HYDROCHLORIDE 25 MG/1
25 TABLET, FILM COATED ORAL
Status: COMPLETED | OUTPATIENT
Start: 2024-06-10 | End: 2024-06-10

## 2024-06-10 RX ORDER — HYDRALAZINE HYDROCHLORIDE 25 MG/1
25 TABLET, FILM COATED ORAL EVERY 8 HOURS
Status: DISCONTINUED | OUTPATIENT
Start: 2024-06-10 | End: 2024-06-10

## 2024-06-10 RX ORDER — HYDRALAZINE HYDROCHLORIDE 25 MG/1
25 TABLET, FILM COATED ORAL 3 TIMES DAILY PRN
Qty: 30 TABLET | Refills: 0 | Status: SHIPPED | OUTPATIENT
Start: 2024-06-10 | End: 2025-06-10

## 2024-06-10 RX ADMIN — BUTALBITAL, ACETAMINOPHEN AND CAFFEINE 2 TABLET: 325; 50; 40 TABLET ORAL at 03:06

## 2024-06-10 RX ADMIN — SODIUM CHLORIDE 1000 ML: 9 INJECTION, SOLUTION INTRAVENOUS at 12:06

## 2024-06-10 RX ADMIN — ONDANSETRON 4 MG: 2 INJECTION INTRAMUSCULAR; INTRAVENOUS at 12:06

## 2024-06-10 RX ADMIN — HYDRALAZINE HYDROCHLORIDE 25 MG: 25 TABLET ORAL at 12:06

## 2024-06-10 NOTE — ED PROVIDER NOTES
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  Patient states his diastolic was 111 this morning.  He is compliant with his 100 mg of Cozaar.  Denies any chest pain, shortness of breath, leg swelling or neuro deficits.  Review of patient's allergies indicates:  No Known Allergies  Past Medical History:   Diagnosis Date    Back pain     Cancer     Cervical pain     GERD (gastroesophageal reflux disease)     HLD (hyperlipidemia)     Hypertension     Tinnitus, unspecified ear      Past Surgical History:   Procedure Laterality Date    EGD, WITH BALLOON DILATION OF LESS THAN 30 MILLIMETERS      INSERTION OF TUNNELED CENTRAL VENOUS CATHETER (CVC) WITH SUBCUTANEOUS PORT Left 2024    Procedure: INSERTION, PORT-A-CATH;  Surgeon: Tash Castellanos MD;  Location: [facility];  Service: General;  Laterality: Left;  port placed to right chest , tunneled over to left chest, subclavian    WISDOM TOOTH EXTRACTION      x2     Family History   Problem Relation Name Age of Onset    Cancer Mother          liposarcoma on leg    Prostate cancer Father      Colon polyps Father      Leukemia Niece       Social History     Tobacco Use    Smoking status: Former     Types: Cigarettes     Start date:      Quit date:      Years since quittin.4    Smokeless tobacco: Never   Substance Use Topics    Alcohol use: Not Currently     Comment: social    Drug use: Never     Review of Systems   Constitutional:  Positive for diaphoresis and fatigue. Negative for fever.   HENT:  Negative for sore throat.    Respiratory:   Negative for shortness of breath.    Cardiovascular:  Negative for chest pain.   Gastrointestinal:  Positive for nausea.   Genitourinary:  Negative for dysuria.   Musculoskeletal:  Negative for back pain.   Skin:  Negative for rash.   Neurological:  Positive for headaches. Negative for weakness.   Hematological:  Does not bruise/bleed easily.       Physical Exam     Initial Vitals [06/10/24 1035]   BP Pulse Resp Temp SpO2   (!) 162/105 84 20 97.7 °F (36.5 °C) 98 %      MAP       --         Physical Exam    Constitutional: Vital signs are normal. He appears well-developed and well-nourished.  Non-toxic appearance. No distress.   HENT:   Head: Normocephalic and atraumatic.   Eyes: EOM are normal. Pupils are equal, round, and reactive to light.   Neck: Neck supple. No JVD present.   Normal range of motion.  Cardiovascular:  Normal rate, regular rhythm, normal heart sounds and intact distal pulses.     Exam reveals no gallop and no friction rub.       No murmur heard.  Pulmonary/Chest: Breath sounds normal. He has no wheezes. He has no rhonchi. He has no rales.   Abdominal: Abdomen is soft. Bowel sounds are normal. There is no abdominal tenderness. There is no rebound and no guarding.   Musculoskeletal:         General: Normal range of motion.      Cervical back: Normal range of motion and neck supple. No rigidity.     Neurological: He is alert and oriented to person, place, and time. He has normal strength and normal reflexes. No cranial nerve deficit or sensory deficit. He exhibits normal muscle tone. Coordination normal. GCS score is 15. GCS eye subscore is 4. GCS verbal subscore is 5. GCS motor subscore is 6.   Skin: Skin is warm and dry.   Psychiatric: He has a normal mood and affect. His speech is normal and behavior is normal. He is not actively hallucinating.         ED Course   Procedures  Labs Reviewed   CBC W/ AUTO DIFFERENTIAL - Abnormal; Notable for the following components:       Result Value    WBC 3.80  (*)     Hemoglobin 13.8 (*)     Hematocrit 39.8 (*)     Immature Granulocytes 1.1 (*)     Gran # (ANC) 1.5 (*)     All other components within normal limits   COMPREHENSIVE METABOLIC PANEL - Abnormal; Notable for the following components:    Glucose 111 (*)     All other components within normal limits   TROPONIN I HIGH SENSITIVITY        ECG Results              EKG 12-lead (In process)        Collection Time Result Time QRS Duration OHS QTC Calculation    06/10/24 10:39:41 06/10/24 11:27:31 88 392                     In process by Interface, Lab In [facility] (06/10/24 11:27:37)                   Narrative:    Test Reason : I10,    Vent. Rate : 064 BPM     Atrial Rate : 064 BPM     P-R Int : 180 ms          QRS Dur : 088 ms      QT Int : 380 ms       P-R-T Axes : 070 065 062 degrees     QTc Int : 392 ms    Normal sinus rhythm  Normal ECG  When compared with ECG of 03-APR-2024 14:38,  No significant change was found    Referred By: [email or web address]   SELF           Confirmed By:                                   Imaging Results              CT Head Without Contrast (Final result)  Result time 06/10/24 15:18:24      Final result by Warren Jo MD (06/10/24 15:18:24)                   Impression:      1. Normal CT appearance of the brain.      Electronically signed by: Warren Jo  Date:    06/10/2024  Time:    15:18               Narrative:    EXAMINATION:  CT HEAD WITHOUT CONTRAST    CLINICAL HISTORY:  Headache, sudden, severe;.    COMPARISON:  None.    FINDINGS:  There is no evidence of intracranial mass, hemorrhage, or midline shift.  The ventricles and sulci are within normal limits.  There are no pathologic extra-axial fluid collections.    There is no evidence of ischemic change or edema.  Cerebellum and brainstem are unremarkable.    The calvarium is intact.  Mucous retention cysts or polyps are noted in both maxillary sinuses, the largest on the right measuring 3.2 cm.                                        X-Ray Chest 1 View (Final result)  Result time 06/10/24 11:38:04      Final result by Geronimo Walker MD (06/10/24 11:38:04)                   Impression:      No evidence of acute cardiopulmonary disease.      Electronically signed by: Geronimo Walker  Date:    06/10/2024  Time:    11:38               Narrative:    EXAMINATION:  XR CHEST 1 VIEW    CLINICAL HISTORY:  hypertension;    FINDINGS:  Portable chest radiograph at 11:19 hours compared to 04/09/2024 shows left subclavian injection port type central venous catheter, unchanged in position with distal tip overlying the SVC.  A short segment of the catheter just medial to the left 1st rib again shows marked luminal narrowing, unchanged.  The cardiomediastinal silhouette and pulmonary vasculature are within normal limits.    The lungs are normally expanded, with no consolidation, large pleural effusion, or evidence of pulmonary edema. No pneumothorax. There are no significant osseous abnormalities.                                       Medications   sodium chloride 0.9% bolus 1,000 mL 1,000 mL (0 mLs Intravenous Stopped 6/10/24 1350)   ondansetron injection 4 mg (4 mg Intravenous Given 6/10/24 1248)   hydrALAZINE tablet 25 mg (25 mg Oral Given 6/10/24 1248)   butalbital-acetaminophen-caffeine -40 mg per tablet 2 tablet (2 tablets Oral Given 6/10/24 1523)     Medical Decision Making  Patient is a very pleasant 44-year-old man with a history of adenocarcinoma of the rectum with Mets to the liver who presents emergency department for evaluation of nausea, fatigue, headache and elevated blood pressure.  He is on chemotherapy which 1 the side effects is increasing his blood pressure.  Patient also has underlying hypertension.  Differential diagnosis includes acute renal failure, intracranial hemorrhage, hypertensive emergency.  Patient has no evidence of acute end-organ damage.  His renal function is normal with a creatinine 0.9.  No signs of decompensated  congestive heart failure.  Neurological exam is normal with a normal CT of the head per my interpretation, no evidence of intracranial hemorrhage.  Cardiac workup is negative with a troponin of 7.7.  Blood cell count is 3.8 patient has known leukopenia from his chemo in his awaiting new less approval.  Blood pressures improved to 1 50/88 in the ED. was given 25 mg of hydralazine tablet.  He is to continue his antihypertensive regimen.  I have informed him to increase his amlodipine from 2.5-5 mg and I have given a prescription for hydralazine to take 3 times daily p.r.n. significantly elevated blood pressures greater than 190/100.  Patient to follow up closely with his oncologist and PCP.  Return precautions discussed.  Discharged in no acute distress.    Amount and/or Complexity of Data Reviewed  Radiology: ordered.    Risk  Prescription drug management.               ED Course as of 06/10/24 1915   Mon Talon 10, 2024   1157 EKG interpreted by myself as normal sinus rhythm, 64 beats per minute with normal ST segments, normal T-waves, normal intervals.  Normal EKG. [AS]      ED Course User Index  [AS] Gray Rodrigues MD                           Clinical Impression:  Final diagnoses:  [I10] Hypertension (Primary)  [R51.9] Nonintractable headache, unspecified chronicity pattern, unspecified headache type          ED Disposition Condition    Discharge Stable          ED Prescriptions       Medication Sig Dispense Start Date End Date Auth. Provider    butalbital-acetaminophen-caffeine -40 mg (FIORICET, ESGIC) -40 mg per tablet Take 1 tablet by mouth every 4 (four) hours as needed for Pain. 20 tablet 6/10/2024 7/10/2024 Gray Rodrigues MD    hydrALAZINE (APRESOLINE) 25 MG tablet Take 1 tablet (25 mg total) by mouth 3 (three) times daily as needed (Severely elevated blood pressure above 190/100). 30 tablet 6/10/2024 6/10/2025 Gray Rodrigues MD          Follow-up Information    None          Ravi  Gray MOSES MD  06/10/24 1915

## 2024-06-10 NOTE — ED NOTES
Presents to the ED d/t HTN. States he did take his BP medications today, but still reports it elevated. Denies blurred vision, but reports a headache and nausea. Reports recently having chemotherapy. NAD noted. Wife is at the bedside. AAOx4 with a GCS 15.

## 2024-06-10 NOTE — TELEPHONE ENCOUNTER
Patient's wife called in and reported that patient was experiencing headaches, dizziness, sweating with a blood pressure of 155/111. Patient on Avastin and currently waiting authorization on neupogen. Reviewed with RAMONE Cadet and per her verbal order, patient to head to the ER for evaluation. Wife verbalized understanding of above.

## 2024-06-10 NOTE — TELEPHONE ENCOUNTER
Patient's wife requested to know what the status of neupogen was. Reviewed with RAMONE Cadet and per her verbal order after new labs, neupogen no longer needed. Patient's wife made aware of above and verbalized understanding.

## 2024-06-11 ENCOUNTER — PATIENT MESSAGE (OUTPATIENT)
Facility: CLINIC | Age: 44
End: 2024-06-11
Payer: COMMERCIAL

## 2024-06-14 RX ORDER — FLUOROURACIL 50 MG/ML
400 INJECTION, SOLUTION INTRAVENOUS
Status: CANCELLED | OUTPATIENT
Start: 2024-06-17

## 2024-06-14 RX ORDER — HEPARIN 100 UNIT/ML
500 SYRINGE INTRAVENOUS
Status: CANCELLED | OUTPATIENT
Start: 2024-06-17

## 2024-06-14 RX ORDER — SODIUM CHLORIDE 0.9 % (FLUSH) 0.9 %
10 SYRINGE (ML) INJECTION
Status: CANCELLED | OUTPATIENT
Start: 2024-06-19

## 2024-06-14 RX ORDER — SODIUM CHLORIDE 0.9 % (FLUSH) 0.9 %
10 SYRINGE (ML) INJECTION
Status: CANCELLED | OUTPATIENT
Start: 2024-06-17

## 2024-06-14 RX ORDER — DIPHENHYDRAMINE HYDROCHLORIDE 50 MG/ML
50 INJECTION INTRAMUSCULAR; INTRAVENOUS ONCE AS NEEDED
Status: CANCELLED | OUTPATIENT
Start: 2024-06-17

## 2024-06-14 RX ORDER — PROCHLORPERAZINE EDISYLATE 5 MG/ML
10 INJECTION INTRAMUSCULAR; INTRAVENOUS ONCE AS NEEDED
Status: CANCELLED | OUTPATIENT
Start: 2024-06-17

## 2024-06-14 RX ORDER — PROCHLORPERAZINE EDISYLATE 5 MG/ML
10 INJECTION INTRAMUSCULAR; INTRAVENOUS ONCE AS NEEDED
Status: CANCELLED | OUTPATIENT
Start: 2024-06-19

## 2024-06-14 RX ORDER — HEPARIN 100 UNIT/ML
500 SYRINGE INTRAVENOUS
Status: CANCELLED | OUTPATIENT
Start: 2024-06-19

## 2024-06-14 RX ORDER — EPINEPHRINE 0.3 MG/.3ML
0.3 INJECTION SUBCUTANEOUS ONCE AS NEEDED
Status: CANCELLED | OUTPATIENT
Start: 2024-06-17

## 2024-06-17 ENCOUNTER — INFUSION (OUTPATIENT)
Dept: INFUSION THERAPY | Facility: HOSPITAL | Age: 44
End: 2024-06-17
Attending: INTERNAL MEDICINE
Payer: COMMERCIAL

## 2024-06-17 ENCOUNTER — OFFICE VISIT (OUTPATIENT)
Facility: CLINIC | Age: 44
End: 2024-06-17
Payer: COMMERCIAL

## 2024-06-17 ENCOUNTER — TELEPHONE (OUTPATIENT)
Facility: CLINIC | Age: 44
End: 2024-06-17

## 2024-06-17 VITALS
HEIGHT: 75 IN | RESPIRATION RATE: 18 BRPM | WEIGHT: 224.31 LBS | SYSTOLIC BLOOD PRESSURE: 138 MMHG | OXYGEN SATURATION: 98 % | BODY MASS INDEX: 27.89 KG/M2 | DIASTOLIC BLOOD PRESSURE: 83 MMHG | TEMPERATURE: 98 F | HEART RATE: 73 BPM

## 2024-06-17 VITALS
SYSTOLIC BLOOD PRESSURE: 146 MMHG | HEIGHT: 75 IN | TEMPERATURE: 98 F | BODY MASS INDEX: 28.1 KG/M2 | DIASTOLIC BLOOD PRESSURE: 80 MMHG | HEART RATE: 61 BPM | WEIGHT: 226 LBS | RESPIRATION RATE: 17 BRPM

## 2024-06-17 DIAGNOSIS — C78.7 RECTAL ADENOCARCINOMA METASTATIC TO LIVER: Primary | ICD-10-CM

## 2024-06-17 DIAGNOSIS — C20 RECTAL ADENOCARCINOMA METASTATIC TO LIVER: ICD-10-CM

## 2024-06-17 DIAGNOSIS — C78.7 METASTASIS TO LIVER: ICD-10-CM

## 2024-06-17 DIAGNOSIS — T45.1X5A CHEMOTHERAPY INDUCED NAUSEA AND VOMITING: ICD-10-CM

## 2024-06-17 DIAGNOSIS — C78.7 METASTASIS TO LIVER: Primary | ICD-10-CM

## 2024-06-17 DIAGNOSIS — R11.2 CHEMOTHERAPY INDUCED NAUSEA AND VOMITING: ICD-10-CM

## 2024-06-17 DIAGNOSIS — C78.7 RECTAL ADENOCARCINOMA METASTATIC TO LIVER: ICD-10-CM

## 2024-06-17 DIAGNOSIS — D70.1 CHEMOTHERAPY INDUCED NEUTROPENIA: ICD-10-CM

## 2024-06-17 DIAGNOSIS — C20 RECTAL ADENOCARCINOMA METASTATIC TO LIVER: Primary | ICD-10-CM

## 2024-06-17 DIAGNOSIS — T45.1X5A CHEMOTHERAPY INDUCED NEUTROPENIA: ICD-10-CM

## 2024-06-17 PROCEDURE — 96411 CHEMO IV PUSH ADDL DRUG: CPT

## 2024-06-17 PROCEDURE — 3008F BODY MASS INDEX DOCD: CPT | Mod: CPTII,S$GLB,, | Performed by: INTERNAL MEDICINE

## 2024-06-17 PROCEDURE — 99215 OFFICE O/P EST HI 40 MIN: CPT | Mod: S$GLB,,, | Performed by: INTERNAL MEDICINE

## 2024-06-17 PROCEDURE — G2211 COMPLEX E/M VISIT ADD ON: HCPCS | Mod: S$GLB,,, | Performed by: INTERNAL MEDICINE

## 2024-06-17 PROCEDURE — 96367 TX/PROPH/DG ADDL SEQ IV INF: CPT

## 2024-06-17 PROCEDURE — 4010F ACE/ARB THERAPY RXD/TAKEN: CPT | Mod: CPTII,S$GLB,, | Performed by: INTERNAL MEDICINE

## 2024-06-17 PROCEDURE — 25000003 PHARM REV CODE 250: Performed by: INTERNAL MEDICINE

## 2024-06-17 PROCEDURE — 63600175 PHARM REV CODE 636 W HCPCS: Performed by: INTERNAL MEDICINE

## 2024-06-17 PROCEDURE — 96415 CHEMO IV INFUSION ADDL HR: CPT

## 2024-06-17 PROCEDURE — 96368 THER/DIAG CONCURRENT INF: CPT

## 2024-06-17 PROCEDURE — 96413 CHEMO IV INFUSION 1 HR: CPT

## 2024-06-17 PROCEDURE — 96416 CHEMO PROLONG INFUSE W/PUMP: CPT

## 2024-06-17 PROCEDURE — 3079F DIAST BP 80-89 MM HG: CPT | Mod: CPTII,S$GLB,, | Performed by: INTERNAL MEDICINE

## 2024-06-17 PROCEDURE — 1159F MED LIST DOCD IN RCRD: CPT | Mod: CPTII,S$GLB,, | Performed by: INTERNAL MEDICINE

## 2024-06-17 PROCEDURE — 3077F SYST BP >= 140 MM HG: CPT | Mod: CPTII,S$GLB,, | Performed by: INTERNAL MEDICINE

## 2024-06-17 PROCEDURE — 99999 PR PBB SHADOW E&M-EST. PATIENT-LVL IV: CPT | Mod: PBBFAC,,, | Performed by: INTERNAL MEDICINE

## 2024-06-17 PROCEDURE — 96417 CHEMO IV INFUS EACH ADDL SEQ: CPT

## 2024-06-17 RX ORDER — EPINEPHRINE 0.3 MG/.3ML
0.3 INJECTION SUBCUTANEOUS ONCE AS NEEDED
Status: DISCONTINUED | OUTPATIENT
Start: 2024-06-17 | End: 2024-06-17 | Stop reason: HOSPADM

## 2024-06-17 RX ORDER — ONDANSETRON HYDROCHLORIDE 2 MG/ML
16 INJECTION, SOLUTION INTRAVENOUS DAILY
Status: SHIPPED | OUTPATIENT
Start: 2024-06-18 | End: 2024-06-22

## 2024-06-17 RX ORDER — PROMETHAZINE HYDROCHLORIDE 25 MG/ML
12.5 INJECTION, SOLUTION INTRAMUSCULAR; INTRAVENOUS EVERY 4 HOURS PRN
Status: DISCONTINUED | OUTPATIENT
Start: 2024-06-18 | End: 2024-06-17

## 2024-06-17 RX ORDER — AMLODIPINE BESYLATE 2.5 MG/1
1 TABLET ORAL DAILY
COMMUNITY
Start: 2024-06-04

## 2024-06-17 RX ORDER — SODIUM CHLORIDE 0.9 % (FLUSH) 0.9 %
10 SYRINGE (ML) INJECTION
Status: DISCONTINUED | OUTPATIENT
Start: 2024-06-17 | End: 2024-06-17 | Stop reason: HOSPADM

## 2024-06-17 RX ORDER — FLUOROURACIL 50 MG/ML
400 INJECTION, SOLUTION INTRAVENOUS
Status: COMPLETED | OUTPATIENT
Start: 2024-06-17 | End: 2024-06-17

## 2024-06-17 RX ORDER — DIPHENHYDRAMINE HYDROCHLORIDE 50 MG/ML
50 INJECTION INTRAMUSCULAR; INTRAVENOUS ONCE AS NEEDED
Status: DISCONTINUED | OUTPATIENT
Start: 2024-06-17 | End: 2024-06-17 | Stop reason: HOSPADM

## 2024-06-17 RX ORDER — PROCHLORPERAZINE EDISYLATE 5 MG/ML
10 INJECTION INTRAMUSCULAR; INTRAVENOUS ONCE AS NEEDED
Status: DISCONTINUED | OUTPATIENT
Start: 2024-06-17 | End: 2024-06-17 | Stop reason: HOSPADM

## 2024-06-17 RX ORDER — ONDANSETRON HYDROCHLORIDE 2 MG/ML
16 INJECTION, SOLUTION INTRAVENOUS DAILY
Status: DISCONTINUED | OUTPATIENT
Start: 2024-06-18 | End: 2024-06-17

## 2024-06-17 RX ADMIN — FLUOROURACIL 935 MG: 50 INJECTION, SOLUTION INTRAVENOUS at 01:06

## 2024-06-17 RX ADMIN — DEXTROSE MONOHYDRATE 935 MG: 50 INJECTION, SOLUTION INTRAVENOUS at 11:06

## 2024-06-17 RX ADMIN — PALONOSETRON HYDROCHLORIDE 0.25 MG: 0.25 INJECTION INTRAVENOUS at 10:06

## 2024-06-17 RX ADMIN — BEVACIZUMAB-AWWB 500 MG: 400 INJECTION, SOLUTION INTRAVENOUS at 09:06

## 2024-06-17 RX ADMIN — FLUOROURACIL 5615 MG: 50 INJECTION, SOLUTION INTRAVENOUS at 01:06

## 2024-06-17 RX ADMIN — OXALIPLATIN 199 MG: 5 INJECTION, SOLUTION INTRAVENOUS at 11:06

## 2024-06-17 NOTE — PLAN OF CARE
Problem: Fatigue  Goal: Improved Activity Tolerance  Outcome: Progressing  Intervention: Promote Improved Energy  Flowsheets (Taken 6/17/2024 6165)  Fatigue Management:   fatigue-related activity identified   paced activity encouraged   frequent rest breaks encouraged  Sleep/Rest Enhancement:   noise level reduced   relaxation techniques promoted   regular sleep/rest pattern promoted  Activity Management:   Ambulated -L4   Up in chair - L3  Environmental Support:   calm environment promoted   distractions minimized   rest periods encouraged

## 2024-06-18 NOTE — TELEPHONE ENCOUNTER
Pt had N/V after last course of chemo.    I have placed orders for IVF, Zofran daily x's 4 days,  6/18 through 6/21 under supportive care.    Get date and time    Get auth    Call him daily to see how he is doing with N/V, and does he need to come in for IVF, Zofran.?  6/18-6/21.    Also he had leukopenia 2400, neutropenia 1000.  Orders placed for zarxio daily 6/20, 6/21.  Get date and time  Get auth.

## 2024-06-18 NOTE — PROGRESS NOTES
[facility] [address] Hematology Oncology In Office Subsequent Encounter Note    6/17/24    Subjective:      Patient ID:   Yong Arenas  44 y.o. male  1980  Adan Hatch Albright, [place], Gray, MD's  [facility]      Chief Complaint:   Rectal cancer    HPI:  44 y.o. male has had rectal bleeding over the last 2-3 years.  BMs have become smaller.  He presented with symptoms of a prolapsed mass.  He had colonoscopy and was found to have a large mass at the rectum.  Biopsy returned moderately differentiated invasive adenocarcinoma arising from a tubulovillous adenoma.  MRI of the rectum showed this 3.8 cm polypoid lesion, probably the primary focus of malignancy.  There was thickening of the posterior and left lateral rectal wall.  Pelvic lymph node was up to 5 mm in size, pelvic lymphadenopathy was not seen.  The MRI scan was negative for significant local invasion.  PET scan showed low rectal carcinoma highly suspicious for metastatic disease to the liver tiny left pararectal lymph node is suspicious based on morphology but too small to characterize with PET.  A lesion is seen subcapsular in the dome of the liver with an SUV of 3.9.  Another lesion is seen in the left lobe of the liver at 1.5 cm and has an SUV of 4.3.    Will see if we can schedule an MRI of the liver for Wednesday, and see if the radiologist can biopsy 1 of these lesions for tissue diagnosis to confirm the presence of liver metastases.  At this point clinically it would appear to be stage IV disease.  Previously discussed with Dr. Beck.  For stage IV disease, would plan to go forward with FOLFIRI or FOLFOX with Avastin or Vectibix initially for 4-6 cycles, then evaluate for response, consider hepatic metastasectomy potentially.  MRI of liver may give us a more accurate number of liver metastases?    Radiation to the rectal mass and infusional 5 FU is deferred at this time until we get the liver MRI and biopsy if feasible and  clarify if we have stage IV disease here?    The Pet scan showed 2 metastasesin the liver.  The MRI supported 5 metastses in the liver, largest 9 mm.  CAT was done to try liver bx for tissue dx.  Radiologist was not able to position him for Bx of liver mass.  CEA 36 increased.  Next Generational Sequencing Studies, Caris, support FOLFOX AVASTIN is good regimen for Stage 4 dx.  Discussed Folfox Avastin,q 2 weeks.  Start 4/23/24.  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  Plan to hold Avastin for 1 month before any surgery, because of impaired wound healing with avastin.      Port was placed 4/9/24, site is NT and healed at R chest, catheter extending to L chest veins.    He sees the liver and rectal surgeon at [facility] 4/22/24.    Appetite is intact, weight is stable, he complains of easy fatigue.    Medications include Cozaar for hypertension, Prilosec for GERD, he did have his esophagus dilated approximately 4 years ago.  He does take testosterone injections q.2 weeks over the last 1-2 years per his primary physician Dr. Hatch.  He had 2 of 4 wisdom teeth removed in the past and a root canal x2.  He has not had any surgeries.      He smokes less than a half pack per day for 25 years, none x1 year.  He drinks beer, vodka, bourbon.  He denies allergies to medications.  He is a  and ulnar at [employer].  He does drink alkaline water.    He is traveling to [place] July 20th through July 24th.    His father had history of polyps, prostate cancer treated with radiation seeds.  His mother had cancer on her leg with swelling and was told that she had a liposarcoma.  He has a niece with history of leukemia who had stem cell transplant at age 22, she is 24 now.  He has 3 sisters alive and well and 2 sons alive and well age 23 and 21.    After last Rx, he had N/V several times on D2.  Seen in ER over that weekend with uncontrolled with  HTN.  WBC 2400, Segs 1000.  Support with IVF, Zofran, Phenergan.  Support with neupogen x's 2 days.  BP today 130/80.  CEA  37 to 49 to 61 to 31 to 14 to 10.    ROS:   GEN: normal without any fever, night sweats or weight loss  HEENT: normal with no HA's, sore throat, stiff neck, changes in vision  CV: normal with no CP, SOB, PND, PERRY or orthopnea  PULM: normal with no SOB, cough, hemoptysis, sputum or pleuritic pain  GI: See HPI  : normal with no hematuria, dysuria  BREAST: normal with no mass, discharge, pain  SKIN: normal with no rash, erythema, bruising, or swelling      Social History     Socioeconomic History    Marital status:    Tobacco Use    Smoking status: Former     Types: Cigarettes     Start date:      Quit date:      Years since quittin.4    Smokeless tobacco: Never   Substance and Sexual Activity    Alcohol use: Not Currently     Comment: social    Drug use: Never         Current Outpatient Medications:     amLODIPine (NORVASC) 2.5 MG tablet, Take 1 tablet by mouth once daily., Disp: , Rfl:     amoxicillin-clavulanate 875-125mg (AUGMENTIN) 875-125 mg per tablet, Take 1 tablet by mouth every 12 (twelve) hours., Disp: 10 tablet, Rfl: 0    butalbital-acetaminophen-caffeine -40 mg (FIORICET, ESGIC) -40 mg per tablet, Take 1 tablet by mouth every 4 (four) hours as needed for Pain., Disp: 20 tablet, Rfl: 0    hydrALAZINE (APRESOLINE) 25 MG tablet, Take 1 tablet (25 mg total) by mouth 3 (three) times daily as needed (Severely elevated blood pressure above 190/100)., Disp: 30 tablet, Rfl: 0    ibuprofen (ADVIL,MOTRIN) 200 MG tablet, Take 200 mg by mouth every 6 (six) hours as needed for Pain., Disp: , Rfl:     loratadine-pseudoephedrine 5-120 mg (CLARITIN-D 12 HOUR) 5-120 mg per tablet, Take by mouth once daily., Disp: , Rfl:     losartan (COZAAR) 100 MG tablet, Take 1 tablet by mouth once daily., Disp: , Rfl:     omeprazole (PRILOSEC) 40 MG capsule, Take 1 capsule by mouth  "daily as needed., Disp: , Rfl:     testosterone cypionate (DEPOTESTOTERONE CYPIONATE) 200 mg/mL injection, Inject 1 mL into the muscle every 14 (fourteen) days., Disp: , Rfl:     duke's soln (benadryl 30 mL, mylanta 30 mL, LIDOcaine 30 mL, nystatin 30 mL) 120mL, Take 10 mLs by mouth 4 (four) times daily. (Patient not taking: Reported on 6/3/2024), Disp: 120 mL, Rfl: 0    HYDROcodone-acetaminophen (NORCO) 5-325 mg per tablet, Take 1 tablet by mouth every 8 (eight) hours as needed for Pain. (Patient not taking: Reported on 4/22/2024), Disp: 15 tablet, Rfl: 0    ondansetron (ZOFRAN) 8 MG tablet, Take 1 tablet (8 mg total) by mouth every 8 (eight) hours as needed for Nausea. (Patient not taking: Reported on 4/22/2024), Disp: 30 tablet, Rfl: 2    promethazine (PHENERGAN) 25 MG tablet, Take 1 tablet (25 mg total) by mouth every 4 to 6 hours as needed for Nausea. (Patient not taking: Reported on 4/22/2024), Disp: 30 tablet, Rfl: 2  No current facility-administered medications for this visit.          Objective:   Vitals:  Blood pressure (!) 146/80, pulse 61, temperature 97.9 °F (36.6 °C), resp. rate 17, height 6' 3" (1.905 m), weight 102.5 kg (226 lb).    Physical Examination:   GEN: no apparent distress, comfortable  HEAD: atraumatic and normocephalic  EYES: no pallor, no icteru  ENT: no pharyngeal erythema, external ears WNL; no nasal discharge  NECK: no masses, thyroid normal, trachea midline, no LAD/LN's, supple  CV: RRR with no murmur; normal pulse; normal S1 and S2; no pedal edema  CHEST: Normal respiratory effort; CTAB; normal breath sounds; no wheeze or crackles  ABDOM: nontender and nondistended; soft; no rebound/guarding, L/S NP   MUSC/Skeletal: ROM normal; no crepitus; joints normal   EXTREM: no clubbing, cyanosis, inflammation or swelling  SKIN: no rashes, lesions, ulcers, petechiae   : no cvat  NEURO: grossly intact; motor/sensory WNL;  no tremors  PSYCH: normal mood, affect and behavior  LYMPH: normal " cervical, supraclavicular, axillary and groin LN's  BREASTS: L & R no palpable mass    Colonoscopy report from March 18, 2024 revealed a large frond like villous fungating mass at the rectum, 5 cm in length, incomplete resection was done, the lesion was just above the anus.  A small polyp was removed from the cecum and 3 small polyps were removed from the sigmoid colon follow-up colonoscopy is due in 1 year.      Pathology report from [facility] pathology dated March 19, 2024   [id number].  Small polyps were tubular adenoma and hyperplastic polyps.  The rectal lesion was moderately differentiated invasive adenocarcinoma arising from tubulovillous adenoma with extensive high-grade dysplasia.    PET scan and MRI scan of the pelvis have been ordered at [facility] imaging.  Assessment:   (1) 44 y.o. 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    (2) staging studies will include PET scan to check for metastatic disease, and to evaluate for lymphadenopathy in the pelvis.  MRI scan of the pelvis is being done to evaluate the local extent of the primary tumor in the pelvis.  CBC, CMP, CEA, ferritin, B12, and folate have been ordered.    (3) staging of the rectal cancer is in progress with the PET scan and MRI scan and will be determined clinically after the results of the studies are confirmed.    (4) treatment options can include low anterior resection and abdominal peritoneal resection, radiation will also be involved for local control, and chemotherapy will be given as a radiation sensitizer and also to try and decrease risk of systemic recurrence adjuvantly or katy adjuvantly.  Next generation sequencing studies to determine a role for immune oncology will be submitted.    (5) in the past surgery was usually done initially, followed by adjuvant radiation therapy and chemotherapy concurrently for local control, followed by systemic  chemotherapy x6 months adjuvantly to reduce systemic recurrence.    In the last several years, our  treatment approach has changed, with Neoadjuvant radiation therapy and concurrent chemotherapy infusion being given initially, followed by Neoadjuvant  systemic chemotherapy q.2 weeks times 6-12 cycles, followed by surgical re-evaluation at a later date to include low anterior resection, abdominal peritoneal resection, or observation.    He has an appointment to see Dr. Brandon Wolff, the surgical oncologist, at [facility].  Dr. Arellano of radiation therapy.    Folfox Avastin D1C1 4/23/24, see discussion in HPI above.  D1C4 today.  IVF, zofran, phenergan D 2 and D 3.  Neupogen Th, Fri.  Check MRI, Pet 6/26, 6/27.

## 2024-06-19 ENCOUNTER — INFUSION (OUTPATIENT)
Dept: INFUSION THERAPY | Facility: HOSPITAL | Age: 44
End: 2024-06-19
Attending: INTERNAL MEDICINE
Payer: COMMERCIAL

## 2024-06-19 VITALS
SYSTOLIC BLOOD PRESSURE: 135 MMHG | BODY MASS INDEX: 27.65 KG/M2 | WEIGHT: 222.38 LBS | TEMPERATURE: 98 F | HEIGHT: 75 IN | HEART RATE: 72 BPM | DIASTOLIC BLOOD PRESSURE: 84 MMHG | RESPIRATION RATE: 18 BRPM | OXYGEN SATURATION: 97 %

## 2024-06-19 DIAGNOSIS — T45.1X5A CHEMOTHERAPY INDUCED NAUSEA AND VOMITING: ICD-10-CM

## 2024-06-19 DIAGNOSIS — C78.7 METASTASIS TO LIVER: Primary | ICD-10-CM

## 2024-06-19 DIAGNOSIS — R11.2 CHEMOTHERAPY INDUCED NAUSEA AND VOMITING: ICD-10-CM

## 2024-06-19 PROCEDURE — 96366 THER/PROPH/DIAG IV INF ADDON: CPT

## 2024-06-19 PROCEDURE — A4216 STERILE WATER/SALINE, 10 ML: HCPCS | Performed by: INTERNAL MEDICINE

## 2024-06-19 PROCEDURE — 25000003 PHARM REV CODE 250: Performed by: INTERNAL MEDICINE

## 2024-06-19 PROCEDURE — 96365 THER/PROPH/DIAG IV INF INIT: CPT

## 2024-06-19 PROCEDURE — 63600175 PHARM REV CODE 636 W HCPCS: Performed by: INTERNAL MEDICINE

## 2024-06-19 RX ORDER — SODIUM CHLORIDE 0.9 % (FLUSH) 0.9 %
10 SYRINGE (ML) INJECTION
Status: CANCELLED | OUTPATIENT
Start: 2024-06-19

## 2024-06-19 RX ORDER — HEPARIN 100 UNIT/ML
500 SYRINGE INTRAVENOUS
Status: CANCELLED | OUTPATIENT
Start: 2024-06-19

## 2024-06-19 RX ORDER — SODIUM CHLORIDE 0.9 % (FLUSH) 0.9 %
10 SYRINGE (ML) INJECTION
Status: DISCONTINUED | OUTPATIENT
Start: 2024-06-19 | End: 2024-06-19 | Stop reason: HOSPADM

## 2024-06-19 RX ORDER — HEPARIN 100 UNIT/ML
500 SYRINGE INTRAVENOUS
Status: DISCONTINUED | OUTPATIENT
Start: 2024-06-19 | End: 2024-06-19 | Stop reason: HOSPADM

## 2024-06-19 RX ADMIN — HEPARIN 500 UNITS: 100 SYRINGE at 01:06

## 2024-06-19 RX ADMIN — POTASSIUM CHLORIDE 250 ML/HR: 2 INJECTION, SOLUTION, CONCENTRATE INTRAVENOUS at 11:06

## 2024-06-19 RX ADMIN — SODIUM CHLORIDE, PRESERVATIVE FREE 10 ML: 5 INJECTION INTRAVENOUS at 01:06

## 2024-06-20 ENCOUNTER — TELEPHONE (OUTPATIENT)
Dept: INFUSION THERAPY | Facility: HOSPITAL | Age: 44
End: 2024-06-20

## 2024-06-20 NOTE — TELEPHONE ENCOUNTER
LM for patient to call back regarding his white blood cell injection that was scheduled for today at 1pm.

## 2024-06-21 ENCOUNTER — DOCUMENTATION ONLY (OUTPATIENT)
Facility: CLINIC | Age: 44
End: 2024-06-21
Payer: COMMERCIAL

## 2024-06-21 ENCOUNTER — INFUSION (OUTPATIENT)
Dept: INFUSION THERAPY | Facility: HOSPITAL | Age: 44
End: 2024-06-21
Attending: INTERNAL MEDICINE
Payer: COMMERCIAL

## 2024-06-21 VITALS
SYSTOLIC BLOOD PRESSURE: 144 MMHG | TEMPERATURE: 98 F | HEIGHT: 75 IN | WEIGHT: 218 LBS | OXYGEN SATURATION: 98 % | DIASTOLIC BLOOD PRESSURE: 99 MMHG | RESPIRATION RATE: 18 BRPM | HEART RATE: 79 BPM | BODY MASS INDEX: 27.1 KG/M2

## 2024-06-21 DIAGNOSIS — D70.1 CHEMOTHERAPY INDUCED NEUTROPENIA: Primary | ICD-10-CM

## 2024-06-21 DIAGNOSIS — C78.7 METASTASIS TO LIVER: ICD-10-CM

## 2024-06-21 DIAGNOSIS — C78.7 RECTAL ADENOCARCINOMA METASTATIC TO LIVER: ICD-10-CM

## 2024-06-21 DIAGNOSIS — C20 RECTAL ADENOCARCINOMA METASTATIC TO LIVER: ICD-10-CM

## 2024-06-21 DIAGNOSIS — R11.2 CHEMOTHERAPY INDUCED NAUSEA AND VOMITING: ICD-10-CM

## 2024-06-21 DIAGNOSIS — T45.1X5A CHEMOTHERAPY INDUCED NEUTROPENIA: Primary | ICD-10-CM

## 2024-06-21 DIAGNOSIS — T45.1X5A CHEMOTHERAPY INDUCED NAUSEA AND VOMITING: ICD-10-CM

## 2024-06-21 PROCEDURE — 63600175 PHARM REV CODE 636 W HCPCS: Mod: JZ,JB,JG | Performed by: INTERNAL MEDICINE

## 2024-06-21 PROCEDURE — 63600175 PHARM REV CODE 636 W HCPCS: Performed by: NURSE PRACTITIONER

## 2024-06-21 PROCEDURE — 25000003 PHARM REV CODE 250: Performed by: NURSE PRACTITIONER

## 2024-06-21 PROCEDURE — A4216 STERILE WATER/SALINE, 10 ML: HCPCS | Performed by: NURSE PRACTITIONER

## 2024-06-21 RX ORDER — HEPARIN 100 UNIT/ML
500 SYRINGE INTRAVENOUS
Status: CANCELLED | OUTPATIENT
Start: 2024-06-21

## 2024-06-21 RX ORDER — HEPARIN 100 UNIT/ML
500 SYRINGE INTRAVENOUS
Status: DISCONTINUED | OUTPATIENT
Start: 2024-06-21 | End: 2024-06-21 | Stop reason: HOSPADM

## 2024-06-21 RX ORDER — SODIUM CHLORIDE 0.9 % (FLUSH) 0.9 %
10 SYRINGE (ML) INJECTION
Status: DISCONTINUED | OUTPATIENT
Start: 2024-06-21 | End: 2024-06-21 | Stop reason: HOSPADM

## 2024-06-21 RX ORDER — SODIUM CHLORIDE 0.9 % (FLUSH) 0.9 %
10 SYRINGE (ML) INJECTION
Status: CANCELLED | OUTPATIENT
Start: 2024-06-21

## 2024-06-21 RX ADMIN — SODIUM CHLORIDE, PRESERVATIVE FREE 10 ML: 5 INJECTION INTRAVENOUS at 04:06

## 2024-06-21 RX ADMIN — HEPARIN 500 UNITS: 100 SYRINGE at 04:06

## 2024-06-21 RX ADMIN — POTASSIUM CHLORIDE: 2 INJECTION, SOLUTION, CONCENTRATE INTRAVENOUS at 02:06

## 2024-06-21 RX ADMIN — FILGRASTIM-SNDZ 480 MCG: 480 INJECTION, SOLUTION INTRAVENOUS; SUBCUTANEOUS at 02:06

## 2024-06-24 ENCOUNTER — INFUSION (OUTPATIENT)
Dept: INFUSION THERAPY | Facility: HOSPITAL | Age: 44
End: 2024-06-24
Attending: INTERNAL MEDICINE
Payer: COMMERCIAL

## 2024-06-24 VITALS
BODY MASS INDEX: 27.37 KG/M2 | DIASTOLIC BLOOD PRESSURE: 87 MMHG | HEIGHT: 75 IN | HEART RATE: 81 BPM | OXYGEN SATURATION: 99 % | SYSTOLIC BLOOD PRESSURE: 145 MMHG | WEIGHT: 220.13 LBS | TEMPERATURE: 98 F | RESPIRATION RATE: 18 BRPM

## 2024-06-24 DIAGNOSIS — C78.7 RECTAL ADENOCARCINOMA METASTATIC TO LIVER: ICD-10-CM

## 2024-06-24 DIAGNOSIS — C20 RECTAL ADENOCARCINOMA METASTATIC TO LIVER: ICD-10-CM

## 2024-06-24 DIAGNOSIS — C78.7 METASTASIS TO LIVER: ICD-10-CM

## 2024-06-24 DIAGNOSIS — T45.1X5A CHEMOTHERAPY INDUCED NEUTROPENIA: Primary | ICD-10-CM

## 2024-06-24 DIAGNOSIS — D70.1 CHEMOTHERAPY INDUCED NEUTROPENIA: Primary | ICD-10-CM

## 2024-06-24 PROCEDURE — 63600175 PHARM REV CODE 636 W HCPCS: Mod: JZ,JB,JG | Performed by: INTERNAL MEDICINE

## 2024-06-24 PROCEDURE — 96372 THER/PROPH/DIAG INJ SC/IM: CPT

## 2024-06-24 RX ADMIN — FILGRASTIM-SNDZ 480 MCG: 480 INJECTION, SOLUTION INTRAVENOUS; SUBCUTANEOUS at 09:06

## 2024-06-26 ENCOUNTER — HOSPITAL ENCOUNTER (OUTPATIENT)
Dept: RADIOLOGY | Facility: HOSPITAL | Age: 44
Discharge: HOME | End: 2024-06-26
Attending: INTERNAL MEDICINE
Payer: COMMERCIAL

## 2024-06-26 DIAGNOSIS — C78.7 METASTASIS TO LIVER: ICD-10-CM

## 2024-06-26 DIAGNOSIS — C20 RECTAL ADENOCARCINOMA METASTATIC TO LIVER: ICD-10-CM

## 2024-06-26 DIAGNOSIS — C78.7 RECTAL ADENOCARCINOMA METASTATIC TO LIVER: ICD-10-CM

## 2024-06-26 LAB — GLUCOSE SERPL-MCNC: 94 MG/DL (ref 70–110)

## 2024-06-26 PROCEDURE — A9552 F18 FDG: HCPCS | Mod: PN | Performed by: INTERNAL MEDICINE

## 2024-06-26 PROCEDURE — 78815 PET IMAGE W/CT SKULL-THIGH: CPT | Mod: 26,PS,, | Performed by: RADIOLOGY

## 2024-06-26 PROCEDURE — 78815 PET IMAGE W/CT SKULL-THIGH: CPT | Mod: TC,PS,PN

## 2024-06-26 RX ORDER — FLUDEOXYGLUCOSE F18 500 MCI/ML
12.6 INJECTION INTRAVENOUS
Status: COMPLETED | OUTPATIENT
Start: 2024-06-26 | End: 2024-06-26

## 2024-06-26 RX ADMIN — FLUDEOXYGLUCOSE F-18 12.6 MILLICURIE: 500 INJECTION INTRAVENOUS at 08:06

## 2024-06-26 NOTE — PROGRESS NOTES
PET Imaging Questionnaire    Are you a Diabetic? Recent Blood Sugar level? No    Are you anemic? Bone Marrow Stimulation Meds? No    Have you had a CT Scan, if so when & where was your last one? Yes -     Have you had a PET Scan, if so when & where was your last one? Yes -     Chemotherapy or currently on Chemotherapy? No    Radiation therapy? No    Surgical History:   Past Surgical History:   Procedure Laterality Date    EGD, WITH BALLOON DILATION OF LESS THAN 30 MILLIMETERS  2020    INSERTION OF TUNNELED CENTRAL VENOUS CATHETER (CVC) WITH SUBCUTANEOUS PORT Left 4/9/2024    Procedure: INSERTION, PORT-A-CATH;  Surgeon: Tash Castellanos MD;  Location: [facility];  Service: General;  Laterality: Left;  port placed to right chest , tunneled over to left chest, subclavian    WISDOM TOOTH EXTRACTION      x2        Have you been fasting for at least 6 hours? Yes    Is there any chance you may be pregnant or breastfeeding? No    Assay: 13.8 MCi@:08:03   Injection Site:LT AC    Residual: 1.2 mCi@: 08:05   Technologist: Rahul Berry Injected:12.6 mCi

## 2024-06-27 ENCOUNTER — HOSPITAL ENCOUNTER (OUTPATIENT)
Dept: RADIOLOGY | Facility: HOSPITAL | Age: 44
Discharge: HOME | End: 2024-06-27
Attending: INTERNAL MEDICINE
Payer: COMMERCIAL

## 2024-06-27 DIAGNOSIS — C78.7 METASTASIS TO LIVER: ICD-10-CM

## 2024-06-27 DIAGNOSIS — C20 RECTAL ADENOCARCINOMA METASTATIC TO LIVER: ICD-10-CM

## 2024-06-27 DIAGNOSIS — C78.7 RECTAL ADENOCARCINOMA METASTATIC TO LIVER: ICD-10-CM

## 2024-06-27 DIAGNOSIS — C20 RECTAL CANCER METASTASIZED TO LIVER: ICD-10-CM

## 2024-06-27 DIAGNOSIS — C78.7 RECTAL CANCER METASTASIZED TO LIVER: ICD-10-CM

## 2024-06-27 PROCEDURE — 72197 MRI PELVIS W/O & W/DYE: CPT | Mod: 26,,, | Performed by: STUDENT IN AN ORGANIZED HEALTH CARE EDUCATION/TRAINING PROGRAM

## 2024-06-27 PROCEDURE — 72197 MRI PELVIS W/O & W/DYE: CPT | Mod: TC

## 2024-06-27 PROCEDURE — 74183 MRI ABD W/O CNTR FLWD CNTR: CPT | Mod: 26,,, | Performed by: RADIOLOGY

## 2024-06-27 PROCEDURE — 25500020 PHARM REV CODE 255

## 2024-06-27 PROCEDURE — 74183 MRI ABD W/O CNTR FLWD CNTR: CPT | Mod: TC

## 2024-06-27 PROCEDURE — A9585 GADOBUTROL INJECTION: HCPCS

## 2024-06-27 RX ORDER — SODIUM CHLORIDE 0.9 % (FLUSH) 0.9 %
10 SYRINGE (ML) INJECTION
OUTPATIENT
Start: 2024-07-01

## 2024-06-27 RX ORDER — FLUOROURACIL 50 MG/ML
400 INJECTION, SOLUTION INTRAVENOUS
OUTPATIENT
Start: 2024-07-01

## 2024-06-27 RX ORDER — PROCHLORPERAZINE EDISYLATE 5 MG/ML
10 INJECTION INTRAMUSCULAR; INTRAVENOUS ONCE AS NEEDED
OUTPATIENT
Start: 2024-07-01

## 2024-06-27 RX ORDER — HEPARIN 100 UNIT/ML
500 SYRINGE INTRAVENOUS
OUTPATIENT
Start: 2024-07-01

## 2024-06-27 RX ORDER — EPINEPHRINE 0.3 MG/.3ML
0.3 INJECTION SUBCUTANEOUS ONCE AS NEEDED
OUTPATIENT
Start: 2024-07-01

## 2024-06-27 RX ORDER — GADOBUTROL 604.72 MG/ML
INJECTION INTRAVENOUS
Status: COMPLETED
Start: 2024-06-27 | End: 2024-06-27

## 2024-06-27 RX ORDER — DIPHENHYDRAMINE HYDROCHLORIDE 50 MG/ML
50 INJECTION INTRAMUSCULAR; INTRAVENOUS ONCE AS NEEDED
OUTPATIENT
Start: 2024-07-01

## 2024-06-27 RX ADMIN — GADOBUTROL 10 ML: 604.72 INJECTION INTRAVENOUS at 07:06

## 2024-06-28 ENCOUNTER — TELEPHONE (OUTPATIENT)
Dept: SURGERY | Facility: CLINIC | Age: 44
End: 2024-06-28
Payer: COMMERCIAL

## 2024-07-01 ENCOUNTER — INFUSION (OUTPATIENT)
Dept: INFUSION THERAPY | Facility: HOSPITAL | Age: 44
End: 2024-07-01
Attending: INTERNAL MEDICINE
Payer: COMMERCIAL

## 2024-07-01 ENCOUNTER — OFFICE VISIT (OUTPATIENT)
Facility: CLINIC | Age: 44
End: 2024-07-01
Payer: COMMERCIAL

## 2024-07-01 VITALS
TEMPERATURE: 97 F | DIASTOLIC BLOOD PRESSURE: 86 MMHG | WEIGHT: 225.31 LBS | HEART RATE: 87 BPM | SYSTOLIC BLOOD PRESSURE: 143 MMHG | BODY MASS INDEX: 28.16 KG/M2 | RESPIRATION RATE: 10 BRPM

## 2024-07-01 VITALS
HEIGHT: 75 IN | RESPIRATION RATE: 18 BRPM | BODY MASS INDEX: 28.08 KG/M2 | OXYGEN SATURATION: 97 % | WEIGHT: 225.81 LBS | HEART RATE: 72 BPM | TEMPERATURE: 98 F | DIASTOLIC BLOOD PRESSURE: 94 MMHG | SYSTOLIC BLOOD PRESSURE: 149 MMHG

## 2024-07-01 DIAGNOSIS — C78.7 METASTASIS TO LIVER: Primary | ICD-10-CM

## 2024-07-01 DIAGNOSIS — T45.1X5A CHEMOTHERAPY INDUCED NEUTROPENIA: ICD-10-CM

## 2024-07-01 DIAGNOSIS — C20 RECTAL ADENOCARCINOMA METASTATIC TO LIVER: Primary | ICD-10-CM

## 2024-07-01 DIAGNOSIS — D70.1 CHEMOTHERAPY INDUCED NEUTROPENIA: ICD-10-CM

## 2024-07-01 DIAGNOSIS — C78.7 RECTAL ADENOCARCINOMA METASTATIC TO LIVER: Primary | ICD-10-CM

## 2024-07-01 PROCEDURE — 96416 CHEMO PROLONG INFUSE W/PUMP: CPT

## 2024-07-01 PROCEDURE — 3008F BODY MASS INDEX DOCD: CPT | Mod: CPTII,S$GLB,, | Performed by: NURSE PRACTITIONER

## 2024-07-01 PROCEDURE — 99999 PR PBB SHADOW E&M-EST. PATIENT-LVL III: CPT | Mod: PBBFAC,,, | Performed by: NURSE PRACTITIONER

## 2024-07-01 PROCEDURE — 25000003 PHARM REV CODE 250: Performed by: NURSE PRACTITIONER

## 2024-07-01 PROCEDURE — 99215 OFFICE O/P EST HI 40 MIN: CPT | Mod: S$GLB,,, | Performed by: NURSE PRACTITIONER

## 2024-07-01 PROCEDURE — 96411 CHEMO IV PUSH ADDL DRUG: CPT

## 2024-07-01 PROCEDURE — 3079F DIAST BP 80-89 MM HG: CPT | Mod: CPTII,S$GLB,, | Performed by: NURSE PRACTITIONER

## 2024-07-01 PROCEDURE — G2211 COMPLEX E/M VISIT ADD ON: HCPCS | Mod: S$GLB,,, | Performed by: NURSE PRACTITIONER

## 2024-07-01 PROCEDURE — 96368 THER/DIAG CONCURRENT INF: CPT

## 2024-07-01 PROCEDURE — 1159F MED LIST DOCD IN RCRD: CPT | Mod: CPTII,S$GLB,, | Performed by: NURSE PRACTITIONER

## 2024-07-01 PROCEDURE — 63600175 PHARM REV CODE 636 W HCPCS: Performed by: NURSE PRACTITIONER

## 2024-07-01 PROCEDURE — 3077F SYST BP >= 140 MM HG: CPT | Mod: CPTII,S$GLB,, | Performed by: NURSE PRACTITIONER

## 2024-07-01 PROCEDURE — 96415 CHEMO IV INFUSION ADDL HR: CPT

## 2024-07-01 PROCEDURE — 96367 TX/PROPH/DG ADDL SEQ IV INF: CPT

## 2024-07-01 PROCEDURE — 4010F ACE/ARB THERAPY RXD/TAKEN: CPT | Mod: CPTII,S$GLB,, | Performed by: NURSE PRACTITIONER

## 2024-07-01 PROCEDURE — 96413 CHEMO IV INFUSION 1 HR: CPT

## 2024-07-01 PROCEDURE — 96417 CHEMO IV INFUS EACH ADDL SEQ: CPT

## 2024-07-01 RX ORDER — EPINEPHRINE 0.3 MG/.3ML
0.3 INJECTION SUBCUTANEOUS ONCE AS NEEDED
Status: DISCONTINUED | OUTPATIENT
Start: 2024-07-01 | End: 2024-07-01 | Stop reason: HOSPADM

## 2024-07-01 RX ORDER — SODIUM CHLORIDE 0.9 % (FLUSH) 0.9 %
10 SYRINGE (ML) INJECTION
Status: DISCONTINUED | OUTPATIENT
Start: 2024-07-01 | End: 2024-07-01 | Stop reason: HOSPADM

## 2024-07-01 RX ORDER — FLUOROURACIL 50 MG/ML
400 INJECTION, SOLUTION INTRAVENOUS
Status: COMPLETED | OUTPATIENT
Start: 2024-07-01 | End: 2024-07-01

## 2024-07-01 RX ORDER — DIPHENHYDRAMINE HYDROCHLORIDE 50 MG/ML
50 INJECTION INTRAMUSCULAR; INTRAVENOUS ONCE AS NEEDED
Status: DISCONTINUED | OUTPATIENT
Start: 2024-07-01 | End: 2024-07-01 | Stop reason: HOSPADM

## 2024-07-01 RX ADMIN — DEXTROSE MONOHYDRATE: 50 INJECTION, SOLUTION INTRAVENOUS at 11:07

## 2024-07-01 RX ADMIN — BEVACIZUMAB-AWWB 500 MG: 400 INJECTION, SOLUTION INTRAVENOUS at 09:07

## 2024-07-01 RX ADMIN — SODIUM CHLORIDE 5615 MG: 9 INJECTION, SOLUTION INTRAVENOUS at 01:07

## 2024-07-01 RX ADMIN — DEXTROSE MONOHYDRATE 935 MG: 50 INJECTION, SOLUTION INTRAVENOUS at 11:07

## 2024-07-01 RX ADMIN — OXALIPLATIN 199 MG: 5 INJECTION, SOLUTION INTRAVENOUS at 11:07

## 2024-07-01 RX ADMIN — FLUOROURACIL 935 MG: 50 INJECTION, SOLUTION INTRAVENOUS at 01:07

## 2024-07-01 RX ADMIN — PALONOSETRON HYDROCHLORIDE 0.25 MG: 0.25 INJECTION INTRAVENOUS at 11:07

## 2024-07-01 NOTE — PROGRESS NOTES
[facility] HEMATOLGY ONCOLOGY CONSULTATION    Subjective:       Patient ID: Yong Arenas is a 44 y.o. male returning today for a regularly scheduled follow-up visit.    Chief Complaint: Rectal Cancer with liver mets.       HPI  Patient is here today with his wife. He is due for cycle 5 of chemotherapy today.   He did have neutropenia with the last few cycles and required zarxio injections.   He is feeling okay today.     Scans done on  and    He has an appt on  to review scans with Dr. Rudd.       Past Medical History:   Diagnosis Date    Back pain     Cancer     Cervical pain     GERD (gastroesophageal reflux disease)     HLD (hyperlipidemia)     Hypertension     Tinnitus, unspecified ear        Past Surgical History:   Procedure Laterality Date    EGD, WITH BALLOON DILATION OF LESS THAN 30 MILLIMETERS      INSERTION OF TUNNELED CENTRAL VENOUS CATHETER (CVC) WITH SUBCUTANEOUS PORT Left 2024    Procedure: INSERTION, PORT-A-CATH;  Surgeon: Tash Castellanos MD;  Location: [facility] OR;  Service: General;  Laterality: Left;  port placed to right chest , tunneled over to left chest, subclavian    WISDOM TOOTH EXTRACTION      x2       Social History     Socioeconomic History    Marital status:    Tobacco Use    Smoking status: Former     Types: Cigarettes     Start date:      Quit date:      Years since quittin.5    Smokeless tobacco: Never   Substance and Sexual Activity    Alcohol use: Not Currently     Comment: social    Drug use: Never       Family History   Problem Relation Name Age of Onset    Cancer Mother          liposarcoma on leg    Prostate cancer Father      Colon polyps Father      Leukemia Niece         Review of patient's allergies indicates:  No Known Allergies    Current Outpatient Medications:     amLODIPine (NORVASC) 2.5 MG tablet, Take 1 tablet by mouth once daily., Disp: , Rfl:     hydrALAZINE (APRESOLINE) 25 MG tablet, Take 1 tablet (25 mg total) by mouth 3  (three) times daily as needed (Severely elevated blood pressure above 190/100)., Disp: 30 tablet, Rfl: 0    losartan (COZAAR) 100 MG tablet, Take 1 tablet by mouth once daily., Disp: , Rfl:     omeprazole (PRILOSEC) 40 MG capsule, Take 1 capsule by mouth daily as needed., Disp: , Rfl:     amoxicillin-clavulanate 875-125mg (AUGMENTIN) 875-125 mg per tablet, Take 1 tablet by mouth every 12 (twelve) hours. (Patient not taking: Reported on 7/1/2024), Disp: 10 tablet, Rfl: 0    butalbital-acetaminophen-caffeine -40 mg (FIORICET, ESGIC) -40 mg per tablet, Take 1 tablet by mouth every 4 (four) hours as needed for Pain. (Patient not taking: Reported on 7/1/2024), Disp: 20 tablet, Rfl: 0    duke's soln (benadryl 30 mL, mylanta 30 mL, LIDOcaine 30 mL, nystatin 30 mL) 120mL, Take 10 mLs by mouth 4 (four) times daily. (Patient not taking: Reported on 6/3/2024), Disp: 120 mL, Rfl: 0    HYDROcodone-acetaminophen (NORCO) 5-325 mg per tablet, Take 1 tablet by mouth every 8 (eight) hours as needed for Pain. (Patient not taking: Reported on 4/22/2024), Disp: 15 tablet, Rfl: 0    ibuprofen (ADVIL,MOTRIN) 200 MG tablet, Take 200 mg by mouth every 6 (six) hours as needed for Pain. (Patient not taking: Reported on 7/1/2024), Disp: , Rfl:     loratadine-pseudoephedrine 5-120 mg (CLARITIN-D 12 HOUR) 5-120 mg per tablet, Take by mouth once daily. (Patient not taking: Reported on 7/1/2024), Disp: , Rfl:     ondansetron (ZOFRAN) 8 MG tablet, Take 1 tablet (8 mg total) by mouth every 8 (eight) hours as needed for Nausea. (Patient not taking: Reported on 4/22/2024), Disp: 30 tablet, Rfl: 2    promethazine (PHENERGAN) 25 MG tablet, Take 1 tablet (25 mg total) by mouth every 4 to 6 hours as needed for Nausea. (Patient not taking: Reported on 4/22/2024), Disp: 30 tablet, Rfl: 2    testosterone cypionate (DEPOTESTOTERONE CYPIONATE) 200 mg/mL injection, Inject 1 mL into the muscle every 14 (fourteen) days. (Patient not taking: Reported  on 7/1/2024), Disp: , Rfl:   No current facility-administered medications for this visit.    Facility-Administered Medications Ordered in Other Visits:     0.9% NaCl 250 mL flush bag, , Intravenous, 1 time in [facility], NP    bevacizumab-awwb (MVASI) 500 mg in 0.9% NaCl 100 mL infusion, 500 mg, Intravenous, [facility], Last Rate: 66.7 mL/hr at 07/01/24 0947, 500 mg at 07/01/24 0947    D5W 250 mL flush bag, , Intravenous, 1 time in [facility], Suzy Lofton NP    diphenhydrAMINE injection 50 mg, 50 mg, Intravenous, Once PRN, Suzy Lofton NP    EPINEPHrine (EPIPEN) 0.3 mg/0.3 mL pen injection 0.3 mg, 0.3 mg, Intramuscular, Once PRN, Suzy Lofton NP    fluorouracil (Adrucil) 2,400 mg/m2 = 5,615 mg in 0.9% NaCl 250 mL chemo infusion, 2,400 mg/m2 (Treatment Plan Recorded), Intravenous, 1 time [facility], [facility]    fluorouraciL injection 935 mg, 400 mg/m2 (Treatment Plan Recorded), Intravenous, 1 [facility]    hydrocortisone sodium succinate injection 100 mg, 100 mg, Intravenous, Once PRN, Suzy Lofton NP    leucovorin calcium 400 mg/m2 = 935 mg in D5W 296.8 mL infusion, 400 mg/m2 (Treatment Plan Recorded), Intravenous, 1 time [facility], [facility]    oxaliplatin (ELOXATIN) 85 mg/m2 = 199 mg in D5W 604.8 mL chemo infusion, 85 mg/m2 (Treatment Plan Recorded), Intravenous, 1 [facility], [facility]    palonosetron 0.25mg/dexAMETHasone 12mg in NS IVPB 0.25 mg 50 mL, 0.25 mg, Intravenous, 1 time in [facility], Suzy Lofton NP    sodium chloride 0.9% flush 10 mL, 10 mL, Intravenous, PRN, Suzy Lofton, ROMA    All medications and past history have been reviewed.    Review of Systems   Constitutional:  Positive for fatigue. Negative for activity change, appetite change  and fever.   HENT:  Negative for congestion, mouth sores, postnasal drip, rhinorrhea, sinus pressure, sore throat and trouble swallowing.    Eyes:  Negative for photophobia and visual disturbance.   Respiratory:  Negative for cough, chest tightness, shortness of breath and wheezing.    Cardiovascular:  Negative for chest pain and leg swelling.   Gastrointestinal:  Negative for abdominal distention, abdominal pain, constipation, diarrhea, nausea and vomiting.   Endocrine: Negative for cold intolerance.   Genitourinary:  Negative for decreased urine volume, dysuria and frequency.   Musculoskeletal:  Negative for arthralgias and myalgias.   Skin:  Negative for pallor and rash.   Allergic/Immunologic: Negative for immunocompromised state.   Neurological:  Negative for dizziness, syncope, weakness, light-headedness, numbness and headaches.   Hematological:  Negative for adenopathy. Does not bruise/bleed easily.   Psychiatric/Behavioral:  Negative for sleep disturbance. The patient is not nervous/anxious.        Objective:        BP (!) 143/86   Pulse 87   Temp 97.1 °F (36.2 °C) (Temporal)   Resp 10   Wt 102.2 kg (225 lb 4.8 oz)   BMI 28.16 kg/m²     Physical Exam  Constitutional:       Appearance: Normal appearance.   HENT:      Head: Normocephalic and atraumatic.      Mouth/Throat:      Mouth: Mucous membranes are moist.   Cardiovascular:      Rate and Rhythm: Normal rate and regular rhythm.      Pulses: Normal pulses.      Heart sounds: Normal heart sounds.   Pulmonary:      Effort: Pulmonary effort is normal. No respiratory distress.      Breath sounds: Normal breath sounds. No wheezing.   Abdominal:      General: There is no distension.      Palpations: Abdomen is soft. There is no mass.      Tenderness: There is no abdominal tenderness.   Musculoskeletal:         General: No swelling. Normal range of motion.      Right lower leg: No edema.      Left lower leg: No edema.   Skin:     General: Skin is warm and  dry.      Capillary Refill: Capillary refill takes 2 to 3 seconds.      Findings: No bruising or rash.   Neurological:      Mental Status: He is alert and oriented to person, place, and time. Mental status is at baseline.      Motor: No weakness.   Psychiatric:         Mood and Affect: Mood normal.         Behavior: Behavior normal.           Lab:                                                  No results found for this or any previous visit (from the past 336 hour(s)).  CMP  Sodium   Date Value Ref Range Status   06/10/2024 140 136 - 145 mmol/L Final     Potassium   Date Value Ref Range Status   06/10/2024 3.7 3.5 - 5.1 mmol/L Final     Chloride   Date Value Ref Range Status   06/10/2024 106 95 - 110 mmol/L Final     CO2   Date Value Ref Range Status   06/10/2024 25 23 - 29 mmol/L Final     Glucose   Date Value Ref Range Status   06/10/2024 111 (H) 70 - 110 mg/dL Final     BUN   Date Value Ref Range Status   06/10/2024 11 6 - 20 mg/dL Final     Creatinine   Date Value Ref Range Status   06/10/2024 0.9 0.5 - 1.4 mg/dL Final     Calcium   Date Value Ref Range Status   06/10/2024 8.8 8.7 - 10.5 mg/dL Final     Total Protein   Date Value Ref Range Status   06/10/2024 6.9 6.0 - 8.4 g/dL Final     Albumin   Date Value Ref Range Status   06/10/2024 4.2 3.5 - 5.2 g/dL Final     Total Bilirubin   Date Value Ref Range Status   06/10/2024 0.4 0.1 - 1.0 mg/dL Final     Comment:     For infants and newborns, interpretation of results should be based  on gestational age, weight and in agreement with clinical  observations.    Premature Infant recommended reference ranges:  Up to 24 hours.............<8.0 mg/dL  Up to 48 hours............<12.0 mg/dL  3-5 days..................<15.0 mg/dL  6-29 days.................<15.0 mg/dL       Alkaline Phosphatase   Date Value Ref Range Status   06/10/2024 66 55 - 135 U/L Final     AST   Date Value Ref Range Status   06/10/2024 20 10 - 40 U/L Final     ALT   Date Value Ref Range Status    06/10/2024 38 10 - 44 U/L Final     Anion Gap   Date Value Ref Range Status   06/10/2024 9 8 - 16 mmol/L Final                   Radiology/Diagnostic Studies:        Results for orders placed or performed during the hospital encounter of 06/26/24 (from the past 2160 hour(s))   NM PET CT FDG Skull Base to Mid Thigh    Impression    1. Resolution of 2 previously FDG avid foci in the liver suggestive of positive therapy response.  2. Slight decreased soft tissue thickening in the known rectal lesion compared to the prior study.      Electronically signed by: Sreekanth Park MD  Date:    06/26/2024  Time:    09:41     Results for orders placed or performed during the hospital encounter of 06/10/24 (from the past 2160 hour(s))   CT Head Without Contrast    Narrative    EXAMINATION:  CT HEAD WITHOUT CONTRAST    CLINICAL HISTORY:  Headache, sudden, severe;.    COMPARISON:  None.    FINDINGS:  There is no evidence of intracranial mass, hemorrhage, or midline shift.  The ventricles and sulci are within normal limits.  There are no pathologic extra-axial fluid collections.    There is no evidence of ischemic change or edema.  Cerebellum and brainstem are unremarkable.    The calvarium is intact.  Mucous retention cysts or polyps are noted in both maxillary sinuses, the largest on the right measuring 3.2 cm.      Impression    1. Normal CT appearance of the brain.      Electronically signed by: Warren Jo  Date:    06/10/2024  Time:    15:18   Results for orders placed or performed during the hospital encounter of 04/15/24 (from the past 2160 hour(s))   CT Abdomen Without Contrast    Narrative    CMS MANDATED QUALITY DATA - CT RADIATION - 436    All CT scans at this facility utilize dose modulation, iterative reconstruction, and/or weight based dosing when appropriate to reduce radiation dose to as low as reasonably achievable.    CLINICAL HISTORY:  ([id number] y/o  (1980) M    Rectal cancer, staging; Malignant  neoplasm of rectum    TECHNIQUE:  (A#[id number], exam time 4/15/2024 9:50)    CT ABDOMEN WITHOUT CONTRAST [id number]    Axial CT images of the abdomen and pelvis were obtained from the dome of the diaphragm to the proximal thighs.    COMPARISON:  MRI from 04/12/2024    FINDINGS:  Lower Thorax:    The visualized lung bases are centrally clear no pleural or pericardial effusion is seen.    CT Abdomen:    Liver: The liver is normal in size.  There is a somewhat heterogeneous background attenuation.  Ill-defined rounded areas of low-attenuation are seen in the liver.  The largest visualized hypoattenuating lesion in the left lobe of the liver measures 9 mm in diameter on this study (this was FDG avid on the previous PET-CT, and better seen on the diagnostic MRI).  After several attempts at remeasuring/marking, an appropriate, safe and accessible window for a diagnostic tissue sample was not found, and the procedure was ended.    Gallbladder: The gallbladder is within normal limits.    Biliary Tree: No intra or extrahepatic ductal dilation.    Spleen: Normal.    Pancreas: The pancreas is normal.    Adrenal Glands: Normal    Kidneys: The kidneys are normal in imaging appearance without hydronephrosis or hydroureter.    Vasculature: Normal.    Lymph nodes: No abdominal lymphadenopathy is seen.    Intraperitoneal structures: There is no ascites.    Bowel: The visualized bowel is normal in course and caliber.    Abdominal wall: The abdominal wall and musculature are normal.    Musculoskeletal: Normal.      Impression    1.  No accessible window for biopsy of the small FDG avid lesion in the liver.    2.  Additional incidental findings as above.    RESULT NOTIFICATION: These observations were discussed by the dictating physician, in person with the patient at 9:27 on 4/15/2024.      Electronically signed by: Jose Angel Smith  Date:    04/15/2024  Time:    10:01         All lab results and imaging results have been reviewed and  discussed with the patient.   Assessment/Plan:       1. Rectal adenocarcinoma metastatic to liver    2. Chemotherapy induced neutropenia      Rectal adenocarcinoma metastatic to liver    Chemotherapy induced neutropenia        PLAN:   Scans reviewed   Labs reviewed   Added onpro due to patient not being able to make multiple return visits for zarxio  Proceed with cycle 5 today   Sees Dr. Rudd next week      Cancer Staging   Rectal adenocarcinoma metastatic to liver  Staging form: Colon and Rectum, AJCC 8th Edition  - Clinical stage from 4/22/2024: Stage IRINEO (cN1a, cM1a) - Signed by Nii Rudd Jr., MD on 4/26/2024        Follow up in about 2 weeks (around 7/15/2024) for with Dr. Calabrese and 4 weeks with me .     I have explained and the patient understands all of  the current recommendation(s). I have answered all of their questions to the best of my ability and to their complete satisfaction.   The patient is to continue with the current management plan.            Electronically signed by: Suzy Funes, MSN, APRN, AGNP-C

## 2024-07-02 ENCOUNTER — PATIENT MESSAGE (OUTPATIENT)
Facility: CLINIC | Age: 44
End: 2024-07-02
Payer: COMMERCIAL

## 2024-07-02 DIAGNOSIS — E86.0 DEHYDRATION: Primary | ICD-10-CM

## 2024-07-02 RX ORDER — PROCHLORPERAZINE EDISYLATE 5 MG/ML
10 INJECTION INTRAMUSCULAR; INTRAVENOUS ONCE AS NEEDED
Status: CANCELLED | OUTPATIENT
Start: 2024-07-03

## 2024-07-02 RX ORDER — SODIUM CHLORIDE 0.9 % (FLUSH) 0.9 %
10 SYRINGE (ML) INJECTION
Status: CANCELLED | OUTPATIENT
Start: 2024-07-03

## 2024-07-02 RX ORDER — HEPARIN 100 UNIT/ML
500 SYRINGE INTRAVENOUS
Status: CANCELLED | OUTPATIENT
Start: 2024-07-03

## 2024-07-03 ENCOUNTER — INFUSION (OUTPATIENT)
Dept: INFUSION THERAPY | Facility: HOSPITAL | Age: 44
End: 2024-07-03
Attending: INTERNAL MEDICINE
Payer: COMMERCIAL

## 2024-07-03 VITALS
TEMPERATURE: 98 F | DIASTOLIC BLOOD PRESSURE: 83 MMHG | OXYGEN SATURATION: 96 % | HEART RATE: 82 BPM | SYSTOLIC BLOOD PRESSURE: 127 MMHG | BODY MASS INDEX: 27.57 KG/M2 | WEIGHT: 221.69 LBS | RESPIRATION RATE: 18 BRPM | HEIGHT: 75 IN

## 2024-07-03 DIAGNOSIS — E86.0 DEHYDRATION: ICD-10-CM

## 2024-07-03 DIAGNOSIS — C78.7 METASTASIS TO LIVER: Primary | ICD-10-CM

## 2024-07-03 DIAGNOSIS — D70.1 CHEMOTHERAPY INDUCED NEUTROPENIA: ICD-10-CM

## 2024-07-03 DIAGNOSIS — T45.1X5A CHEMOTHERAPY INDUCED NEUTROPENIA: ICD-10-CM

## 2024-07-03 PROCEDURE — 96377 APPLICATON ON-BODY INJECTOR: CPT

## 2024-07-03 PROCEDURE — A4216 STERILE WATER/SALINE, 10 ML: HCPCS | Performed by: NURSE PRACTITIONER

## 2024-07-03 PROCEDURE — 25000003 PHARM REV CODE 250: Performed by: NURSE PRACTITIONER

## 2024-07-03 PROCEDURE — 63600175 PHARM REV CODE 636 W HCPCS: Performed by: NURSE PRACTITIONER

## 2024-07-03 PROCEDURE — 96361 HYDRATE IV INFUSION ADD-ON: CPT

## 2024-07-03 PROCEDURE — 96374 THER/PROPH/DIAG INJ IV PUSH: CPT

## 2024-07-03 RX ORDER — PROCHLORPERAZINE EDISYLATE 5 MG/ML
10 INJECTION INTRAMUSCULAR; INTRAVENOUS ONCE AS NEEDED
Status: COMPLETED | OUTPATIENT
Start: 2024-07-03 | End: 2024-07-03

## 2024-07-03 RX ORDER — HEPARIN 100 UNIT/ML
500 SYRINGE INTRAVENOUS
Status: DISCONTINUED | OUTPATIENT
Start: 2024-07-03 | End: 2024-07-03 | Stop reason: HOSPADM

## 2024-07-03 RX ORDER — SODIUM CHLORIDE 0.9 % (FLUSH) 0.9 %
10 SYRINGE (ML) INJECTION
Status: DISCONTINUED | OUTPATIENT
Start: 2024-07-03 | End: 2024-07-03 | Stop reason: HOSPADM

## 2024-07-03 RX ADMIN — PEGFILGRASTIM 6 MG: KIT SUBCUTANEOUS at 11:07

## 2024-07-03 RX ADMIN — SODIUM CHLORIDE 1000 ML: 9 INJECTION, SOLUTION INTRAVENOUS at 11:07

## 2024-07-03 RX ADMIN — HEPARIN 500 UNITS: 100 SYRINGE at 12:07

## 2024-07-03 RX ADMIN — SODIUM CHLORIDE, PRESERVATIVE FREE 10 ML: 5 INJECTION INTRAVENOUS at 12:07

## 2024-07-03 RX ADMIN — PROCHLORPERAZINE EDISYLATE 10 MG: 5 INJECTION INTRAMUSCULAR; INTRAVENOUS at 11:07

## 2024-07-05 ENCOUNTER — PATIENT MESSAGE (OUTPATIENT)
Facility: CLINIC | Age: 44
End: 2024-07-05
Payer: COMMERCIAL

## 2024-07-08 ENCOUNTER — OFFICE VISIT (OUTPATIENT)
Dept: SURGERY | Facility: CLINIC | Age: 44
End: 2024-07-08
Payer: COMMERCIAL

## 2024-07-08 VITALS
DIASTOLIC BLOOD PRESSURE: 92 MMHG | SYSTOLIC BLOOD PRESSURE: 146 MMHG | BODY MASS INDEX: 27.12 KG/M2 | HEART RATE: 74 BPM | OXYGEN SATURATION: 98 % | HEIGHT: 75 IN | WEIGHT: 218.13 LBS

## 2024-07-08 DIAGNOSIS — C20 RECTAL ADENOCARCINOMA METASTATIC TO LIVER: ICD-10-CM

## 2024-07-08 DIAGNOSIS — C78.7 RECTAL ADENOCARCINOMA METASTATIC TO LIVER: ICD-10-CM

## 2024-07-08 DIAGNOSIS — C78.7 METASTASIS TO LIVER: Primary | ICD-10-CM

## 2024-07-08 PROCEDURE — 3008F BODY MASS INDEX DOCD: CPT | Mod: CPTII,S$GLB,, | Performed by: STUDENT IN AN ORGANIZED HEALTH CARE EDUCATION/TRAINING PROGRAM

## 2024-07-08 PROCEDURE — 3077F SYST BP >= 140 MM HG: CPT | Mod: CPTII,S$GLB,, | Performed by: STUDENT IN AN ORGANIZED HEALTH CARE EDUCATION/TRAINING PROGRAM

## 2024-07-08 PROCEDURE — 4010F ACE/ARB THERAPY RXD/TAKEN: CPT | Mod: CPTII,S$GLB,, | Performed by: STUDENT IN AN ORGANIZED HEALTH CARE EDUCATION/TRAINING PROGRAM

## 2024-07-08 PROCEDURE — G2211 COMPLEX E/M VISIT ADD ON: HCPCS | Mod: S$GLB,,, | Performed by: STUDENT IN AN ORGANIZED HEALTH CARE EDUCATION/TRAINING PROGRAM

## 2024-07-08 PROCEDURE — 1159F MED LIST DOCD IN RCRD: CPT | Mod: CPTII,S$GLB,, | Performed by: STUDENT IN AN ORGANIZED HEALTH CARE EDUCATION/TRAINING PROGRAM

## 2024-07-08 PROCEDURE — 3080F DIAST BP >= 90 MM HG: CPT | Mod: CPTII,S$GLB,, | Performed by: STUDENT IN AN ORGANIZED HEALTH CARE EDUCATION/TRAINING PROGRAM

## 2024-07-08 PROCEDURE — 1160F RVW MEDS BY RX/DR IN RCRD: CPT | Mod: CPTII,S$GLB,, | Performed by: STUDENT IN AN ORGANIZED HEALTH CARE EDUCATION/TRAINING PROGRAM

## 2024-07-08 PROCEDURE — 99999 PR PBB SHADOW E&M-EST. PATIENT-LVL IV: CPT | Mod: PBBFAC,,, | Performed by: STUDENT IN AN ORGANIZED HEALTH CARE EDUCATION/TRAINING PROGRAM

## 2024-07-08 PROCEDURE — 99214 OFFICE O/P EST MOD 30 MIN: CPT | Mod: S$GLB,,, | Performed by: STUDENT IN AN ORGANIZED HEALTH CARE EDUCATION/TRAINING PROGRAM

## 2024-07-08 NOTE — PROGRESS NOTES
Surgical Oncology Clinic Note      Referring Provider: No ref. provider found   PCP: Oliver Hatch MD    Reason For Visit: No chief complaint on file.      Oncologic History:  3/8/2024: Colonoscopy - 5 cm fungating rectal lesion, adenocarcinoma  3/27/2024: PET - Avid low rectal lesion with perirectal node. Right lung nodule favored to be benign. 2 foci suspicious for liver metastasis  3/27/2024: Rectal MRI - polypoid lesion in rectum at site of diagnosed adenocarcinoma.   4/8/2024: MRI Abdomen - Numerous small enhancing lesions with restricted diffusion concerning for metastasis. Bilobar, most sub centimeter    History of Present Illness:  Yong Arenas is a 44 y.o. 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 He noted increased frequency of rectal bleeding with bowel movements over the past year or so and recently experienced prolapsing rectal mass. He presented for a colonoscopy on 3/8/2024 in [place] and a fungating lesion was appreciated in the rectum, 5 cm in length. Biopsy demonstrated adenocarcinoma. Since his diagnosis, he has undergone staging workup with PETCT, rectal MRI and abdominal MRI. Abdominal MRI demonstrated numerous bilobar lesions, mostly sub centimeter, suspicious for metastasis which are too small for biopsy. Dr. Calero with colorectal surgery and undergoing a flex sig. He has already established with Dr. Samayoa with medical oncology and Dr. Arellano with radiation oncology.      Interval Since Last Visit:    7/7/2024: Yong Arenas returns today for follow-up after 5 cycles of Folfox and Avastin and evaluate the progression of liver metastasis and restaging. Patient has tolerated chemo well. Still maintaining his strength. He notes he is still constipated but is much improved from before and denies any obstruction.      6/17/24: PET - Numerous  scattered enhancing lesions with restricted diffusion in the liver suspicious for metastatic disease not significantly changed compared to images of the prior MRI 04/12/2024     Pathology:  Colon-Rectum: Invasive adenocarcinoma, moderately differentiated in fragments of TVA with extensive high-grade dysplasia     Staging: Cancer Staging   Rectal adenocarcinoma metastatic to liver  Staging form: Colon and Rectum, AJCC 8th Edition  - Clinical stage from 4/22/2024: Stage IRINEO (cN1a, cM1a) - Signed by Nii Rudd Jr., MD on 4/26/2024       Active Treatment:    Plan Name OP GI [id number] (oxaliplatin leucovorin fluorouracil) with bevacizumab Q2W   Treatment Goal Curative   Status Active   Start Date 5/6/2024   Provider R Bran Samayoa MD   Chemotherapy fluorouraciL injection 935 mg, 400 mg/m2 = 935 mg, 5 of 24 cycles, last given on 7/1/2024    oxaliplatin (ELOXATIN) 85 mg/m2 = 199 mg in dextrose 5 % (D5W) 604.8 mL chemo infusion, 85 mg/m2 = 199 mg, 5 of 24 cycles, last given on 7/1/2024    fluorouracil (Adrucil) 2,400 mg/m2 = 5,615 mg in sodium chloride 0.9% 250 mL chemo infusion, 2,400 mg/m2 = 5,615 mg, 5 of 24 cycles, last given on 7/1/2024    bevacizumab-awwb (MVASI) 500 mg in sodium chloride 0.9% 100 mL infusion, 515 mg, 5 of 24 cycles, last given on 7/1/2024           Current Outpatient Medications:     amLODIPine (NORVASC) 2.5 MG tablet, Take 1 tablet by mouth once daily., Disp: , Rfl:     amoxicillin-clavulanate 875-125mg (AUGMENTIN) 875-125 mg per tablet, Take 1 tablet by mouth every 12 (twelve) hours. (Patient not taking: Reported on 7/1/2024), Disp: 10 tablet, Rfl: 0    butalbital-acetaminophen-caffeine -40 mg (FIORICET, ESGIC) -40 mg per tablet, Take 1 tablet by mouth every 4 (four) hours as needed for Pain. (Patient not taking: Reported on 7/1/2024), Disp: 20 tablet, Rfl: 0    duke's soln (benadryl 30 mL, mylanta 30 mL, LIDOcaine 30 mL, nystatin 30 mL) 120mL, Take 10 mLs by mouth 4 (four) times  daily. (Patient not taking: Reported on 6/3/2024), Disp: 120 mL, Rfl: 0    hydrALAZINE (APRESOLINE) 25 MG tablet, Take 1 tablet (25 mg total) by mouth 3 (three) times daily as needed (Severely elevated blood pressure above 190/100)., Disp: 30 tablet, Rfl: 0    HYDROcodone-acetaminophen (NORCO) 5-325 mg per tablet, Take 1 tablet by mouth every 8 (eight) hours as needed for Pain. (Patient not taking: Reported on 4/22/2024), Disp: 15 tablet, Rfl: 0    ibuprofen (ADVIL,MOTRIN) 200 MG tablet, Take 200 mg by mouth every 6 (six) hours as needed for Pain. (Patient not taking: Reported on 7/1/2024), Disp: , Rfl:     loratadine-pseudoephedrine 5-120 mg (CLARITIN-D 12 HOUR) 5-120 mg per tablet, Take by mouth once daily. (Patient not taking: Reported on 7/1/2024), Disp: , Rfl:     losartan (COZAAR) 100 MG tablet, Take 1 tablet by mouth once daily., Disp: , Rfl:     omeprazole (PRILOSEC) 40 MG capsule, Take 1 capsule by mouth daily as needed., Disp: , Rfl:     ondansetron (ZOFRAN) 8 MG tablet, Take 1 tablet (8 mg total) by mouth every 8 (eight) hours as needed for Nausea. (Patient not taking: Reported on 4/22/2024), Disp: 30 tablet, Rfl: 2    promethazine (PHENERGAN) 25 MG tablet, Take 1 tablet (25 mg total) by mouth every 4 to 6 hours as needed for Nausea. (Patient not taking: Reported on 4/22/2024), Disp: 30 tablet, Rfl: 2    testosterone cypionate (DEPOTESTOTERONE CYPIONATE) 200 mg/mL injection, Inject 1 mL into the muscle every 14 (fourteen) days. (Patient not taking: Reported on 7/1/2024), Disp: , Rfl:     Review of patient's allergies indicates:  No Known Allergies    Past Medical History:   Diagnosis Date    Back pain     Cancer     Cervical pain     GERD (gastroesophageal reflux disease)     HLD (hyperlipidemia)     Hypertension     Tinnitus, unspecified ear        Past Surgical History:   Procedure Laterality Date    EGD, WITH BALLOON DILATION OF LESS THAN 30 MILLIMETERS  2020    INSERTION OF TUNNELED CENTRAL VENOUS  CATHETER (CVC) WITH SUBCUTANEOUS PORT Left 2024    Procedure: INSERTION, PORT-A-CATH;  Surgeon: Tash Castellanos MD;  Location: [facility];  Service: General;  Laterality: Left;  port placed to right chest , tunneled over to left chest, subclavian    WISDOM TOOTH EXTRACTION      x2       Family History   Problem Relation Name Age of Onset    Cancer Mother          liposarcoma on leg    Prostate cancer Father      Colon polyps Father      Leukemia Niece         Social History     Socioeconomic History    Marital status:    Tobacco Use    Smoking status: Former     Types: Cigarettes     Start date:      Quit date:      Years since quittin.5    Smokeless tobacco: Never   Substance and Sexual Activity    Alcohol use: Not Currently     Comment: social    Drug use: Never       Review of Systems   All other systems reviewed and are negative.      There were no vitals filed for this visit.  There is no height or weight on file to calculate BMI.  Physical Exam  Vitals reviewed.   Constitutional:       General: He is not in acute distress.     Appearance: Normal appearance.   HENT:      Head: Normocephalic and atraumatic.      Mouth/Throat:      Mouth: Mucous membranes are moist.      Pharynx: Oropharynx is clear.   Eyes:      General: No scleral icterus.     Extraocular Movements: Extraocular movements intact.      Conjunctiva/sclera: Conjunctivae normal.   Cardiovascular:      Rate and Rhythm: Normal rate.   Pulmonary:      Effort: Pulmonary effort is normal. No respiratory distress.   Abdominal:      General: Abdomen is flat. There is no distension.      Palpations: Abdomen is soft. There is no mass.   Musculoskeletal:         General: No swelling or tenderness. Normal range of motion.      Cervical back: Normal range of motion and neck supple.   Skin:     General: Skin is warm and dry.      Coloration: Skin is not jaundiced.   Neurological:      General: No focal deficit present.      Mental Status: He  is alert and oriented to person, place, and time.   Psychiatric:         Mood and Affect: Mood normal.         Behavior: Behavior normal.          DATA REVIEW:  I personally reviewed the following records for this visit: lab work from prior visit, notes from other physicians, surgical pathology results, endoscopy results, radiographic study evaluation, laboratory results done by primary care physician       Lab Results   Component Value Date    WBC 3.80 (L) 06/10/2024    HGB 13.8 (L) 06/10/2024    HCT 39.8 (L) 06/10/2024     06/10/2024    ALT 38 06/10/2024    AST 20 06/10/2024     06/10/2024    K 3.7 06/10/2024     06/10/2024    CREATININE 0.9 06/10/2024    BUN 11 06/10/2024    CO2 25 06/10/2024    INR 0.9 04/15/2024       Lab Results   Component Value Date    CEA 36.9 (H) 04/12/2024        Radiology:         ASSESSMENT & PLAN:  No diagnosis found.   Yong Arenas is a 44 y.o. male rectal adenocarcinoma with diffuse, bilobar, colorectal liver metastasis. He has completed 5 cycles of chemotherapy and tolerated them relatively well. On repeat imaging the previous small enhancing lesions sub centimeter lesions with restricted diffusion in the liver have not significantly changed compared to images of the prior MRI 04/12/2024. As talked about previously, unfortunately these are too small for biopsy and would not be a candidate for liver resection. Need to follow up with colorectal surgery and oncology to decide the best course forward. Option to treat the primary rectal cancer with radiation and then continue chemo for the liver metastasis. If plans for resection of the rectal mass were made; a hepatic artery pump could be placed at the same time allowing for more direct targeting of the liver metastasis with chemo.     Plan:  - continue chemo for now and consider radiation of primary rectal mass  - present at tumor board on Thursday   - If primary resection is planned; hepatic artery pump could  be placed at the same time        Follow-up: No follow-ups on file.       Ana M Thomas MD  [facility], PGY-1

## 2024-07-09 ENCOUNTER — OFFICE VISIT (OUTPATIENT)
Facility: CLINIC | Age: 44
End: 2024-07-09
Payer: COMMERCIAL

## 2024-07-09 VITALS
BODY MASS INDEX: 27.62 KG/M2 | TEMPERATURE: 98 F | HEART RATE: 97 BPM | HEIGHT: 75 IN | SYSTOLIC BLOOD PRESSURE: 135 MMHG | WEIGHT: 222.13 LBS | DIASTOLIC BLOOD PRESSURE: 85 MMHG

## 2024-07-09 DIAGNOSIS — C20 RECTAL ADENOCARCINOMA METASTATIC TO LIVER: Primary | ICD-10-CM

## 2024-07-09 DIAGNOSIS — C78.7 METASTASIS TO LIVER: ICD-10-CM

## 2024-07-09 DIAGNOSIS — C78.7 RECTAL ADENOCARCINOMA METASTATIC TO LIVER: Primary | ICD-10-CM

## 2024-07-09 PROCEDURE — 99215 OFFICE O/P EST HI 40 MIN: CPT | Mod: S$GLB,,, | Performed by: INTERNAL MEDICINE

## 2024-07-09 PROCEDURE — 3075F SYST BP GE 130 - 139MM HG: CPT | Mod: CPTII,S$GLB,, | Performed by: INTERNAL MEDICINE

## 2024-07-09 PROCEDURE — G2211 COMPLEX E/M VISIT ADD ON: HCPCS | Mod: S$GLB,,, | Performed by: INTERNAL MEDICINE

## 2024-07-09 PROCEDURE — 4010F ACE/ARB THERAPY RXD/TAKEN: CPT | Mod: CPTII,S$GLB,, | Performed by: INTERNAL MEDICINE

## 2024-07-09 PROCEDURE — 3008F BODY MASS INDEX DOCD: CPT | Mod: CPTII,S$GLB,, | Performed by: INTERNAL MEDICINE

## 2024-07-09 PROCEDURE — 3079F DIAST BP 80-89 MM HG: CPT | Mod: CPTII,S$GLB,, | Performed by: INTERNAL MEDICINE

## 2024-07-09 PROCEDURE — 99999 PR PBB SHADOW E&M-EST. PATIENT-LVL III: CPT | Mod: PBBFAC,,, | Performed by: INTERNAL MEDICINE

## 2024-07-09 NOTE — PROGRESS NOTES
[facility] HEMATOLGY ONCOLOGY CONSULTATION    Subjective:       Patient ID: Yong Arenas is a 44 y.o. male returning today for a regularly scheduled follow-up visit.    Chief Complaint: Rectal Cancer with liver mets.       HPI  Rectal cancer, liver metastases, LN.  Treated with chemo.  Folfox Avastin x's 5.  PET MRI slight decrease in rectal mass, larger liver masses resolved, smaller nodules no change.  CEA 36 to 5.      Past Medical History:   Diagnosis Date    Back pain     Cancer     Cervical pain     GERD (gastroesophageal reflux disease)     HLD (hyperlipidemia)     Hypertension     Tinnitus, unspecified ear        Past Surgical History:   Procedure Laterality Date    EGD, WITH BALLOON DILATION OF LESS THAN 30 MILLIMETERS      INSERTION OF TUNNELED CENTRAL VENOUS CATHETER (CVC) WITH SUBCUTANEOUS PORT Left 2024    Procedure: INSERTION, PORT-A-CATH;  Surgeon: Tash Castellanos MD;  Location: [facility];  Service: General;  Laterality: Left;  port placed to right chest , tunneled over to left chest, subclavian    WISDOM TOOTH EXTRACTION      x2       Social History     Socioeconomic History    Marital status:    Tobacco Use    Smoking status: Former     Types: Cigarettes     Start date:      Quit date:      Years since quittin.5    Smokeless tobacco: Never   Substance and Sexual Activity    Alcohol use: Not Currently     Comment: social    Drug use: Never       Family History   Problem Relation Name Age of Onset    Cancer Mother          liposarcoma on leg    Prostate cancer Father      Colon polyps Father      Leukemia Niece         Review of patient's allergies indicates:  No Known Allergies    Current Outpatient Medications:     amLODIPine (NORVASC) 2.5 MG tablet, Take 1 tablet by mouth once daily., Disp: , Rfl:     amoxicillin-clavulanate 875-125mg (AUGMENTIN) 875-125 mg per tablet, Take 1 tablet by mouth every 12 (twelve) hours., Disp: 10 tablet, Rfl: 0    duke's soln (benadryl 30 mL,  mylanta 30 mL, LIDOcaine 30 mL, nystatin 30 mL) 120mL, Take 10 mLs by mouth 4 (four) times daily., Disp: 120 mL, Rfl: 0    hydrALAZINE (APRESOLINE) 25 MG tablet, Take 1 tablet (25 mg total) by mouth 3 (three) times daily as needed (Severely elevated blood pressure above 190/100)., Disp: 30 tablet, Rfl: 0    HYDROcodone-acetaminophen (NORCO) 5-325 mg per tablet, Take 1 tablet by mouth every 8 (eight) hours as needed for Pain., Disp: 15 tablet, Rfl: 0    ibuprofen (ADVIL,MOTRIN) 200 MG tablet, Take 200 mg by mouth every 6 (six) hours as needed for Pain., Disp: , Rfl:     loratadine-pseudoephedrine 5-120 mg (CLARITIN-D 12 HOUR) 5-120 mg per tablet, Take by mouth once daily., Disp: , Rfl:     losartan (COZAAR) 100 MG tablet, Take 1 tablet by mouth once daily., Disp: , Rfl:     omeprazole (PRILOSEC) 40 MG capsule, Take 1 capsule by mouth daily as needed., Disp: , Rfl:     ondansetron (ZOFRAN) 8 MG tablet, Take 1 tablet (8 mg total) by mouth every 8 (eight) hours as needed for Nausea., Disp: 30 tablet, Rfl: 2    promethazine (PHENERGAN) 25 MG tablet, Take 1 tablet (25 mg total) by mouth every 4 to 6 hours as needed for Nausea., Disp: 30 tablet, Rfl: 2    testosterone cypionate (DEPOTESTOTERONE CYPIONATE) 200 mg/mL injection, Inject 1 mL into the muscle every 14 (fourteen) days., Disp: , Rfl:     All medications and past history have been reviewed.    Review of Systems   Constitutional:  Positive for fatigue. Negative for activity change, appetite change and fever.   HENT:  Negative for congestion, mouth sores, postnasal drip, rhinorrhea, sinus pressure, sore throat and trouble swallowing.    Eyes:  Negative for photophobia and visual disturbance.   Respiratory:  Negative for cough, chest tightness, shortness of breath and wheezing.    Cardiovascular:  Negative for chest pain and leg swelling.   Gastrointestinal:  Negative for abdominal distention, abdominal pain, constipation, diarrhea, nausea and vomiting.   Endocrine:  "Negative for cold intolerance.   Genitourinary:  Negative for decreased urine volume, dysuria and frequency.   Musculoskeletal:  Negative for arthralgias and myalgias.   Skin:  Negative for pallor and rash.   Allergic/Immunologic: Negative for immunocompromised state.   Neurological:  Negative for dizziness, syncope, weakness, light-headedness, numbness and headaches.   Hematological:  Negative for adenopathy. Does not bruise/bleed easily.   Psychiatric/Behavioral:  Negative for sleep disturbance. The patient is not nervous/anxious.        Objective:        /85   Pulse 97   Temp 98.2 °F (36.8 °C)   Ht 6' 3" (1.905 m)   Wt 100.7 kg (222 lb 1.6 oz)   BMI 27.76 kg/m²     Physical Exam  Constitutional:       Appearance: Normal appearance.   HENT:      Head: Normocephalic and atraumatic.      Mouth/Throat:      Mouth: Mucous membranes are moist.   Cardiovascular:      Rate and Rhythm: Normal rate and regular rhythm.      Pulses: Normal pulses.      Heart sounds: Normal heart sounds.   Pulmonary:      Effort: Pulmonary effort is normal. No respiratory distress.      Breath sounds: Normal breath sounds. No wheezing.   Abdominal:      General: There is no distension.      Palpations: Abdomen is soft. There is no mass.      Tenderness: There is no abdominal tenderness.   Musculoskeletal:         General: No swelling. Normal range of motion.      Right lower leg: No edema.      Left lower leg: No edema.   Skin:     General: Skin is warm and dry.      Capillary Refill: Capillary refill takes 2 to 3 seconds.      Findings: No bruising or rash.   Neurological:      Mental Status: He is alert and oriented to person, place, and time. Mental status is at baseline.      Motor: No weakness.   Psychiatric:         Mood and Affect: Mood normal.         Behavior: Behavior normal.             Recent Results (from the past 336 hour(s))   CBC w/ DIFF    Collection Time: 07/19/24  9:46 AM   Result Value Ref Range    WBC 5.12 3.90 " - 12.70 K/uL    Hemoglobin 12.6 (L) 14.0 - 18.0 g/dL    Hematocrit 37.5 (L) 40.0 - 54.0 %    Platelets 164 150 - 450 K/uL     CMP  Sodium   Date Value Ref Range Status   07/19/2024 138 136 - 145 mmol/L Final     Potassium   Date Value Ref Range Status   07/19/2024 4.0 3.5 - 5.1 mmol/L Final     Chloride   Date Value Ref Range Status   07/19/2024 104 95 - 110 mmol/L Final     CO2   Date Value Ref Range Status   07/19/2024 25 23 - 29 mmol/L Final     Glucose   Date Value Ref Range Status   07/19/2024 86 70 - 110 mg/dL Final     BUN   Date Value Ref Range Status   07/19/2024 11 6 - 20 mg/dL Final     Creatinine   Date Value Ref Range Status   07/19/2024 0.9 0.5 - 1.4 mg/dL Final     Calcium   Date Value Ref Range Status   07/19/2024 9.1 8.7 - 10.5 mg/dL Final     Total Protein   Date Value Ref Range Status   07/19/2024 6.9 6.0 - 8.4 g/dL Final     Albumin   Date Value Ref Range Status   07/19/2024 4.1 3.5 - 5.2 g/dL Final     Total Bilirubin   Date Value Ref Range Status   07/19/2024 0.5 0.1 - 1.0 mg/dL Final     Comment:     For infants and newborns, interpretation of results should be based  on gestational age, weight and in agreement with clinical  observations.    Premature Infant recommended reference ranges:  Up to 24 hours.............<8.0 mg/dL  Up to 48 hours............<12.0 mg/dL  3-5 days..................<15.0 mg/dL  6-29 days.................<15.0 mg/dL       Alkaline Phosphatase   Date Value Ref Range Status   07/19/2024 77 55 - 135 U/L Final     AST   Date Value Ref Range Status   07/19/2024 25 10 - 40 U/L Final     ALT   Date Value Ref Range Status   07/19/2024 34 10 - 44 U/L Final     Anion Gap   Date Value Ref Range Status   07/19/2024 9 8 - 16 mmol/L Final         Results for orders placed or performed during the hospital encounter of 06/26/24 (from the past 2160 hour(s))   NM PET CT FDG Skull Base to Mid Thigh    Impression    1. Resolution of 2 previously FDG avid foci in the liver suggestive of  positive therapy response.  2. Slight decreased soft tissue thickening in the known rectal lesion compared to the prior study.      Electronically signed by: Sreekanth Park MD  Date:    06/26/2024  Time:    09:41     Results for orders placed or performed during the hospital encounter of 06/10/24 (from the past 2160 hour(s))   CT Head Without Contrast    Narrative    EXAMINATION:  CT HEAD WITHOUT CONTRAST    CLINICAL HISTORY:  Headache, sudden, severe;.    COMPARISON:  None.    FINDINGS:  There is no evidence of intracranial mass, hemorrhage, or midline shift.  The ventricles and sulci are within normal limits.  There are no pathologic extra-axial fluid collections.    There is no evidence of ischemic change or edema.  Cerebellum and brainstem are unremarkable.    The calvarium is intact.  Mucous retention cysts or polyps are noted in both maxillary sinuses, the largest on the right measuring 3.2 cm.      Impression    1. Normal CT appearance of the brain.      Electronically signed by: Warren Jo  Date:    06/10/2024  Time:    15:18                  Plan is to discuss and decide on timing of chemo further, Rad Rx and surgery.    Addendum:  Coordinated care with Dr. Rudd, colorectal surgery and Dr. Arellano and myself.    Proceed with pelvic irradiation and 5FU infusion 5/22/24.    Thereafter Folfox Avastin for cycle 6 to cycle 8.    Followed by surgical re evaluation.    Being considered for intrahepatic TACE Rx, liver transplant?    RTC 5/22/24.

## 2024-07-11 ENCOUNTER — TELEPHONE (OUTPATIENT)
Dept: TRANSPLANT | Facility: CLINIC | Age: 44
End: 2024-07-11
Payer: COMMERCIAL

## 2024-07-11 ENCOUNTER — TELEPHONE (OUTPATIENT)
Facility: CLINIC | Age: 44
End: 2024-07-11
Payer: COMMERCIAL

## 2024-07-11 ENCOUNTER — DOCUMENTATION ONLY (OUTPATIENT)
Dept: RADIATION ONCOLOGY | Facility: CLINIC | Age: 44
End: 2024-07-11

## 2024-07-11 ENCOUNTER — DOCUMENTATION ONLY (OUTPATIENT)
Dept: HEMATOLOGY/ONCOLOGY | Facility: CLINIC | Age: 44
End: 2024-07-11
Payer: COMMERCIAL

## 2024-07-11 NOTE — TELEPHONE ENCOUNTER
Referral received from Angelique Duarte, CNS   Nurse Practitioner    Patient with Rectal adenocarcinoma metastatic to liver  ICD-10:  C20, C78.7        Referred for liver transplant for CONSULT    Referral completed and forwarded to [facility].          Insurance: [employer]

## 2024-07-11 NOTE — PROGRESS NOTES
After recent tumor board discussion, phone and epic conversations took place between Ivonne Samayoa, Blaire, Galilea, and myself wherein consensus agreement to proceed w/ long-course pelvic CRT followed by 3 additional FOLFOX cycles was established.    This change was driven by higher likelihood of tumor downsizing to minimize (or preclude) future surgery w/ this sequence/regimen compared to FOLFOX --> short-course RT.    Also a factor discussed was patients current statements of declining any surgeries requiring to temporary or permanent ostomy (which is inevitable if cCR not achieved, per CR surgery).    Subsequent plans for liver met directed therapies remain unchanged and directed by Dr. Rudd, possibly in conjunction w/ future TME.    Portions of conversation documented below:

## 2024-07-11 NOTE — PROGRESS NOTES
[facility]     Colorectal Liver Metastasis Tumor Board Note      Date: 07/11/2024    Referring Provider: Dr. Rudd     Case Overview: Mr. Arenas (44M) was initially diagnosed in 03/2024 with rectal adenocarcinoma with synchronous liver metastasis. He has received 5 cycles of FOLFOX + bevacizumab.     Participants: medical oncology, surgical oncology, colorectal surgery, transplant surgeons, interventional radiology, and oncology navigator     Imaging Reviewed: PET    Radiology Review: Multiple, small, scattered foci of restricted diffusion and hypoenhancement throughout both hepatic lobes, relatively stable compared to prior and not significantly hypermetabolic on PET.    Orders/Referrals: Referral to transplant    Board Recommendations: Continue systemic therapy x 3 additional cycles followed by restaging imaging. Consider short course radiation and primary resection at that time. Discuss at colorectal TB conference next week. Will refer to transplant to initiate treatment discussions.

## 2024-07-12 ENCOUNTER — TELEPHONE (OUTPATIENT)
Facility: CLINIC | Age: 44
End: 2024-07-12
Payer: COMMERCIAL

## 2024-07-12 NOTE — TELEPHONE ENCOUNTER
----- Message from Shelly Jean-Baptiste sent at 7/12/2024 10:43 AM CDT -----  PT has dental appt today w/ a possible abscess/infection. Requesting clearance for potential sx/extraction. PT is seeing Dr. Levin in [place].     CB: [phone]

## 2024-07-14 RX ORDER — SODIUM CHLORIDE 0.9 % (FLUSH) 0.9 %
10 SYRINGE (ML) INJECTION
Status: CANCELLED | OUTPATIENT
Start: 2024-07-19

## 2024-07-14 RX ORDER — ONDANSETRON HYDROCHLORIDE 2 MG/ML
16 INJECTION, SOLUTION INTRAVENOUS ONCE
Status: CANCELLED | OUTPATIENT
Start: 2024-07-15

## 2024-07-14 RX ORDER — SODIUM CHLORIDE 0.9 % (FLUSH) 0.9 %
10 SYRINGE (ML) INJECTION
Status: CANCELLED | OUTPATIENT
Start: 2024-07-15

## 2024-07-14 RX ORDER — HEPARIN 100 UNIT/ML
500 SYRINGE INTRAVENOUS
Status: CANCELLED | OUTPATIENT
Start: 2024-07-19

## 2024-07-14 RX ORDER — DIPHENHYDRAMINE HYDROCHLORIDE 50 MG/ML
50 INJECTION INTRAMUSCULAR; INTRAVENOUS ONCE AS NEEDED
Status: CANCELLED | OUTPATIENT
Start: 2024-07-15

## 2024-07-14 RX ORDER — EPINEPHRINE 0.3 MG/.3ML
0.3 INJECTION SUBCUTANEOUS ONCE AS NEEDED
Status: CANCELLED | OUTPATIENT
Start: 2024-07-15

## 2024-07-14 RX ORDER — HEPARIN 100 UNIT/ML
500 SYRINGE INTRAVENOUS
Status: CANCELLED | OUTPATIENT
Start: 2024-07-15

## 2024-07-15 ENCOUNTER — INFUSION (OUTPATIENT)
Dept: INFUSION THERAPY | Facility: HOSPITAL | Age: 44
End: 2024-07-15
Attending: INTERNAL MEDICINE
Payer: COMMERCIAL

## 2024-07-15 DIAGNOSIS — C78.7 RECTAL ADENOCARCINOMA METASTATIC TO LIVER: ICD-10-CM

## 2024-07-15 DIAGNOSIS — C20 RECTAL ADENOCARCINOMA METASTATIC TO LIVER: ICD-10-CM

## 2024-07-15 DIAGNOSIS — C78.7 METASTASIS TO LIVER: ICD-10-CM

## 2024-07-15 DIAGNOSIS — T45.1X5A CHEMOTHERAPY INDUCED NEUTROPENIA: Primary | ICD-10-CM

## 2024-07-15 DIAGNOSIS — D70.1 CHEMOTHERAPY INDUCED NEUTROPENIA: Primary | ICD-10-CM

## 2024-07-15 DIAGNOSIS — E86.0 DEHYDRATION: ICD-10-CM

## 2024-07-15 PROCEDURE — 25000003 PHARM REV CODE 250: Performed by: INTERNAL MEDICINE

## 2024-07-15 PROCEDURE — 63600175 PHARM REV CODE 636 W HCPCS: Performed by: INTERNAL MEDICINE

## 2024-07-15 PROCEDURE — 96416 CHEMO PROLONG INFUSE W/PUMP: CPT

## 2024-07-15 RX ADMIN — SODIUM CHLORIDE 2080 MG: 9 INJECTION, SOLUTION INTRAVENOUS at 10:07

## 2024-07-15 NOTE — NURSING
Patient declined the Zofran IV due to causes constipation and states he has oral medication at home if needed. Encouraged patient to let us know if he develops n/v and we will be able to add him on for ivf's prn. He and wife voiced understanding.

## 2024-07-16 ENCOUNTER — TELEPHONE (OUTPATIENT)
Dept: TRANSPLANT | Facility: CLINIC | Age: 44
End: 2024-07-16
Payer: COMMERCIAL

## 2024-07-16 ENCOUNTER — TREATMENT (OUTPATIENT)
Dept: RADIATION ONCOLOGY | Facility: CLINIC | Age: 44
End: 2024-07-16
Payer: COMMERCIAL

## 2024-07-16 PROCEDURE — 77014 PR  CT GUIDANCE PLACEMENT RAD THERAPY FIELDS: CPT | Mod: S$GLB,,, | Performed by: RADIOLOGY

## 2024-07-16 PROCEDURE — G6015 RADIATION TX DELIVERY IMRT: HCPCS | Mod: S$GLB,,, | Performed by: RADIOLOGY

## 2024-07-16 NOTE — TELEPHONE ENCOUNTER
----- Message from Calos Pulido MD sent at 7/11/2024  7:42 AM CDT -----  Regarding: Colorectal patient referrals - need clinic visit  Hi All    These two patients from colorectal cancer conference need clinic appts with Dr Meléndez and myself within the next 1-2 weeks.     Yong Arenas [id number]    Alina Woodard [phone]    Thanks    Calos

## 2024-07-19 ENCOUNTER — INFUSION (OUTPATIENT)
Dept: INFUSION THERAPY | Facility: HOSPITAL | Age: 44
End: 2024-07-19
Attending: INTERNAL MEDICINE
Payer: COMMERCIAL

## 2024-07-19 VITALS
DIASTOLIC BLOOD PRESSURE: 96 MMHG | SYSTOLIC BLOOD PRESSURE: 155 MMHG | BODY MASS INDEX: 27.89 KG/M2 | OXYGEN SATURATION: 98 % | TEMPERATURE: 98 F | HEIGHT: 75 IN | RESPIRATION RATE: 17 BRPM | HEART RATE: 74 BPM | WEIGHT: 224.31 LBS

## 2024-07-19 DIAGNOSIS — D70.1 CHEMOTHERAPY INDUCED NEUTROPENIA: Primary | ICD-10-CM

## 2024-07-19 DIAGNOSIS — T45.1X5A CHEMOTHERAPY INDUCED NEUTROPENIA: Primary | ICD-10-CM

## 2024-07-19 DIAGNOSIS — C20 RECTAL ADENOCARCINOMA METASTATIC TO LIVER: ICD-10-CM

## 2024-07-19 DIAGNOSIS — C20 ADENOCARCINOMA OF RECTUM: ICD-10-CM

## 2024-07-19 DIAGNOSIS — C78.7 METASTASIS TO LIVER: ICD-10-CM

## 2024-07-19 DIAGNOSIS — C78.7 RECTAL ADENOCARCINOMA METASTATIC TO LIVER: ICD-10-CM

## 2024-07-19 DIAGNOSIS — E86.0 DEHYDRATION: ICD-10-CM

## 2024-07-19 LAB
ALBUMIN SERPL BCP-MCNC: 4.1 G/DL (ref 3.5–5.2)
ALP SERPL-CCNC: 77 U/L (ref 55–135)
ALT SERPL W/O P-5'-P-CCNC: 34 U/L (ref 10–44)
ANION GAP SERPL CALC-SCNC: 9 MMOL/L (ref 8–16)
AST SERPL-CCNC: 25 U/L (ref 10–40)
BASOPHILS # BLD AUTO: 0.04 K/UL (ref 0–0.2)
BASOPHILS NFR BLD: 0.8 % (ref 0–1.9)
BILIRUB SERPL-MCNC: 0.5 MG/DL (ref 0.1–1)
BUN SERPL-MCNC: 11 MG/DL (ref 6–20)
CALCIUM SERPL-MCNC: 9.1 MG/DL (ref 8.7–10.5)
CHLORIDE SERPL-SCNC: 104 MMOL/L (ref 95–110)
CO2 SERPL-SCNC: 25 MMOL/L (ref 23–29)
CREAT SERPL-MCNC: 0.9 MG/DL (ref 0.5–1.4)
DIFFERENTIAL METHOD BLD: ABNORMAL
EOSINOPHIL # BLD AUTO: 0.1 K/UL (ref 0–0.5)
EOSINOPHIL NFR BLD: 2.3 % (ref 0–8)
ERYTHROCYTE [DISTWIDTH] IN BLOOD BY AUTOMATED COUNT: 17.3 % (ref 11.5–14.5)
EST. GFR  (NO RACE VARIABLE): >60 ML/MIN/1.73 M^2
GLUCOSE SERPL-MCNC: 86 MG/DL (ref 70–110)
HCT VFR BLD AUTO: 37.5 % (ref 40–54)
HGB BLD-MCNC: 12.6 G/DL (ref 14–18)
IMM GRANULOCYTES # BLD AUTO: 0.07 K/UL (ref 0–0.04)
IMM GRANULOCYTES NFR BLD AUTO: 1.4 % (ref 0–0.5)
LYMPHOCYTES # BLD AUTO: 1.1 K/UL (ref 1–4.8)
LYMPHOCYTES NFR BLD: 22.1 % (ref 18–48)
MCH RBC QN AUTO: 30.3 PG (ref 27–31)
MCHC RBC AUTO-ENTMCNC: 33.6 G/DL (ref 32–36)
MCV RBC AUTO: 90 FL (ref 82–98)
MONOCYTES # BLD AUTO: 0.3 K/UL (ref 0.3–1)
MONOCYTES NFR BLD: 6.3 % (ref 4–15)
NEUTROPHILS # BLD AUTO: 3.4 K/UL (ref 1.8–7.7)
NEUTROPHILS NFR BLD: 67.1 % (ref 38–73)
NRBC BLD-RTO: 0 /100 WBC
PLATELET # BLD AUTO: 164 K/UL (ref 150–450)
PMV BLD AUTO: 10.5 FL (ref 9.2–12.9)
POTASSIUM SERPL-SCNC: 4 MMOL/L (ref 3.5–5.1)
PROT SERPL-MCNC: 6.9 G/DL (ref 6–8.4)
RBC # BLD AUTO: 4.16 M/UL (ref 4.6–6.2)
SODIUM SERPL-SCNC: 138 MMOL/L (ref 136–145)
WBC # BLD AUTO: 5.12 K/UL (ref 3.9–12.7)

## 2024-07-19 PROCEDURE — 36591 DRAW BLOOD OFF VENOUS DEVICE: CPT

## 2024-07-19 PROCEDURE — 63600175 PHARM REV CODE 636 W HCPCS: Performed by: INTERNAL MEDICINE

## 2024-07-19 PROCEDURE — A4216 STERILE WATER/SALINE, 10 ML: HCPCS | Performed by: INTERNAL MEDICINE

## 2024-07-19 PROCEDURE — 25000003 PHARM REV CODE 250: Performed by: INTERNAL MEDICINE

## 2024-07-19 PROCEDURE — 80053 COMPREHEN METABOLIC PANEL: CPT | Performed by: INTERNAL MEDICINE

## 2024-07-19 PROCEDURE — 85025 COMPLETE CBC W/AUTO DIFF WBC: CPT | Performed by: INTERNAL MEDICINE

## 2024-07-19 RX ORDER — HEPARIN 100 UNIT/ML
500 SYRINGE INTRAVENOUS
Status: DISCONTINUED | OUTPATIENT
Start: 2024-07-19 | End: 2024-07-19 | Stop reason: HOSPADM

## 2024-07-19 RX ORDER — SODIUM CHLORIDE 0.9 % (FLUSH) 0.9 %
10 SYRINGE (ML) INJECTION
Status: DISCONTINUED | OUTPATIENT
Start: 2024-07-19 | End: 2024-07-19 | Stop reason: HOSPADM

## 2024-07-19 RX ADMIN — HEPARIN 500 UNITS: 100 SYRINGE at 09:07

## 2024-07-19 RX ADMIN — SODIUM CHLORIDE, PRESERVATIVE FREE 10 ML: 5 INJECTION INTRAVENOUS at 09:07

## 2024-07-22 ENCOUNTER — INFUSION (OUTPATIENT)
Dept: INFUSION THERAPY | Facility: HOSPITAL | Age: 44
End: 2024-07-22
Attending: INTERNAL MEDICINE
Payer: COMMERCIAL

## 2024-07-22 VITALS
SYSTOLIC BLOOD PRESSURE: 130 MMHG | HEART RATE: 85 BPM | BODY MASS INDEX: 27.14 KG/M2 | TEMPERATURE: 98 F | RESPIRATION RATE: 18 BRPM | WEIGHT: 218.31 LBS | DIASTOLIC BLOOD PRESSURE: 88 MMHG | HEIGHT: 75 IN

## 2024-07-22 DIAGNOSIS — C78.7 METASTASIS TO LIVER: ICD-10-CM

## 2024-07-22 DIAGNOSIS — C78.7 RECTAL ADENOCARCINOMA METASTATIC TO LIVER: ICD-10-CM

## 2024-07-22 DIAGNOSIS — C20 RECTAL ADENOCARCINOMA METASTATIC TO LIVER: ICD-10-CM

## 2024-07-22 DIAGNOSIS — D70.1 CHEMOTHERAPY INDUCED NEUTROPENIA: Primary | ICD-10-CM

## 2024-07-22 DIAGNOSIS — E86.0 DEHYDRATION: ICD-10-CM

## 2024-07-22 DIAGNOSIS — T45.1X5A CHEMOTHERAPY INDUCED NEUTROPENIA: Primary | ICD-10-CM

## 2024-07-22 PROCEDURE — 25000003 PHARM REV CODE 250: Performed by: INTERNAL MEDICINE

## 2024-07-22 PROCEDURE — 96416 CHEMO PROLONG INFUSE W/PUMP: CPT

## 2024-07-22 PROCEDURE — 63600175 PHARM REV CODE 636 W HCPCS: Performed by: INTERNAL MEDICINE

## 2024-07-22 RX ADMIN — FLUOROURACIL 2080 MG: 50 INJECTION, SOLUTION INTRAVENOUS at 11:07

## 2024-07-23 ENCOUNTER — TREATMENT (OUTPATIENT)
Dept: RADIATION ONCOLOGY | Facility: CLINIC | Age: 44
End: 2024-07-23
Payer: COMMERCIAL

## 2024-07-26 ENCOUNTER — EVALUATION (OUTPATIENT)
Dept: TRANSPLANT | Facility: CLINIC | Age: 44
End: 2024-07-26
Payer: COMMERCIAL

## 2024-07-26 ENCOUNTER — INFUSION (OUTPATIENT)
Dept: INFUSION THERAPY | Facility: HOSPITAL | Age: 44
End: 2024-07-26
Attending: INTERNAL MEDICINE
Payer: COMMERCIAL

## 2024-07-26 ENCOUNTER — OFFICE VISIT (OUTPATIENT)
Facility: CLINIC | Age: 44
End: 2024-07-26
Payer: COMMERCIAL

## 2024-07-26 VITALS
SYSTOLIC BLOOD PRESSURE: 154 MMHG | HEART RATE: 80 BPM | OXYGEN SATURATION: 99 % | HEIGHT: 75 IN | DIASTOLIC BLOOD PRESSURE: 91 MMHG | RESPIRATION RATE: 16 BRPM | WEIGHT: 222.44 LBS | BODY MASS INDEX: 27.66 KG/M2 | TEMPERATURE: 97 F

## 2024-07-26 VITALS
HEART RATE: 60 BPM | DIASTOLIC BLOOD PRESSURE: 87 MMHG | OXYGEN SATURATION: 99 % | DIASTOLIC BLOOD PRESSURE: 91 MMHG | BODY MASS INDEX: 27.62 KG/M2 | WEIGHT: 222.13 LBS | HEIGHT: 75 IN | SYSTOLIC BLOOD PRESSURE: 138 MMHG | BODY MASS INDEX: 27.99 KG/M2 | TEMPERATURE: 97 F | HEIGHT: 75 IN | TEMPERATURE: 98 F | RESPIRATION RATE: 17 BRPM | HEART RATE: 69 BPM | WEIGHT: 225.13 LBS | RESPIRATION RATE: 18 BRPM | SYSTOLIC BLOOD PRESSURE: 137 MMHG

## 2024-07-26 DIAGNOSIS — C78.7 RECTAL CANCER METASTASIZED TO LIVER: ICD-10-CM

## 2024-07-26 DIAGNOSIS — C20 RECTAL ADENOCARCINOMA METASTATIC TO LIVER: Primary | ICD-10-CM

## 2024-07-26 DIAGNOSIS — E86.0 DEHYDRATION: ICD-10-CM

## 2024-07-26 DIAGNOSIS — R11.2 CHEMOTHERAPY INDUCED NAUSEA AND VOMITING: ICD-10-CM

## 2024-07-26 DIAGNOSIS — C78.7 METASTASIS TO LIVER: Primary | ICD-10-CM

## 2024-07-26 DIAGNOSIS — D70.1 CHEMOTHERAPY INDUCED NEUTROPENIA: ICD-10-CM

## 2024-07-26 DIAGNOSIS — C20 ADENOCARCINOMA OF RECTUM: ICD-10-CM

## 2024-07-26 DIAGNOSIS — C78.7 RECTAL ADENOCARCINOMA METASTATIC TO LIVER: Primary | ICD-10-CM

## 2024-07-26 DIAGNOSIS — C78.7 RECTAL ADENOCARCINOMA METASTATIC TO LIVER: ICD-10-CM

## 2024-07-26 DIAGNOSIS — T45.1X5A CHEMOTHERAPY INDUCED NEUTROPENIA: ICD-10-CM

## 2024-07-26 DIAGNOSIS — T45.1X5A CHEMOTHERAPY INDUCED NEUTROPENIA: Primary | ICD-10-CM

## 2024-07-26 DIAGNOSIS — C20 RECTAL ADENOCARCINOMA METASTATIC TO LIVER: ICD-10-CM

## 2024-07-26 DIAGNOSIS — T45.1X5A CHEMOTHERAPY INDUCED NAUSEA AND VOMITING: ICD-10-CM

## 2024-07-26 DIAGNOSIS — D70.1 CHEMOTHERAPY INDUCED NEUTROPENIA: Primary | ICD-10-CM

## 2024-07-26 DIAGNOSIS — C20 RECTAL CANCER METASTASIZED TO LIVER: ICD-10-CM

## 2024-07-26 DIAGNOSIS — C78.7 METASTASIS TO LIVER: ICD-10-CM

## 2024-07-26 LAB
ALBUMIN SERPL BCP-MCNC: 4.3 G/DL (ref 3.5–5.2)
ALP SERPL-CCNC: 69 U/L (ref 55–135)
ALT SERPL W/O P-5'-P-CCNC: 31 U/L (ref 10–44)
ANION GAP SERPL CALC-SCNC: 7 MMOL/L (ref 8–16)
AST SERPL-CCNC: 23 U/L (ref 10–40)
BASOPHILS # BLD AUTO: 0.05 K/UL (ref 0–0.2)
BASOPHILS NFR BLD: 1.2 % (ref 0–1.9)
BILIRUB SERPL-MCNC: 0.5 MG/DL (ref 0.1–1)
BUN SERPL-MCNC: 11 MG/DL (ref 6–20)
CALCIUM SERPL-MCNC: 8.9 MG/DL (ref 8.7–10.5)
CHLORIDE SERPL-SCNC: 107 MMOL/L (ref 95–110)
CO2 SERPL-SCNC: 26 MMOL/L (ref 23–29)
CREAT SERPL-MCNC: 0.7 MG/DL (ref 0.5–1.4)
DIFFERENTIAL METHOD BLD: ABNORMAL
EOSINOPHIL # BLD AUTO: 0.1 K/UL (ref 0–0.5)
EOSINOPHIL NFR BLD: 2.1 % (ref 0–8)
ERYTHROCYTE [DISTWIDTH] IN BLOOD BY AUTOMATED COUNT: 17.7 % (ref 11.5–14.5)
EST. GFR  (NO RACE VARIABLE): >60 ML/MIN/1.73 M^2
GLUCOSE SERPL-MCNC: 86 MG/DL (ref 70–110)
HCT VFR BLD AUTO: 35.2 % (ref 40–54)
HGB BLD-MCNC: 11.9 G/DL (ref 14–18)
IMM GRANULOCYTES # BLD AUTO: 0.02 K/UL (ref 0–0.04)
IMM GRANULOCYTES NFR BLD AUTO: 0.5 % (ref 0–0.5)
LYMPHOCYTES # BLD AUTO: 0.9 K/UL (ref 1–4.8)
LYMPHOCYTES NFR BLD: 21.3 % (ref 18–48)
MCH RBC QN AUTO: 30.6 PG (ref 27–31)
MCHC RBC AUTO-ENTMCNC: 33.8 G/DL (ref 32–36)
MCV RBC AUTO: 91 FL (ref 82–98)
MONOCYTES # BLD AUTO: 0.3 K/UL (ref 0.3–1)
MONOCYTES NFR BLD: 7.9 % (ref 4–15)
NEUTROPHILS # BLD AUTO: 2.9 K/UL (ref 1.8–7.7)
NEUTROPHILS NFR BLD: 67 % (ref 38–73)
NRBC BLD-RTO: 0 /100 WBC
PLATELET # BLD AUTO: 208 K/UL (ref 150–450)
PMV BLD AUTO: 9.8 FL (ref 9.2–12.9)
POTASSIUM SERPL-SCNC: 3.9 MMOL/L (ref 3.5–5.1)
PROT SERPL-MCNC: 6.9 G/DL (ref 6–8.4)
RBC # BLD AUTO: 3.89 M/UL (ref 4.6–6.2)
SODIUM SERPL-SCNC: 140 MMOL/L (ref 136–145)
WBC # BLD AUTO: 4.31 K/UL (ref 3.9–12.7)

## 2024-07-26 PROCEDURE — 25000003 PHARM REV CODE 250: Performed by: INTERNAL MEDICINE

## 2024-07-26 PROCEDURE — 99999 PR PBB SHADOW E&M-EST. PATIENT-LVL III: CPT | Mod: PBBFAC,,, | Performed by: NURSE PRACTITIONER

## 2024-07-26 PROCEDURE — 99999 PR PBB SHADOW E&M-EST. PATIENT-LVL III: CPT | Mod: PBBFAC,TXP,,

## 2024-07-26 PROCEDURE — 85025 COMPLETE CBC W/AUTO DIFF WBC: CPT | Performed by: INTERNAL MEDICINE

## 2024-07-26 PROCEDURE — 80053 COMPREHEN METABOLIC PANEL: CPT | Performed by: INTERNAL MEDICINE

## 2024-07-26 PROCEDURE — A4216 STERILE WATER/SALINE, 10 ML: HCPCS | Performed by: INTERNAL MEDICINE

## 2024-07-26 PROCEDURE — 63600175 PHARM REV CODE 636 W HCPCS: Performed by: INTERNAL MEDICINE

## 2024-07-26 PROCEDURE — 36591 DRAW BLOOD OFF VENOUS DEVICE: CPT

## 2024-07-26 RX ORDER — HEPARIN 100 UNIT/ML
500 SYRINGE INTRAVENOUS
Status: DISCONTINUED | OUTPATIENT
Start: 2024-07-26 | End: 2024-07-26 | Stop reason: HOSPADM

## 2024-07-26 RX ORDER — SODIUM CHLORIDE 0.9 % (FLUSH) 0.9 %
10 SYRINGE (ML) INJECTION
Status: DISCONTINUED | OUTPATIENT
Start: 2024-07-26 | End: 2024-07-26 | Stop reason: HOSPADM

## 2024-07-26 RX ORDER — HEPARIN 100 UNIT/ML
SYRINGE INTRAVENOUS
Status: DISPENSED
Start: 2024-07-26 | End: 2024-07-26

## 2024-07-26 RX ADMIN — SODIUM CHLORIDE, PRESERVATIVE FREE 10 ML: 5 INJECTION INTRAVENOUS at 09:07

## 2024-07-26 RX ADMIN — HEPARIN 500 UNITS: 100 SYRINGE at 10:07

## 2024-07-26 NOTE — PROGRESS NOTES
Liver Transplant / Hepatobiliary Surgery  Clinic Note    Referring Provider: Self, Aaareferral     Subjective:     Reason for Visit: Colorectal cancer with liver metastases    History of Present Illness: Yong Arenas is a 44 y.o. male referred for consultation regarding liver transplantation for colorectal liver metastases. He initially presented with blood in his stools in March 2024. Colonoscopy demonstrated rectal malignancy. On cross sectional imaging he had several small lesions in the liver with bilobar distribution concerning for liver metastases. There was no other evidence of extrahepatic metastases on his imaging. He completed 5 cycles of FOLFOX + marleny and is currently completing radiation therapy with 5FU. He is tolerating his current treatment well. He is here today to discuss a potential role for liver transplantation in the future. He is otherwise well and active.    Review of Systems   Constitutional:  Negative for chills and fever.   HENT:  Negative for congestion and tinnitus.    Eyes:  Negative for discharge and redness.   Respiratory:  Negative for cough and shortness of breath.    Cardiovascular:  Negative for chest pain and palpitations.   Gastrointestinal:  Negative for diarrhea and vomiting.   Genitourinary:  Negative for frequency and urgency.   Musculoskeletal:  Negative for joint pain and myalgias.   Neurological:  Negative for tingling and weakness.   Endo/Heme/Allergies:  Does not bruise/bleed easily.   Psychiatric/Behavioral:  Negative for depression and suicidal ideas.         Objective:     Physical Exam  HENT:      Head: Normocephalic and atraumatic.   Eyes:      Pupils: Pupils are equal, round, and reactive to light.   Cardiovascular:      Rate and Rhythm: Normal rate and regular rhythm.   Pulmonary:      Effort: Pulmonary effort is normal. No respiratory distress.   Abdominal:      Palpations: Abdomen is soft.      Tenderness: There is no guarding.   Musculoskeletal:          General: No tenderness. Normal range of motion.      Cervical back: Normal range of motion.   Lymphadenopathy:      Cervical: No cervical adenopathy.   Skin:     General: Skin is warm and dry.   Neurological:      Mental Status: He is alert and oriented to person, place, and time.      Cranial Nerves: No cranial nerve deficit.   Psychiatric:         Mood and Affect: Affect normal.         Judgment: Judgment normal.            MELD-Na score: Computed MELD 3.0 unavailable. One or more values for this score either were not found within the given timeframe or did not fit some other criterion.  Computed MELD-Na unavailable. One or more values for this score either were not found within the given timeframe or did not fit some other criterion.       Sodium   Date Value Ref Range Status   07/26/2024 140 136 - 145 mmol/L Final     Potassium   Date Value Ref Range Status   07/26/2024 3.9 3.5 - 5.1 mmol/L Final     Chloride   Date Value Ref Range Status   07/26/2024 107 95 - 110 mmol/L Final     CO2   Date Value Ref Range Status   07/26/2024 26 23 - 29 mmol/L Final     Glucose   Date Value Ref Range Status   07/26/2024 86 70 - 110 mg/dL Final     BUN   Date Value Ref Range Status   07/26/2024 11 6 - 20 mg/dL Final     Creatinine   Date Value Ref Range Status   07/26/2024 0.7 0.5 - 1.4 mg/dL Final     Calcium   Date Value Ref Range Status   07/26/2024 8.9 8.7 - 10.5 mg/dL Final     Total Protein   Date Value Ref Range Status   07/26/2024 6.9 6.0 - 8.4 g/dL Final     Albumin   Date Value Ref Range Status   07/26/2024 4.3 3.5 - 5.2 g/dL Final     Total Bilirubin   Date Value Ref Range Status   07/26/2024 0.5 0.1 - 1.0 mg/dL Final     Comment:     For infants and newborns, interpretation of results should be based  on gestational age, weight and in agreement with clinical  observations.    Premature Infant recommended reference ranges:  Up to 24 hours.............<8.0 mg/dL  Up to 48 hours............<12.0 mg/dL  3-5  days..................<15.0 mg/dL  6-29 days.................<15.0 mg/dL       Alkaline Phosphatase   Date Value Ref Range Status   07/26/2024 69 55 - 135 U/L Final     AST   Date Value Ref Range Status   07/26/2024 23 10 - 40 U/L Final     ALT   Date Value Ref Range Status   07/26/2024 31 10 - 44 U/L Final     Anion Gap   Date Value Ref Range Status   07/26/2024 7 (L) 8 - 16 mmol/L Final     Lab Results   Component Value Date    WBC 4.31 07/26/2024    HGB 11.9 (L) 07/26/2024    HCT 35.2 (L) 07/26/2024    MCV 91 07/26/2024     07/26/2024       Lab Results   Component Value Date    INR 0.9 04/15/2024    INR 0.9 04/12/2024       Oncology History   Metastasis to liver   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 I explained the indications for and path to liver transplantation. I emphasized that the metastatic disease needs to be confined to the liver, the primary tumor has to be resected and he needs to be stable for at least 1 year from time of diagnosis. I explained the risks and benefits of liver transplant for patients in his situation.     At our last colorectal conference review, transplant was determined to be a potential treatment option for him. We will discuss his case after he completes staging imaging upon completion of radiation treatment. If (1) his primary tumor can be resected (2) his liver metastases are stable and cannot be cleared with surgical resection or ablation and (3) he has no evidence of tumor spread outside of his liver, then I would recommend proceeding with transplant evaluation. If determined to be a suitable transplant candidate, we would continue discussion of the role for  living donation.     I will plan to follow up with him regarding transplant evaluation after our next CRC conference discussion.      Calos Pulido MD  Liver Transplant / Hepatobiliary Surgery  [facility]

## 2024-07-26 NOTE — PROGRESS NOTES
[facility] HEMATOLGY ONCOLOGY CONSULTATION    Subjective:       Patient ID: Yong Arenas is a 44 y.o. male returning today for a regularly scheduled follow-up visit.    Chief Complaint: Rectal Cancer with Mets to the liver       HPI  He is here by himself today.   He is on end of week 2 of 5fu and XRT - he has about 4 more weeks of XRT due.   He then is due to finish out his systemic therapy.   He is not having any more rectal bleeding       Past Medical History:   Diagnosis Date    Back pain     Cancer     Cervical pain     GERD (gastroesophageal reflux disease)     HLD (hyperlipidemia)     Hypertension     Tinnitus, unspecified ear        Past Surgical History:   Procedure Laterality Date    EGD, WITH BALLOON DILATION OF LESS THAN 30 MILLIMETERS      INSERTION OF TUNNELED CENTRAL VENOUS CATHETER (CVC) WITH SUBCUTANEOUS PORT Left 2024    Procedure: INSERTION, PORT-A-CATH;  Surgeon: Tash Castellanos MD;  Location: [facility] OR;  Service: General;  Laterality: Left;  port placed to right chest , tunneled over to left chest, subclavian    WISDOM TOOTH EXTRACTION      x2       Social History     Socioeconomic History    Marital status:    Tobacco Use    Smoking status: Former     Types: Cigarettes     Start date:      Quit date:      Years since quittin.5    Smokeless tobacco: Never   Substance and Sexual Activity    Alcohol use: Not Currently     Comment: social    Drug use: Never       Family History   Problem Relation Name Age of Onset    Cancer Mother          liposarcoma on leg    Prostate cancer Father      Colon polyps Father      Leukemia Niece         Review of patient's allergies indicates:  No Known Allergies    Current Outpatient Medications:     amLODIPine (NORVASC) 2.5 MG tablet, Take 1 tablet by mouth once daily., Disp: , Rfl:     amoxicillin-clavulanate 875-125mg (AUGMENTIN) 875-125 mg per tablet, Take 1 tablet by mouth every 12 (twelve) hours., Disp: 10 tablet, Rfl: 0    duke's  soln (benadryl 30 mL, mylanta 30 mL, LIDOcaine 30 mL, nystatin 30 mL) 120mL, Take 10 mLs by mouth 4 (four) times daily., Disp: 120 mL, Rfl: 0    hydrALAZINE (APRESOLINE) 25 MG tablet, Take 1 tablet (25 mg total) by mouth 3 (three) times daily as needed (Severely elevated blood pressure above 190/100)., Disp: 30 tablet, Rfl: 0    HYDROcodone-acetaminophen (NORCO) 5-325 mg per tablet, Take 1 tablet by mouth every 8 (eight) hours as needed for Pain., Disp: 15 tablet, Rfl: 0    ibuprofen (ADVIL,MOTRIN) 200 MG tablet, Take 200 mg by mouth every 6 (six) hours as needed for Pain., Disp: , Rfl:     loratadine-pseudoephedrine 5-120 mg (CLARITIN-D 12 HOUR) 5-120 mg per tablet, Take by mouth once daily., Disp: , Rfl:     losartan (COZAAR) 100 MG tablet, Take 1 tablet by mouth once daily., Disp: , Rfl:     omeprazole (PRILOSEC) 40 MG capsule, Take 1 capsule by mouth daily as needed., Disp: , Rfl:     ondansetron (ZOFRAN) 8 MG tablet, Take 1 tablet (8 mg total) by mouth every 8 (eight) hours as needed for Nausea., Disp: 30 tablet, Rfl: 2    promethazine (PHENERGAN) 25 MG tablet, Take 1 tablet (25 mg total) by mouth every 4 to 6 hours as needed for Nausea., Disp: 30 tablet, Rfl: 2    testosterone cypionate (DEPOTESTOTERONE CYPIONATE) 200 mg/mL injection, Inject 1 mL into the muscle every 14 (fourteen) days., Disp: , Rfl:     All medications and past history have been reviewed.    Review of Systems   Constitutional:  Negative for activity change, appetite change, fatigue and fever.   HENT:  Negative for congestion, mouth sores, postnasal drip, rhinorrhea, sinus pressure, sore throat and trouble swallowing.    Eyes:  Negative for photophobia and visual disturbance.   Respiratory:  Negative for cough, chest tightness, shortness of breath and wheezing.    Cardiovascular:  Negative for chest pain and leg swelling.   Gastrointestinal:  Negative for abdominal distention, abdominal pain, constipation, diarrhea, nausea and vomiting.  "  Endocrine: Negative for cold intolerance.   Genitourinary:  Negative for decreased urine volume, dysuria and frequency.   Musculoskeletal:  Negative for arthralgias and myalgias.   Skin:  Negative for pallor and rash.   Allergic/Immunologic: Negative for immunocompromised state.   Neurological:  Negative for dizziness, syncope, weakness, light-headedness, numbness and headaches.   Hematological:  Negative for adenopathy. Does not bruise/bleed easily.   Psychiatric/Behavioral:  Negative for sleep disturbance. The patient is not nervous/anxious.        Objective:        /87 (BP Location: Right arm)   Pulse 69   Temp 97.4 °F (36.3 °C)   Resp 17   Ht 6' 3" (1.905 m)   Wt 102.1 kg (225 lb 1.6 oz)   BMI 28.14 kg/m²     Physical Exam  Constitutional:       Appearance: Normal appearance.   HENT:      Head: Normocephalic and atraumatic.      Mouth/Throat:      Mouth: Mucous membranes are moist.   Cardiovascular:      Rate and Rhythm: Normal rate and regular rhythm.      Pulses: Normal pulses.      Heart sounds: Normal heart sounds.   Pulmonary:      Effort: Pulmonary effort is normal. No respiratory distress.      Breath sounds: Normal breath sounds. No wheezing.   Abdominal:      General: There is no distension.      Palpations: Abdomen is soft. There is no mass.      Tenderness: There is no abdominal tenderness.   Musculoskeletal:         General: No swelling. Normal range of motion.      Right lower leg: No edema.      Left lower leg: No edema.   Skin:     General: Skin is warm and dry.      Capillary Refill: Capillary refill takes 2 to 3 seconds.      Findings: No bruising or rash.   Neurological:      Mental Status: He is alert and oriented to person, place, and time. Mental status is at baseline.      Motor: No weakness.   Psychiatric:         Mood and Affect: Mood normal.         Behavior: Behavior normal.           Lab:  Recent Results (from the past 336 hour(s))   CBC w/ DIFF    Collection Time: " 07/19/24  9:46 AM   Result Value Ref Range    WBC 5.12 3.90 - 12.70 K/uL    Hemoglobin 12.6 (L) 14.0 - 18.0 g/dL    Hematocrit 37.5 (L) 40.0 - 54.0 %    Platelets 164 150 - 450 K/uL     CMP  Sodium   Date Value Ref Range Status   07/19/2024 138 136 - 145 mmol/L Final     Potassium   Date Value Ref Range Status   07/19/2024 4.0 3.5 - 5.1 mmol/L Final     Chloride   Date Value Ref Range Status   07/19/2024 104 95 - 110 mmol/L Final     CO2   Date Value Ref Range Status   07/19/2024 25 23 - 29 mmol/L Final     Glucose   Date Value Ref Range Status   07/19/2024 86 70 - 110 mg/dL Final     BUN   Date Value Ref Range Status   07/19/2024 11 6 - 20 mg/dL Final     Creatinine   Date Value Ref Range Status   07/19/2024 0.9 0.5 - 1.4 mg/dL Final     Calcium   Date Value Ref Range Status   07/19/2024 9.1 8.7 - 10.5 mg/dL Final     Total Protein   Date Value Ref Range Status   07/19/2024 6.9 6.0 - 8.4 g/dL Final     Albumin   Date Value Ref Range Status   07/19/2024 4.1 3.5 - 5.2 g/dL Final     Total Bilirubin   Date Value Ref Range Status   07/19/2024 0.5 0.1 - 1.0 mg/dL Final     Comment:     For infants and newborns, interpretation of results should be based  on gestational age, weight and in agreement with clinical  observations.    Premature Infant recommended reference ranges:  Up to 24 hours.............<8.0 mg/dL  Up to 48 hours............<12.0 mg/dL  3-5 days..................<15.0 mg/dL  6-29 days.................<15.0 mg/dL       Alkaline Phosphatase   Date Value Ref Range Status   07/19/2024 77 55 - 135 U/L Final     AST   Date Value Ref Range Status   07/19/2024 25 10 - 40 U/L Final     ALT   Date Value Ref Range Status   07/19/2024 34 10 - 44 U/L Final     Anion Gap   Date Value Ref Range Status   07/19/2024 9 8 - 16 mmol/L Final         Specimen (24h ago, onward)      None              Radiology/Diagnostic Studies:  Results for orders placed or performed during the hospital encounter of 06/26/24 (from the past  2160 hour(s))   NM PET CT FDG Skull Base to Mid Thigh    Impression    1. Resolution of 2 previously FDG avid foci in the liver suggestive of positive therapy response.  2. Slight decreased soft tissue thickening in the known rectal lesion compared to the prior study.      Electronically signed by: Sreekanth Park MD  Date:    06/26/2024  Time:    09:41     Results for orders placed or performed during the hospital encounter of 06/10/24 (from the past 2160 hour(s))   CT Head Without Contrast    Narrative    EXAMINATION:  CT HEAD WITHOUT CONTRAST    CLINICAL HISTORY:  Headache, sudden, severe;.    COMPARISON:  None.    FINDINGS:  There is no evidence of intracranial mass, hemorrhage, or midline shift.  The ventricles and sulci are within normal limits.  There are no pathologic extra-axial fluid collections.    There is no evidence of ischemic change or edema.  Cerebellum and brainstem are unremarkable.    The calvarium is intact.  Mucous retention cysts or polyps are noted in both maxillary sinuses, the largest on the right measuring 3.2 cm.      Impression    1. Normal CT appearance of the brain.      Electronically signed by: Warren Jo  Date:    06/10/2024  Time:    15:18         All lab results and imaging results have been reviewed and discussed with the patient.   Assessment/Plan:           1. Rectal adenocarcinoma metastatic to liver    2. Chemotherapy induced nausea and vomiting    3. Chemotherapy induced neutropenia      Rectal adenocarcinoma metastatic to liver    Chemotherapy induced nausea and vomiting    Chemotherapy induced neutropenia       Cancer Staging   Rectal adenocarcinoma metastatic to liver  Staging form: Colon and Rectum, AJCC 8th Edition  - Clinical stage from 4/22/2024: Stage IRINEO (cN1a, cM1a) - Signed by Nii Rudd Jr., MD on 4/26/2024    PLAN:   Continue 5FU and XRT - 4 more weeks  Doing well, labs stable   Case was presented at tumor board will finish 6-8 cycles of Folfox and  then restage   He sees the liver transplant doc today         Follow up in about 2 weeks (around 8/9/2024) for with me .     I have explained and the patient understands all of  the current recommendation(s). I have answered all of their questions to the best of my ability and to their complete satisfaction.   The patient is to continue with the current management plan.            Electronically signed by: Suzy Funes, NILA, APRN, AGNP-C

## 2024-07-26 NOTE — LETTER
July 26, 2024        Nii Rudd Jr., MD  [address]  [address]  [facility] [address]             [facility] [address]st [address]  [address]  [facility] [address]  Phone: [phone]   Patient: Yong Arenas   MR Number: [id number]   YOB: 1980   Date of Visit: 7/26/2024       Dear Dr. Rudd:    Thank you for referring Yong Arenas to me for evaluation. Attached you will find relevant portions of my assessment and plan of care.    If you have questions, please do not hesitate to call me. I look forward to following Yong Arenas along with you.    Sincerely,      Calos Pulido MD              MD ELMO Morse Jr., MD Brian R. Kann, MD Humberto E. Bohorquez, MD Enclosure

## 2024-07-28 ENCOUNTER — TELEPHONE (OUTPATIENT)
Facility: CLINIC | Age: 44
End: 2024-07-28
Payer: COMMERCIAL

## 2024-07-28 RX ORDER — SODIUM CHLORIDE 0.9 % (FLUSH) 0.9 %
10 SYRINGE (ML) INJECTION
Status: CANCELLED | OUTPATIENT
Start: 2024-07-29

## 2024-07-28 RX ORDER — HEPARIN 100 UNIT/ML
500 SYRINGE INTRAVENOUS
Status: CANCELLED | OUTPATIENT
Start: 2024-07-29

## 2024-07-28 RX ORDER — DIPHENHYDRAMINE HYDROCHLORIDE 50 MG/ML
50 INJECTION INTRAMUSCULAR; INTRAVENOUS ONCE AS NEEDED
Status: CANCELLED | OUTPATIENT
Start: 2024-07-29

## 2024-07-28 RX ORDER — SODIUM CHLORIDE 0.9 % (FLUSH) 0.9 %
10 SYRINGE (ML) INJECTION
Status: CANCELLED | OUTPATIENT
Start: 2024-08-02

## 2024-07-28 RX ORDER — HEPARIN 100 UNIT/ML
500 SYRINGE INTRAVENOUS
Status: CANCELLED | OUTPATIENT
Start: 2024-08-02

## 2024-07-28 RX ORDER — EPINEPHRINE 0.3 MG/.3ML
0.3 INJECTION SUBCUTANEOUS ONCE AS NEEDED
Status: CANCELLED | OUTPATIENT
Start: 2024-07-29

## 2024-07-28 RX ORDER — ONDANSETRON HYDROCHLORIDE 2 MG/ML
16 INJECTION, SOLUTION INTRAVENOUS ONCE
Status: CANCELLED | OUTPATIENT
Start: 2024-07-29

## 2024-07-29 ENCOUNTER — INFUSION (OUTPATIENT)
Dept: INFUSION THERAPY | Facility: HOSPITAL | Age: 44
End: 2024-07-29
Attending: INTERNAL MEDICINE
Payer: COMMERCIAL

## 2024-07-29 VITALS
WEIGHT: 223 LBS | DIASTOLIC BLOOD PRESSURE: 92 MMHG | TEMPERATURE: 99 F | HEIGHT: 75 IN | OXYGEN SATURATION: 97 % | RESPIRATION RATE: 18 BRPM | HEART RATE: 80 BPM | BODY MASS INDEX: 27.73 KG/M2 | SYSTOLIC BLOOD PRESSURE: 144 MMHG

## 2024-07-29 DIAGNOSIS — E86.0 DEHYDRATION: ICD-10-CM

## 2024-07-29 DIAGNOSIS — C20 RECTAL ADENOCARCINOMA METASTATIC TO LIVER: ICD-10-CM

## 2024-07-29 DIAGNOSIS — T45.1X5A CHEMOTHERAPY INDUCED NEUTROPENIA: Primary | ICD-10-CM

## 2024-07-29 DIAGNOSIS — C78.7 METASTASIS TO LIVER: ICD-10-CM

## 2024-07-29 DIAGNOSIS — C78.7 RECTAL ADENOCARCINOMA METASTATIC TO LIVER: ICD-10-CM

## 2024-07-29 DIAGNOSIS — D70.1 CHEMOTHERAPY INDUCED NEUTROPENIA: Primary | ICD-10-CM

## 2024-07-29 PROCEDURE — 96365 THER/PROPH/DIAG IV INF INIT: CPT

## 2024-07-29 PROCEDURE — 25000003 PHARM REV CODE 250: Performed by: INTERNAL MEDICINE

## 2024-07-29 PROCEDURE — 96416 CHEMO PROLONG INFUSE W/PUMP: CPT

## 2024-07-29 PROCEDURE — 63600175 PHARM REV CODE 636 W HCPCS: Performed by: INTERNAL MEDICINE

## 2024-07-29 RX ORDER — DIPHENHYDRAMINE HYDROCHLORIDE 50 MG/ML
50 INJECTION INTRAMUSCULAR; INTRAVENOUS ONCE AS NEEDED
Status: DISCONTINUED | OUTPATIENT
Start: 2024-07-29 | End: 2024-07-29 | Stop reason: HOSPADM

## 2024-07-29 RX ORDER — SODIUM CHLORIDE 0.9 % (FLUSH) 0.9 %
10 SYRINGE (ML) INJECTION
Status: DISCONTINUED | OUTPATIENT
Start: 2024-07-29 | End: 2024-07-29 | Stop reason: HOSPADM

## 2024-07-29 RX ORDER — EPINEPHRINE 0.3 MG/.3ML
0.3 INJECTION SUBCUTANEOUS ONCE AS NEEDED
Status: DISCONTINUED | OUTPATIENT
Start: 2024-07-29 | End: 2024-07-29 | Stop reason: HOSPADM

## 2024-07-29 RX ADMIN — FLUOROURACIL 2080 MG: 50 INJECTION, SOLUTION INTRAVENOUS at 10:07

## 2024-07-29 RX ADMIN — ONDANSETRON HYDROCHLORIDE 16 MG: 2 INJECTION, SOLUTION INTRAMUSCULAR; INTRAVENOUS at 09:07

## 2024-07-29 NOTE — PLAN OF CARE
Problem: Fatigue  Goal: Improved Activity Tolerance  Outcome: Progressing  Intervention: Promote Improved Energy  Flowsheets (Taken 7/29/2024 0856)  Fatigue Management:   fatigue-related activity identified   paced activity encouraged   frequent rest breaks encouraged  Sleep/Rest Enhancement:   noise level reduced   relaxation techniques promoted   regular sleep/rest pattern promoted  Activity Management:   Ambulated -L4   Up in chair - L3  Environmental Support:   calm environment promoted   distractions minimized   rest periods encouraged     Problem: Fatigue  Goal: Improved Activity Tolerance  Outcome: Progressing  Intervention: Promote Improved Energy  Flowsheets (Taken 7/29/2024 0856)  Fatigue Management:   fatigue-related activity identified   paced activity encouraged   frequent rest breaks encouraged  Sleep/Rest Enhancement:   noise level reduced   relaxation techniques promoted   regular sleep/rest pattern promoted  Activity Management:   Ambulated -L4   Up in chair - L3  Environmental Support:   calm environment promoted   distractions minimized   rest periods encouraged

## 2024-07-30 ENCOUNTER — TREATMENT (OUTPATIENT)
Dept: RADIATION ONCOLOGY | Facility: CLINIC | Age: 44
End: 2024-07-30
Payer: COMMERCIAL

## 2024-07-30 PROCEDURE — G6015 RADIATION TX DELIVERY IMRT: HCPCS | Mod: S$GLB,,, | Performed by: RADIOLOGY

## 2024-07-30 PROCEDURE — 77014 PR  CT GUIDANCE PLACEMENT RAD THERAPY FIELDS: CPT | Mod: S$GLB,,, | Performed by: RADIOLOGY

## 2024-08-02 ENCOUNTER — INFUSION (OUTPATIENT)
Dept: INFUSION THERAPY | Facility: HOSPITAL | Age: 44
End: 2024-08-02
Attending: INTERNAL MEDICINE
Payer: COMMERCIAL

## 2024-08-02 VITALS
TEMPERATURE: 98 F | DIASTOLIC BLOOD PRESSURE: 90 MMHG | OXYGEN SATURATION: 100 % | WEIGHT: 222.38 LBS | SYSTOLIC BLOOD PRESSURE: 145 MMHG | BODY MASS INDEX: 27.65 KG/M2 | HEART RATE: 72 BPM | RESPIRATION RATE: 18 BRPM | HEIGHT: 75 IN

## 2024-08-02 DIAGNOSIS — C20 ADENOCARCINOMA OF RECTUM: ICD-10-CM

## 2024-08-02 DIAGNOSIS — C78.7 METASTASIS TO LIVER: Primary | ICD-10-CM

## 2024-08-02 DIAGNOSIS — T45.1X5A CHEMOTHERAPY INDUCED NEUTROPENIA: ICD-10-CM

## 2024-08-02 DIAGNOSIS — C78.7 RECTAL ADENOCARCINOMA METASTATIC TO LIVER: ICD-10-CM

## 2024-08-02 DIAGNOSIS — C20 RECTAL ADENOCARCINOMA METASTATIC TO LIVER: ICD-10-CM

## 2024-08-02 DIAGNOSIS — D70.1 CHEMOTHERAPY INDUCED NEUTROPENIA: ICD-10-CM

## 2024-08-02 DIAGNOSIS — C20 RECTAL CANCER METASTASIZED TO LIVER: ICD-10-CM

## 2024-08-02 DIAGNOSIS — C78.7 RECTAL CANCER METASTASIZED TO LIVER: ICD-10-CM

## 2024-08-02 DIAGNOSIS — E86.0 DEHYDRATION: ICD-10-CM

## 2024-08-02 LAB
ALBUMIN SERPL BCP-MCNC: 4.1 G/DL (ref 3.5–5.2)
ALP SERPL-CCNC: 75 U/L (ref 55–135)
ALT SERPL W/O P-5'-P-CCNC: 22 U/L (ref 10–44)
ANION GAP SERPL CALC-SCNC: 9 MMOL/L (ref 8–16)
AST SERPL-CCNC: 18 U/L (ref 10–40)
BASOPHILS # BLD AUTO: 0.04 K/UL (ref 0–0.2)
BASOPHILS NFR BLD: 1 % (ref 0–1.9)
BILIRUB SERPL-MCNC: 0.5 MG/DL (ref 0.1–1)
BUN SERPL-MCNC: 10 MG/DL (ref 6–20)
CALCIUM SERPL-MCNC: 9 MG/DL (ref 8.7–10.5)
CHLORIDE SERPL-SCNC: 106 MMOL/L (ref 95–110)
CO2 SERPL-SCNC: 25 MMOL/L (ref 23–29)
CREAT SERPL-MCNC: 0.9 MG/DL (ref 0.5–1.4)
DIFFERENTIAL METHOD BLD: ABNORMAL
EOSINOPHIL # BLD AUTO: 0.3 K/UL (ref 0–0.5)
EOSINOPHIL NFR BLD: 7.1 % (ref 0–8)
ERYTHROCYTE [DISTWIDTH] IN BLOOD BY AUTOMATED COUNT: 17.2 % (ref 11.5–14.5)
EST. GFR  (NO RACE VARIABLE): >60 ML/MIN/1.73 M^2
GLUCOSE SERPL-MCNC: 93 MG/DL (ref 70–110)
HCT VFR BLD AUTO: 35 % (ref 40–54)
HGB BLD-MCNC: 11.9 G/DL (ref 14–18)
IMM GRANULOCYTES # BLD AUTO: 0.01 K/UL (ref 0–0.04)
IMM GRANULOCYTES NFR BLD AUTO: 0.3 % (ref 0–0.5)
LYMPHOCYTES # BLD AUTO: 0.6 K/UL (ref 1–4.8)
LYMPHOCYTES NFR BLD: 16.2 % (ref 18–48)
MCH RBC QN AUTO: 30.7 PG (ref 27–31)
MCHC RBC AUTO-ENTMCNC: 34 G/DL (ref 32–36)
MCV RBC AUTO: 90 FL (ref 82–98)
MONOCYTES # BLD AUTO: 0.3 K/UL (ref 0.3–1)
MONOCYTES NFR BLD: 8.4 % (ref 4–15)
NEUTROPHILS # BLD AUTO: 2.6 K/UL (ref 1.8–7.7)
NEUTROPHILS NFR BLD: 67 % (ref 38–73)
NRBC BLD-RTO: 0 /100 WBC
PLATELET # BLD AUTO: 195 K/UL (ref 150–450)
PMV BLD AUTO: 10 FL (ref 9.2–12.9)
POTASSIUM SERPL-SCNC: 3.9 MMOL/L (ref 3.5–5.1)
PROT SERPL-MCNC: 7.2 G/DL (ref 6–8.4)
RBC # BLD AUTO: 3.87 M/UL (ref 4.6–6.2)
SODIUM SERPL-SCNC: 140 MMOL/L (ref 136–145)
WBC # BLD AUTO: 3.94 K/UL (ref 3.9–12.7)

## 2024-08-02 PROCEDURE — 63600175 PHARM REV CODE 636 W HCPCS: Performed by: INTERNAL MEDICINE

## 2024-08-02 PROCEDURE — 25000003 PHARM REV CODE 250: Performed by: INTERNAL MEDICINE

## 2024-08-02 PROCEDURE — 85025 COMPLETE CBC W/AUTO DIFF WBC: CPT | Performed by: INTERNAL MEDICINE

## 2024-08-02 PROCEDURE — 80053 COMPREHEN METABOLIC PANEL: CPT | Performed by: INTERNAL MEDICINE

## 2024-08-02 PROCEDURE — A4216 STERILE WATER/SALINE, 10 ML: HCPCS | Performed by: INTERNAL MEDICINE

## 2024-08-02 PROCEDURE — 36591 DRAW BLOOD OFF VENOUS DEVICE: CPT

## 2024-08-02 RX ORDER — SODIUM CHLORIDE 0.9 % (FLUSH) 0.9 %
10 SYRINGE (ML) INJECTION
Status: DISCONTINUED | OUTPATIENT
Start: 2024-08-02 | End: 2024-08-02 | Stop reason: HOSPADM

## 2024-08-02 RX ORDER — SODIUM CHLORIDE 0.9 % (FLUSH) 0.9 %
10 SYRINGE (ML) INJECTION
OUTPATIENT
Start: 2024-08-02

## 2024-08-02 RX ORDER — HEPARIN 100 UNIT/ML
500 SYRINGE INTRAVENOUS
OUTPATIENT
Start: 2024-08-02

## 2024-08-02 RX ORDER — HEPARIN 100 UNIT/ML
500 SYRINGE INTRAVENOUS
Status: DISCONTINUED | OUTPATIENT
Start: 2024-08-02 | End: 2024-08-02 | Stop reason: HOSPADM

## 2024-08-02 RX ADMIN — HEPARIN 500 UNITS: 100 SYRINGE at 10:08

## 2024-08-02 RX ADMIN — SODIUM CHLORIDE, PRESERVATIVE FREE 10 ML: 5 INJECTION INTRAVENOUS at 10:08

## 2024-08-02 NOTE — PLAN OF CARE
Problem: Fatigue  Goal: Improved Activity Tolerance  Outcome: Progressing  Intervention: Promote Improved Energy  Flowsheets (Taken 8/2/2024 1012)  Sleep/Rest Enhancement: relaxation techniques promoted  Activity Management: Ambulated -L4  Environmental Support: rest periods encouraged

## 2024-08-06 ENCOUNTER — TREATMENT (OUTPATIENT)
Dept: RADIATION ONCOLOGY | Facility: CLINIC | Age: 44
End: 2024-08-06
Payer: COMMERCIAL

## 2024-08-06 ENCOUNTER — INFUSION (OUTPATIENT)
Dept: INFUSION THERAPY | Facility: HOSPITAL | Age: 44
End: 2024-08-06
Attending: INTERNAL MEDICINE
Payer: COMMERCIAL

## 2024-08-06 VITALS
BODY MASS INDEX: 27.35 KG/M2 | OXYGEN SATURATION: 97 % | TEMPERATURE: 98 F | WEIGHT: 220 LBS | HEART RATE: 87 BPM | RESPIRATION RATE: 17 BRPM | DIASTOLIC BLOOD PRESSURE: 78 MMHG | SYSTOLIC BLOOD PRESSURE: 149 MMHG | HEIGHT: 75 IN

## 2024-08-06 DIAGNOSIS — E86.0 DEHYDRATION: ICD-10-CM

## 2024-08-06 DIAGNOSIS — C78.7 METASTASIS TO LIVER: ICD-10-CM

## 2024-08-06 DIAGNOSIS — C20 RECTAL ADENOCARCINOMA METASTATIC TO LIVER: ICD-10-CM

## 2024-08-06 DIAGNOSIS — C78.7 RECTAL ADENOCARCINOMA METASTATIC TO LIVER: ICD-10-CM

## 2024-08-06 DIAGNOSIS — D70.1 CHEMOTHERAPY INDUCED NEUTROPENIA: Primary | ICD-10-CM

## 2024-08-06 DIAGNOSIS — T45.1X5A CHEMOTHERAPY INDUCED NEUTROPENIA: Primary | ICD-10-CM

## 2024-08-06 PROCEDURE — G6015 RADIATION TX DELIVERY IMRT: HCPCS | Mod: S$GLB,,, | Performed by: RADIOLOGY

## 2024-08-06 PROCEDURE — 25000003 PHARM REV CODE 250: Performed by: INTERNAL MEDICINE

## 2024-08-06 PROCEDURE — 63600175 PHARM REV CODE 636 W HCPCS: Performed by: INTERNAL MEDICINE

## 2024-08-06 PROCEDURE — 77014 PR  CT GUIDANCE PLACEMENT RAD THERAPY FIELDS: CPT | Mod: S$GLB,,, | Performed by: RADIOLOGY

## 2024-08-06 PROCEDURE — 96416 CHEMO PROLONG INFUSE W/PUMP: CPT

## 2024-08-06 RX ORDER — DIPHENHYDRAMINE HYDROCHLORIDE 50 MG/ML
50 INJECTION INTRAMUSCULAR; INTRAVENOUS ONCE AS NEEDED
Status: DISCONTINUED | OUTPATIENT
Start: 2024-08-06 | End: 2024-08-06 | Stop reason: HOSPADM

## 2024-08-06 RX ORDER — EPINEPHRINE 0.3 MG/.3ML
0.3 INJECTION SUBCUTANEOUS ONCE AS NEEDED
Status: DISCONTINUED | OUTPATIENT
Start: 2024-08-06 | End: 2024-08-06 | Stop reason: HOSPADM

## 2024-08-06 RX ADMIN — FLUOROURACIL 2080 MG: 50 INJECTION, SOLUTION INTRAVENOUS at 02:08

## 2024-08-09 ENCOUNTER — INFUSION (OUTPATIENT)
Dept: INFUSION THERAPY | Facility: HOSPITAL | Age: 44
End: 2024-08-09
Attending: INTERNAL MEDICINE
Payer: COMMERCIAL

## 2024-08-09 ENCOUNTER — OFFICE VISIT (OUTPATIENT)
Facility: CLINIC | Age: 44
End: 2024-08-09
Payer: COMMERCIAL

## 2024-08-09 VITALS
DIASTOLIC BLOOD PRESSURE: 98 MMHG | SYSTOLIC BLOOD PRESSURE: 146 MMHG | OXYGEN SATURATION: 98 % | RESPIRATION RATE: 18 BRPM | WEIGHT: 240.69 LBS | HEART RATE: 99 BPM | BODY MASS INDEX: 29.93 KG/M2 | HEIGHT: 75 IN | TEMPERATURE: 97 F

## 2024-08-09 VITALS
SYSTOLIC BLOOD PRESSURE: 147 MMHG | BODY MASS INDEX: 27.57 KG/M2 | RESPIRATION RATE: 17 BRPM | DIASTOLIC BLOOD PRESSURE: 87 MMHG | TEMPERATURE: 98 F | HEART RATE: 85 BPM | WEIGHT: 221.69 LBS | HEIGHT: 75 IN

## 2024-08-09 DIAGNOSIS — E86.0 DEHYDRATION: ICD-10-CM

## 2024-08-09 DIAGNOSIS — C20 ADENOCARCINOMA OF RECTUM: ICD-10-CM

## 2024-08-09 DIAGNOSIS — D70.1 CHEMOTHERAPY INDUCED NEUTROPENIA: Primary | ICD-10-CM

## 2024-08-09 DIAGNOSIS — C78.7 RECTAL ADENOCARCINOMA METASTATIC TO LIVER: Primary | ICD-10-CM

## 2024-08-09 DIAGNOSIS — C78.7 RECTAL ADENOCARCINOMA METASTATIC TO LIVER: ICD-10-CM

## 2024-08-09 DIAGNOSIS — C20 RECTAL ADENOCARCINOMA METASTATIC TO LIVER: Primary | ICD-10-CM

## 2024-08-09 DIAGNOSIS — C78.7 RECTAL CANCER METASTASIZED TO LIVER: ICD-10-CM

## 2024-08-09 DIAGNOSIS — C20 RECTAL ADENOCARCINOMA METASTATIC TO LIVER: ICD-10-CM

## 2024-08-09 DIAGNOSIS — C78.7 METASTASIS TO LIVER: ICD-10-CM

## 2024-08-09 DIAGNOSIS — C20 RECTAL CANCER METASTASIZED TO LIVER: ICD-10-CM

## 2024-08-09 DIAGNOSIS — T45.1X5A CHEMOTHERAPY INDUCED NEUTROPENIA: Primary | ICD-10-CM

## 2024-08-09 LAB
ALBUMIN SERPL BCP-MCNC: 4.2 G/DL (ref 3.5–5.2)
ALP SERPL-CCNC: 67 U/L (ref 55–135)
ALT SERPL W/O P-5'-P-CCNC: 24 U/L (ref 10–44)
ANION GAP SERPL CALC-SCNC: 6 MMOL/L (ref 8–16)
AST SERPL-CCNC: 19 U/L (ref 10–40)
BASOPHILS # BLD AUTO: 0.03 K/UL (ref 0–0.2)
BASOPHILS NFR BLD: 0.7 % (ref 0–1.9)
BILIRUB SERPL-MCNC: 0.4 MG/DL (ref 0.1–1)
BUN SERPL-MCNC: 12 MG/DL (ref 6–20)
CALCIUM SERPL-MCNC: 9.1 MG/DL (ref 8.7–10.5)
CEA SERPL-MCNC: 5.2 NG/ML (ref 0–5)
CHLORIDE SERPL-SCNC: 106 MMOL/L (ref 95–110)
CO2 SERPL-SCNC: 26 MMOL/L (ref 23–29)
CREAT SERPL-MCNC: 0.8 MG/DL (ref 0.5–1.4)
DIFFERENTIAL METHOD BLD: ABNORMAL
EOSINOPHIL # BLD AUTO: 0.4 K/UL (ref 0–0.5)
EOSINOPHIL NFR BLD: 9.8 % (ref 0–8)
ERYTHROCYTE [DISTWIDTH] IN BLOOD BY AUTOMATED COUNT: 16.5 % (ref 11.5–14.5)
EST. GFR  (NO RACE VARIABLE): >60 ML/MIN/1.73 M^2
GLUCOSE SERPL-MCNC: 104 MG/DL (ref 70–110)
HCT VFR BLD AUTO: 35.3 % (ref 40–54)
HGB BLD-MCNC: 12 G/DL (ref 14–18)
IMM GRANULOCYTES # BLD AUTO: 0.01 K/UL (ref 0–0.04)
IMM GRANULOCYTES NFR BLD AUTO: 0.2 % (ref 0–0.5)
LYMPHOCYTES # BLD AUTO: 0.8 K/UL (ref 1–4.8)
LYMPHOCYTES NFR BLD: 19.5 % (ref 18–48)
MCH RBC QN AUTO: 30.9 PG (ref 27–31)
MCHC RBC AUTO-ENTMCNC: 34 G/DL (ref 32–36)
MCV RBC AUTO: 91 FL (ref 82–98)
MONOCYTES # BLD AUTO: 0.3 K/UL (ref 0.3–1)
MONOCYTES NFR BLD: 7.6 % (ref 4–15)
NEUTROPHILS # BLD AUTO: 2.6 K/UL (ref 1.8–7.7)
NEUTROPHILS NFR BLD: 62.2 % (ref 38–73)
NRBC BLD-RTO: 0 /100 WBC
PLATELET # BLD AUTO: 194 K/UL (ref 150–450)
PMV BLD AUTO: 9.7 FL (ref 9.2–12.9)
POTASSIUM SERPL-SCNC: 3.8 MMOL/L (ref 3.5–5.1)
PROT SERPL-MCNC: 7 G/DL (ref 6–8.4)
RBC # BLD AUTO: 3.88 M/UL (ref 4.6–6.2)
SODIUM SERPL-SCNC: 138 MMOL/L (ref 136–145)
WBC # BLD AUTO: 4.1 K/UL (ref 3.9–12.7)

## 2024-08-09 PROCEDURE — 63600175 PHARM REV CODE 636 W HCPCS: Performed by: INTERNAL MEDICINE

## 2024-08-09 PROCEDURE — 85025 COMPLETE CBC W/AUTO DIFF WBC: CPT | Performed by: INTERNAL MEDICINE

## 2024-08-09 PROCEDURE — 99999 PR PBB SHADOW E&M-EST. PATIENT-LVL III: CPT | Mod: PBBFAC,,, | Performed by: NURSE PRACTITIONER

## 2024-08-09 PROCEDURE — 82378 CARCINOEMBRYONIC ANTIGEN: CPT | Performed by: INTERNAL MEDICINE

## 2024-08-09 PROCEDURE — 80053 COMPREHEN METABOLIC PANEL: CPT | Performed by: INTERNAL MEDICINE

## 2024-08-09 PROCEDURE — 25000003 PHARM REV CODE 250: Performed by: INTERNAL MEDICINE

## 2024-08-09 PROCEDURE — A4216 STERILE WATER/SALINE, 10 ML: HCPCS | Performed by: INTERNAL MEDICINE

## 2024-08-09 PROCEDURE — 36591 DRAW BLOOD OFF VENOUS DEVICE: CPT

## 2024-08-09 RX ORDER — HEPARIN 100 UNIT/ML
500 SYRINGE INTRAVENOUS
Status: DISCONTINUED | OUTPATIENT
Start: 2024-08-09 | End: 2024-08-09 | Stop reason: HOSPADM

## 2024-08-09 RX ORDER — SODIUM CHLORIDE 0.9 % (FLUSH) 0.9 %
10 SYRINGE (ML) INJECTION
Status: DISCONTINUED | OUTPATIENT
Start: 2024-08-09 | End: 2024-08-09 | Stop reason: HOSPADM

## 2024-08-09 RX ADMIN — HEPARIN 500 UNITS: 100 SYRINGE at 02:08

## 2024-08-09 RX ADMIN — SODIUM CHLORIDE, PRESERVATIVE FREE 10 ML: 5 INJECTION INTRAVENOUS at 02:08

## 2024-08-12 ENCOUNTER — INFUSION (OUTPATIENT)
Dept: INFUSION THERAPY | Facility: HOSPITAL | Age: 44
End: 2024-08-12
Attending: INTERNAL MEDICINE
Payer: COMMERCIAL

## 2024-08-12 VITALS
SYSTOLIC BLOOD PRESSURE: 146 MMHG | DIASTOLIC BLOOD PRESSURE: 95 MMHG | HEART RATE: 72 BPM | BODY MASS INDEX: 27.37 KG/M2 | HEIGHT: 75 IN | RESPIRATION RATE: 16 BRPM | TEMPERATURE: 98 F | OXYGEN SATURATION: 99 % | WEIGHT: 220.13 LBS

## 2024-08-12 DIAGNOSIS — E86.0 DEHYDRATION: ICD-10-CM

## 2024-08-12 DIAGNOSIS — C20 RECTAL ADENOCARCINOMA METASTATIC TO LIVER: ICD-10-CM

## 2024-08-12 DIAGNOSIS — D70.1 CHEMOTHERAPY INDUCED NEUTROPENIA: Primary | ICD-10-CM

## 2024-08-12 DIAGNOSIS — T45.1X5A CHEMOTHERAPY INDUCED NEUTROPENIA: Primary | ICD-10-CM

## 2024-08-12 DIAGNOSIS — C78.7 RECTAL ADENOCARCINOMA METASTATIC TO LIVER: ICD-10-CM

## 2024-08-12 DIAGNOSIS — C78.7 METASTASIS TO LIVER: ICD-10-CM

## 2024-08-12 PROCEDURE — 96416 CHEMO PROLONG INFUSE W/PUMP: CPT

## 2024-08-12 PROCEDURE — 63600175 PHARM REV CODE 636 W HCPCS: Performed by: INTERNAL MEDICINE

## 2024-08-12 PROCEDURE — 25000003 PHARM REV CODE 250: Performed by: INTERNAL MEDICINE

## 2024-08-12 RX ORDER — SODIUM CHLORIDE 0.9 % (FLUSH) 0.9 %
10 SYRINGE (ML) INJECTION
Status: DISCONTINUED | OUTPATIENT
Start: 2024-08-12 | End: 2024-08-12 | Stop reason: HOSPADM

## 2024-08-12 RX ORDER — EPINEPHRINE 0.3 MG/.3ML
0.3 INJECTION SUBCUTANEOUS ONCE AS NEEDED
Status: DISCONTINUED | OUTPATIENT
Start: 2024-08-12 | End: 2024-08-12 | Stop reason: HOSPADM

## 2024-08-12 RX ORDER — DIPHENHYDRAMINE HYDROCHLORIDE 50 MG/ML
50 INJECTION INTRAMUSCULAR; INTRAVENOUS ONCE AS NEEDED
Status: DISCONTINUED | OUTPATIENT
Start: 2024-08-12 | End: 2024-08-12 | Stop reason: HOSPADM

## 2024-08-12 RX ADMIN — FLUOROURACIL 2080 MG: 50 INJECTION, SOLUTION INTRAVENOUS at 11:08

## 2024-08-12 NOTE — PLAN OF CARE
Problem: Fatigue  Goal: Improved Activity Tolerance  Outcome: Progressing  Intervention: Promote Improved Energy  Flowsheets (Taken 8/12/2024 1142)  Fatigue Management: frequent rest breaks encouraged

## 2024-08-13 ENCOUNTER — TREATMENT (OUTPATIENT)
Dept: RADIATION ONCOLOGY | Facility: CLINIC | Age: 44
End: 2024-08-13
Payer: COMMERCIAL

## 2024-08-13 PROCEDURE — G6015 RADIATION TX DELIVERY IMRT: HCPCS | Mod: S$GLB,,, | Performed by: RADIOLOGY

## 2024-08-13 PROCEDURE — 77014 PR  CT GUIDANCE PLACEMENT RAD THERAPY FIELDS: CPT | Mod: S$GLB,,, | Performed by: RADIOLOGY

## 2024-08-16 ENCOUNTER — INFUSION (OUTPATIENT)
Dept: INFUSION THERAPY | Facility: HOSPITAL | Age: 44
End: 2024-08-16
Attending: INTERNAL MEDICINE
Payer: COMMERCIAL

## 2024-08-16 VITALS
RESPIRATION RATE: 17 BRPM | HEART RATE: 75 BPM | DIASTOLIC BLOOD PRESSURE: 94 MMHG | TEMPERATURE: 99 F | SYSTOLIC BLOOD PRESSURE: 143 MMHG | OXYGEN SATURATION: 99 % | BODY MASS INDEX: 27.35 KG/M2 | HEIGHT: 75 IN | WEIGHT: 220 LBS

## 2024-08-16 DIAGNOSIS — C78.7 RECTAL ADENOCARCINOMA METASTATIC TO LIVER: ICD-10-CM

## 2024-08-16 DIAGNOSIS — C78.7 RECTAL CANCER METASTASIZED TO LIVER: ICD-10-CM

## 2024-08-16 DIAGNOSIS — C78.7 METASTASIS TO LIVER: ICD-10-CM

## 2024-08-16 DIAGNOSIS — C20 RECTAL ADENOCARCINOMA METASTATIC TO LIVER: ICD-10-CM

## 2024-08-16 DIAGNOSIS — T45.1X5A CHEMOTHERAPY INDUCED NEUTROPENIA: Primary | ICD-10-CM

## 2024-08-16 DIAGNOSIS — E86.0 DEHYDRATION: ICD-10-CM

## 2024-08-16 DIAGNOSIS — D70.1 CHEMOTHERAPY INDUCED NEUTROPENIA: Primary | ICD-10-CM

## 2024-08-16 DIAGNOSIS — C20 RECTAL CANCER METASTASIZED TO LIVER: ICD-10-CM

## 2024-08-16 DIAGNOSIS — C20 ADENOCARCINOMA OF RECTUM: ICD-10-CM

## 2024-08-16 LAB
ALBUMIN SERPL BCP-MCNC: 4.2 G/DL (ref 3.5–5.2)
ALP SERPL-CCNC: 66 U/L (ref 55–135)
ALT SERPL W/O P-5'-P-CCNC: 25 U/L (ref 10–44)
ANION GAP SERPL CALC-SCNC: 4 MMOL/L (ref 8–16)
AST SERPL-CCNC: 20 U/L (ref 10–40)
BASOPHILS # BLD AUTO: 0.02 K/UL (ref 0–0.2)
BASOPHILS NFR BLD: 0.6 % (ref 0–1.9)
BILIRUB SERPL-MCNC: 0.4 MG/DL (ref 0.1–1)
BUN SERPL-MCNC: 10 MG/DL (ref 6–20)
CALCIUM SERPL-MCNC: 9.1 MG/DL (ref 8.7–10.5)
CHLORIDE SERPL-SCNC: 108 MMOL/L (ref 95–110)
CO2 SERPL-SCNC: 26 MMOL/L (ref 23–29)
CREAT SERPL-MCNC: 0.9 MG/DL (ref 0.5–1.4)
DIFFERENTIAL METHOD BLD: ABNORMAL
EOSINOPHIL # BLD AUTO: 0.3 K/UL (ref 0–0.5)
EOSINOPHIL NFR BLD: 8.7 % (ref 0–8)
ERYTHROCYTE [DISTWIDTH] IN BLOOD BY AUTOMATED COUNT: 16.1 % (ref 11.5–14.5)
EST. GFR  (NO RACE VARIABLE): >60 ML/MIN/1.73 M^2
GLUCOSE SERPL-MCNC: 81 MG/DL (ref 70–110)
HCT VFR BLD AUTO: 35.9 % (ref 40–54)
HGB BLD-MCNC: 12.2 G/DL (ref 14–18)
IMM GRANULOCYTES # BLD AUTO: 0.02 K/UL (ref 0–0.04)
IMM GRANULOCYTES NFR BLD AUTO: 0.6 % (ref 0–0.5)
LYMPHOCYTES # BLD AUTO: 0.6 K/UL (ref 1–4.8)
LYMPHOCYTES NFR BLD: 16 % (ref 18–48)
MCH RBC QN AUTO: 31.5 PG (ref 27–31)
MCHC RBC AUTO-ENTMCNC: 34 G/DL (ref 32–36)
MCV RBC AUTO: 93 FL (ref 82–98)
MONOCYTES # BLD AUTO: 0.3 K/UL (ref 0.3–1)
MONOCYTES NFR BLD: 7 % (ref 4–15)
NEUTROPHILS # BLD AUTO: 2.4 K/UL (ref 1.8–7.7)
NEUTROPHILS NFR BLD: 67.1 % (ref 38–73)
NRBC BLD-RTO: 0 /100 WBC
PLATELET # BLD AUTO: 194 K/UL (ref 150–450)
PMV BLD AUTO: 9.8 FL (ref 9.2–12.9)
POTASSIUM SERPL-SCNC: 3.8 MMOL/L (ref 3.5–5.1)
PROT SERPL-MCNC: 7.1 G/DL (ref 6–8.4)
RBC # BLD AUTO: 3.87 M/UL (ref 4.6–6.2)
SODIUM SERPL-SCNC: 138 MMOL/L (ref 136–145)
WBC # BLD AUTO: 3.56 K/UL (ref 3.9–12.7)

## 2024-08-16 PROCEDURE — 36591 DRAW BLOOD OFF VENOUS DEVICE: CPT

## 2024-08-16 PROCEDURE — 63600175 PHARM REV CODE 636 W HCPCS: Performed by: INTERNAL MEDICINE

## 2024-08-16 PROCEDURE — A4216 STERILE WATER/SALINE, 10 ML: HCPCS | Performed by: INTERNAL MEDICINE

## 2024-08-16 PROCEDURE — 25000003 PHARM REV CODE 250: Performed by: INTERNAL MEDICINE

## 2024-08-16 PROCEDURE — 85025 COMPLETE CBC W/AUTO DIFF WBC: CPT | Performed by: INTERNAL MEDICINE

## 2024-08-16 PROCEDURE — 80053 COMPREHEN METABOLIC PANEL: CPT | Performed by: INTERNAL MEDICINE

## 2024-08-16 RX ORDER — SODIUM CHLORIDE 0.9 % (FLUSH) 0.9 %
10 SYRINGE (ML) INJECTION
Status: DISCONTINUED | OUTPATIENT
Start: 2024-08-16 | End: 2024-08-16 | Stop reason: HOSPADM

## 2024-08-16 RX ORDER — HEPARIN 100 UNIT/ML
500 SYRINGE INTRAVENOUS
Status: DISCONTINUED | OUTPATIENT
Start: 2024-08-16 | End: 2024-08-16 | Stop reason: HOSPADM

## 2024-08-16 RX ADMIN — HEPARIN 500 UNITS: 100 SYRINGE at 10:08

## 2024-08-16 RX ADMIN — SODIUM CHLORIDE, PRESERVATIVE FREE 10 ML: 5 INJECTION INTRAVENOUS at 10:08

## 2024-08-16 NOTE — PLAN OF CARE
Problem: Fatigue  Goal: Improved Activity Tolerance  Intervention: Promote Improved Energy  Flowsheets (Taken 8/16/2024 1032)  Fatigue Management: fatigue-related activity identified  Activity Management: Ambulated -L4

## 2024-08-19 ENCOUNTER — INFUSION (OUTPATIENT)
Dept: INFUSION THERAPY | Facility: HOSPITAL | Age: 44
End: 2024-08-19
Attending: INTERNAL MEDICINE
Payer: COMMERCIAL

## 2024-08-19 VITALS
HEIGHT: 75 IN | RESPIRATION RATE: 18 BRPM | OXYGEN SATURATION: 98 % | HEART RATE: 74 BPM | BODY MASS INDEX: 27.07 KG/M2 | WEIGHT: 217.69 LBS | DIASTOLIC BLOOD PRESSURE: 92 MMHG | TEMPERATURE: 98 F | SYSTOLIC BLOOD PRESSURE: 144 MMHG

## 2024-08-19 DIAGNOSIS — C78.7 RECTAL ADENOCARCINOMA METASTATIC TO LIVER: ICD-10-CM

## 2024-08-19 DIAGNOSIS — C78.7 METASTASIS TO LIVER: ICD-10-CM

## 2024-08-19 DIAGNOSIS — T45.1X5A CHEMOTHERAPY INDUCED NEUTROPENIA: Primary | ICD-10-CM

## 2024-08-19 DIAGNOSIS — E86.0 DEHYDRATION: ICD-10-CM

## 2024-08-19 DIAGNOSIS — C20 RECTAL ADENOCARCINOMA METASTATIC TO LIVER: ICD-10-CM

## 2024-08-19 DIAGNOSIS — D70.1 CHEMOTHERAPY INDUCED NEUTROPENIA: Primary | ICD-10-CM

## 2024-08-19 PROCEDURE — 96416 CHEMO PROLONG INFUSE W/PUMP: CPT

## 2024-08-19 PROCEDURE — 63600175 PHARM REV CODE 636 W HCPCS: Performed by: INTERNAL MEDICINE

## 2024-08-19 PROCEDURE — 25000003 PHARM REV CODE 250: Performed by: INTERNAL MEDICINE

## 2024-08-19 RX ORDER — ONDANSETRON HYDROCHLORIDE 2 MG/ML
16 INJECTION, SOLUTION INTRAVENOUS ONCE
Status: CANCELLED | OUTPATIENT
Start: 2024-08-19

## 2024-08-19 RX ORDER — EPINEPHRINE 0.3 MG/.3ML
0.3 INJECTION SUBCUTANEOUS ONCE AS NEEDED
Status: CANCELLED | OUTPATIENT
Start: 2024-08-19

## 2024-08-19 RX ORDER — HEPARIN 100 UNIT/ML
500 SYRINGE INTRAVENOUS
Status: CANCELLED | OUTPATIENT
Start: 2024-08-19

## 2024-08-19 RX ORDER — SODIUM CHLORIDE 0.9 % (FLUSH) 0.9 %
10 SYRINGE (ML) INJECTION
Status: CANCELLED | OUTPATIENT
Start: 2024-08-19

## 2024-08-19 RX ORDER — DIPHENHYDRAMINE HYDROCHLORIDE 50 MG/ML
50 INJECTION INTRAMUSCULAR; INTRAVENOUS ONCE AS NEEDED
Status: CANCELLED | OUTPATIENT
Start: 2024-08-19

## 2024-08-19 RX ADMIN — FLUOROURACIL 2080 MG: 50 INJECTION, SOLUTION INTRAVENOUS at 01:08

## 2024-08-23 ENCOUNTER — INFUSION (OUTPATIENT)
Dept: INFUSION THERAPY | Facility: HOSPITAL | Age: 44
End: 2024-08-23
Attending: INTERNAL MEDICINE
Payer: COMMERCIAL

## 2024-08-23 ENCOUNTER — TREATMENT (OUTPATIENT)
Dept: RADIATION ONCOLOGY | Facility: CLINIC | Age: 44
End: 2024-08-23
Payer: COMMERCIAL

## 2024-08-23 ENCOUNTER — LAB VISIT (OUTPATIENT)
Dept: LAB | Facility: HOSPITAL | Age: 44
End: 2024-08-23
Attending: INTERNAL MEDICINE
Payer: COMMERCIAL

## 2024-08-23 VITALS
SYSTOLIC BLOOD PRESSURE: 130 MMHG | WEIGHT: 219.5 LBS | RESPIRATION RATE: 16 BRPM | DIASTOLIC BLOOD PRESSURE: 88 MMHG | BODY MASS INDEX: 27.29 KG/M2 | HEIGHT: 75 IN | HEART RATE: 90 BPM | OXYGEN SATURATION: 96 %

## 2024-08-23 DIAGNOSIS — C20 RECTAL CANCER METASTASIZED TO LIVER: ICD-10-CM

## 2024-08-23 DIAGNOSIS — C78.7 RECTAL CANCER METASTASIZED TO LIVER: ICD-10-CM

## 2024-08-23 DIAGNOSIS — E86.0 DEHYDRATION: ICD-10-CM

## 2024-08-23 DIAGNOSIS — C20 ADENOCARCINOMA OF RECTUM: ICD-10-CM

## 2024-08-23 DIAGNOSIS — D70.1 CHEMOTHERAPY INDUCED NEUTROPENIA: Primary | ICD-10-CM

## 2024-08-23 DIAGNOSIS — C20 MALIGNANT NEOPLASM OF RECTUM: Primary | ICD-10-CM

## 2024-08-23 DIAGNOSIS — C78.7 SECONDARY MALIGNANT NEOPLASM OF LIVER: ICD-10-CM

## 2024-08-23 DIAGNOSIS — C78.7 RECTAL ADENOCARCINOMA METASTATIC TO LIVER: ICD-10-CM

## 2024-08-23 DIAGNOSIS — C78.7 METASTASIS TO LIVER: ICD-10-CM

## 2024-08-23 DIAGNOSIS — C20 RECTAL ADENOCARCINOMA METASTATIC TO LIVER: ICD-10-CM

## 2024-08-23 DIAGNOSIS — T45.1X5A CHEMOTHERAPY INDUCED NEUTROPENIA: Primary | ICD-10-CM

## 2024-08-23 LAB
ALBUMIN SERPL BCP-MCNC: 4.3 G/DL (ref 3.5–5.2)
ALP SERPL-CCNC: 70 U/L (ref 55–135)
ALT SERPL W/O P-5'-P-CCNC: 31 U/L (ref 10–44)
ANION GAP SERPL CALC-SCNC: 8 MMOL/L (ref 8–16)
AST SERPL-CCNC: 23 U/L (ref 10–40)
BASOPHILS # BLD AUTO: 0.02 K/UL (ref 0–0.2)
BASOPHILS NFR BLD: 0.4 % (ref 0–1.9)
BILIRUB SERPL-MCNC: 0.4 MG/DL (ref 0.1–1)
BILIRUB UR QL STRIP: NEGATIVE
BUN SERPL-MCNC: 14 MG/DL (ref 6–20)
CALCIUM SERPL-MCNC: 9.3 MG/DL (ref 8.7–10.5)
CHLORIDE SERPL-SCNC: 107 MMOL/L (ref 95–110)
CLARITY UR: CLEAR
CO2 SERPL-SCNC: 24 MMOL/L (ref 23–29)
COLOR UR: COLORLESS
CREAT SERPL-MCNC: 0.8 MG/DL (ref 0.5–1.4)
DIFFERENTIAL METHOD BLD: ABNORMAL
EOSINOPHIL # BLD AUTO: 0.4 K/UL (ref 0–0.5)
EOSINOPHIL NFR BLD: 8.3 % (ref 0–8)
ERYTHROCYTE [DISTWIDTH] IN BLOOD BY AUTOMATED COUNT: 15.6 % (ref 11.5–14.5)
EST. GFR  (NO RACE VARIABLE): >60 ML/MIN/1.73 M^2
GLUCOSE SERPL-MCNC: 95 MG/DL (ref 70–110)
GLUCOSE UR QL STRIP: NEGATIVE
HCT VFR BLD AUTO: 37.7 % (ref 40–54)
HGB BLD-MCNC: 12.7 G/DL (ref 14–18)
HGB UR QL STRIP: NEGATIVE
IMM GRANULOCYTES # BLD AUTO: 0.01 K/UL (ref 0–0.04)
IMM GRANULOCYTES NFR BLD AUTO: 0.2 % (ref 0–0.5)
KETONES UR QL STRIP: NEGATIVE
LEUKOCYTE ESTERASE UR QL STRIP: NEGATIVE
LYMPHOCYTES # BLD AUTO: 0.6 K/UL (ref 1–4.8)
LYMPHOCYTES NFR BLD: 13 % (ref 18–48)
MCH RBC QN AUTO: 31.4 PG (ref 27–31)
MCHC RBC AUTO-ENTMCNC: 33.7 G/DL (ref 32–36)
MCV RBC AUTO: 93 FL (ref 82–98)
MONOCYTES # BLD AUTO: 0.3 K/UL (ref 0.3–1)
MONOCYTES NFR BLD: 7 % (ref 4–15)
NEUTROPHILS # BLD AUTO: 3.2 K/UL (ref 1.8–7.7)
NEUTROPHILS NFR BLD: 71.1 % (ref 38–73)
NITRITE UR QL STRIP: NEGATIVE
NRBC BLD-RTO: 0 /100 WBC
PH UR STRIP: 6 [PH] (ref 5–8)
PLATELET # BLD AUTO: 188 K/UL (ref 150–450)
PMV BLD AUTO: 10 FL (ref 9.2–12.9)
POTASSIUM SERPL-SCNC: 3.7 MMOL/L (ref 3.5–5.1)
PROT SERPL-MCNC: 7.1 G/DL (ref 6–8.4)
PROT UR QL STRIP: NEGATIVE
RBC # BLD AUTO: 4.04 M/UL (ref 4.6–6.2)
SODIUM SERPL-SCNC: 139 MMOL/L (ref 136–145)
SP GR UR STRIP: <1.005 (ref 1–1.03)
URN SPEC COLLECT METH UR: ABNORMAL
UROBILINOGEN UR STRIP-ACNC: NEGATIVE EU/DL
WBC # BLD AUTO: 4.45 K/UL (ref 3.9–12.7)

## 2024-08-23 PROCEDURE — G6015 RADIATION TX DELIVERY IMRT: HCPCS | Mod: S$GLB,,, | Performed by: RADIOLOGY

## 2024-08-23 PROCEDURE — 36415 COLL VENOUS BLD VENIPUNCTURE: CPT | Performed by: INTERNAL MEDICINE

## 2024-08-23 PROCEDURE — 63600175 PHARM REV CODE 636 W HCPCS: Performed by: INTERNAL MEDICINE

## 2024-08-23 PROCEDURE — 81003 URINALYSIS AUTO W/O SCOPE: CPT | Performed by: INTERNAL MEDICINE

## 2024-08-23 PROCEDURE — 85025 COMPLETE CBC W/AUTO DIFF WBC: CPT | Performed by: INTERNAL MEDICINE

## 2024-08-23 PROCEDURE — 80053 COMPREHEN METABOLIC PANEL: CPT | Performed by: INTERNAL MEDICINE

## 2024-08-23 PROCEDURE — 36591 DRAW BLOOD OFF VENOUS DEVICE: CPT

## 2024-08-23 PROCEDURE — 77014 PR  CT GUIDANCE PLACEMENT RAD THERAPY FIELDS: CPT | Mod: S$GLB,,, | Performed by: RADIOLOGY

## 2024-08-23 PROCEDURE — A4216 STERILE WATER/SALINE, 10 ML: HCPCS | Performed by: INTERNAL MEDICINE

## 2024-08-23 PROCEDURE — 25000003 PHARM REV CODE 250: Performed by: INTERNAL MEDICINE

## 2024-08-23 RX ORDER — HEPARIN 100 UNIT/ML
500 SYRINGE INTRAVENOUS
Status: DISCONTINUED | OUTPATIENT
Start: 2024-08-23 | End: 2024-08-26 | Stop reason: HOSPADM

## 2024-08-23 RX ORDER — SODIUM CHLORIDE 0.9 % (FLUSH) 0.9 %
10 SYRINGE (ML) INJECTION
Status: DISCONTINUED | OUTPATIENT
Start: 2024-08-23 | End: 2024-08-26 | Stop reason: HOSPADM

## 2024-08-23 RX ADMIN — HEPARIN 500 UNITS: 100 SYRINGE at 10:08

## 2024-08-23 RX ADMIN — SODIUM CHLORIDE, PRESERVATIVE FREE 10 ML: 5 INJECTION INTRAVENOUS at 10:08

## 2024-08-23 NOTE — PLAN OF CARE
Problem: Fatigue  Goal: Improved Activity Tolerance  Outcome: Progressing  Intervention: Promote Improved Energy  Flowsheets (Taken 8/23/2024 1025)  Fatigue Management: frequent rest breaks encouraged  Sleep/Rest Enhancement: relaxation techniques promoted  Activity Management: Ambulated -L4

## 2024-08-26 ENCOUNTER — TREATMENT (OUTPATIENT)
Dept: RADIATION ONCOLOGY | Facility: CLINIC | Age: 44
End: 2024-08-26
Payer: COMMERCIAL

## 2024-08-26 PROCEDURE — G6015 RADIATION TX DELIVERY IMRT: HCPCS | Mod: S$GLB,,, | Performed by: RADIOLOGY

## 2024-08-26 PROCEDURE — 77014 PR  CT GUIDANCE PLACEMENT RAD THERAPY FIELDS: CPT | Mod: S$GLB,,, | Performed by: RADIOLOGY

## 2024-08-26 PROCEDURE — 77336 RADIATION PHYSICS CONSULT: CPT | Mod: S$GLB,,, | Performed by: RADIOLOGY

## 2024-08-30 ENCOUNTER — OFFICE VISIT (OUTPATIENT)
Facility: CLINIC | Age: 44
End: 2024-08-30
Payer: COMMERCIAL

## 2024-08-30 VITALS
BODY MASS INDEX: 27.94 KG/M2 | DIASTOLIC BLOOD PRESSURE: 87 MMHG | SYSTOLIC BLOOD PRESSURE: 128 MMHG | WEIGHT: 224.69 LBS | HEART RATE: 93 BPM | HEIGHT: 75 IN | RESPIRATION RATE: 16 BRPM | TEMPERATURE: 98 F

## 2024-08-30 DIAGNOSIS — C78.7 RECTAL ADENOCARCINOMA METASTATIC TO LIVER: Primary | ICD-10-CM

## 2024-08-30 DIAGNOSIS — C20 RECTAL ADENOCARCINOMA METASTATIC TO LIVER: Primary | ICD-10-CM

## 2024-08-30 PROCEDURE — 3008F BODY MASS INDEX DOCD: CPT | Mod: CPTII,S$GLB,, | Performed by: INTERNAL MEDICINE

## 2024-08-30 PROCEDURE — 1159F MED LIST DOCD IN RCRD: CPT | Mod: CPTII,S$GLB,, | Performed by: INTERNAL MEDICINE

## 2024-08-30 PROCEDURE — 99215 OFFICE O/P EST HI 40 MIN: CPT | Mod: S$GLB,,, | Performed by: INTERNAL MEDICINE

## 2024-08-30 PROCEDURE — 3074F SYST BP LT 130 MM HG: CPT | Mod: CPTII,S$GLB,, | Performed by: INTERNAL MEDICINE

## 2024-08-30 PROCEDURE — 3079F DIAST BP 80-89 MM HG: CPT | Mod: CPTII,S$GLB,, | Performed by: INTERNAL MEDICINE

## 2024-08-30 PROCEDURE — 4010F ACE/ARB THERAPY RXD/TAKEN: CPT | Mod: CPTII,S$GLB,, | Performed by: INTERNAL MEDICINE

## 2024-08-30 PROCEDURE — 99999 PR PBB SHADOW E&M-EST. PATIENT-LVL III: CPT | Mod: PBBFAC,,, | Performed by: INTERNAL MEDICINE

## 2024-08-30 PROCEDURE — G2211 COMPLEX E/M VISIT ADD ON: HCPCS | Mod: S$GLB,,, | Performed by: INTERNAL MEDICINE

## 2024-08-30 NOTE — PROGRESS NOTES
[facility] [address] Hematology Oncology In Office Subsequent Encounter Note    8/30/24    Subjective:       Patient ID: Yong Arenas is a 44 y.o. male returning today for a regularly scheduled follow-up visit.    Chief Complaint: Rectal Cancer with liver mets.       HPI  Rectal cancer, liver metastases, LN.  Treated with chemo.  Folfox Avastin x's 5.  PET MRI slight decrease in rectal mass, larger liver masses resolved, smaller nodules no change.  CEA 36 to 5.    He has completed the radiation therapy to the pelvis and the concurrent weekly 5 FU infusional chemotherapy.  Dr. Arellano has been his treating radiation physician.    He has completed 5 cycles of chemotherapy with Avastin.  He will resume his chemotherapy and Avastin on September 9, September 23rd, and October 7th to complete 8 cycles of chemotherapy.  Avastin will be held from course number 8 on October 7th in anticipation of his upcoming surgery.    Plan to get MRI of abdomen and pelvis and PET scan around October 14th/15th.  Anticipate that his surgery would be accomplished after October 23rd.  Dr. Rudd had seen him for evaluation of liver metastasectomy and Dr. Sanchez has seen him for rectal surgery.  It is unclear if he will need a low anterior resection or APR.    He is 224 lb and 6 ft 3 in tall.  He is due to have 2 teeth pulled electively and a placement of 2 posts is anticipated with some type of implant or bridge.  This was due to be placed around October 31,  But may be deferred 2 or 3 weeks until after his surgery.    CBC, CMP, CEA and type and screen will be attained at [facility] lab around September 5, 2024      Past Medical History:   Diagnosis Date    Back pain     Cancer     Cervical pain     GERD (gastroesophageal reflux disease)     HLD (hyperlipidemia)     Hypertension     Tinnitus, unspecified ear        Past Surgical History:   Procedure Laterality Date    EGD, WITH BALLOON DILATION OF LESS THAN 30 MILLIMETERS  2020    INSERTION OF  TUNNELED CENTRAL VENOUS CATHETER (CVC) WITH SUBCUTANEOUS PORT Left 2024    Procedure: INSERTION, PORT-A-CATH;  Surgeon: Tash Castellanos MD;  Location: [facility];  Service: General;  Laterality: Left;  port placed to right chest , tunneled over to left chest, subclavian    WISDOM TOOTH EXTRACTION      x2       Social History     Socioeconomic History    Marital status:    Tobacco Use    Smoking status: Former     Types: Cigarettes     Start date:      Quit date:      Years since quittin.6    Smokeless tobacco: Never   Substance and Sexual Activity    Alcohol use: Not Currently     Comment: social    Drug use: Never       Family History   Problem Relation Name Age of Onset    Cancer Mother          liposarcoma on leg    Prostate cancer Father      Colon polyps Father      Leukemia Niece         Review of patient's allergies indicates:  No Known Allergies    Current Outpatient Medications:     amLODIPine (NORVASC) 5 MG tablet, Take 5 mg by mouth once daily., Disp: , Rfl:     ibuprofen (ADVIL,MOTRIN) 200 MG tablet, Take 200 mg by mouth every 6 (six) hours as needed for Pain., Disp: , Rfl:     loratadine-pseudoephedrine 5-120 mg (CLARITIN-D 12 HOUR) 5-120 mg per tablet, Take 1 tablet by mouth daily as needed., Disp: , Rfl:     losartan (COZAAR) 100 MG tablet, Take 1 tablet by mouth once daily., Disp: , Rfl:     omeprazole (PRILOSEC) 40 MG capsule, Take 1 capsule by mouth every morning., Disp: , Rfl:     testosterone cypionate (DEPOTESTOTERONE CYPIONATE) 200 mg/mL injection, Inject 1 mL into the muscle every 14 (fourteen) days., Disp: , Rfl:     hydrALAZINE (APRESOLINE) 25 MG tablet, Take 1 tablet (25 mg total) by mouth 3 (three) times daily as needed (Severely elevated blood pressure above 190/100). (Patient not taking: Reported on 2024), Disp: 30 tablet, Rfl: 0    ondansetron (ZOFRAN) 8 MG tablet, Take 1 tablet (8 mg total) by mouth every 8 (eight) hours as needed for Nausea. (Patient not  "taking: Reported on 8/30/2024), Disp: 30 tablet, Rfl: 2    promethazine (PHENERGAN) 25 MG tablet, Take 1 tablet (25 mg total) by mouth every 4 to 6 hours as needed for Nausea. (Patient not taking: Reported on 8/30/2024), Disp: 30 tablet, Rfl: 2    All medications and past history have been reviewed.    Review of Systems   Constitutional:  Positive for fatigue. Negative for activity change, appetite change and fever.   HENT:  Negative for congestion, mouth sores, postnasal drip, rhinorrhea, sinus pressure, sore throat and trouble swallowing.    Eyes:  Negative for photophobia and visual disturbance.   Respiratory:  Negative for cough, chest tightness, shortness of breath and wheezing.    Cardiovascular:  Negative for chest pain and leg swelling.   Gastrointestinal:  Negative for abdominal distention, abdominal pain, constipation, diarrhea, nausea and vomiting.   Endocrine: Negative for cold intolerance.   Genitourinary:  Negative for decreased urine volume, dysuria and frequency.   Musculoskeletal:  Negative for arthralgias and myalgias.   Skin:  Negative for pallor and rash.   Allergic/Immunologic: Negative for immunocompromised state.   Neurological:  Negative for dizziness, syncope, weakness, light-headedness, numbness and headaches.   Hematological:  Negative for adenopathy. Does not bruise/bleed easily.   Psychiatric/Behavioral:  Negative for sleep disturbance. The patient is not nervous/anxious.        Objective:        /87 (BP Location: Right arm, Patient Position: Sitting, BP Method: Large (Automatic))   Pulse 93   Temp 98.2 °F (36.8 °C) (Temporal)   Resp 16   Ht 6' 3" (1.905 m) Comment: per the pt  Wt 101.9 kg (224 lb 11.2 oz)   BMI 28.09 kg/m²     Physical Exam  Constitutional:       Appearance: Normal appearance.   HENT:      Head: Normocephalic and atraumatic.      Mouth/Throat:      Mouth: Mucous membranes are moist.   Cardiovascular:      Rate and Rhythm: Normal rate and regular rhythm.      " Pulses: Normal pulses.      Heart sounds: Normal heart sounds.   Pulmonary:      Effort: Pulmonary effort is normal. No respiratory distress.      Breath sounds: Normal breath sounds. No wheezing.   Abdominal:      General: There is no distension.      Palpations: Abdomen is soft. There is no mass.      Tenderness: There is no abdominal tenderness.   Musculoskeletal:         General: No swelling. Normal range of motion.      Right lower leg: No edema.      Left lower leg: No edema.   Skin:     General: Skin is warm and dry.      Capillary Refill: Capillary refill takes 2 to 3 seconds.      Findings: No bruising or rash.   Neurological:      Mental Status: He is alert and oriented to person, place, and time. Mental status is at baseline.      Motor: No weakness.   Psychiatric:         Mood and Affect: Mood normal.         Behavior: Behavior normal.             Recent Results (from the past 336 hour(s))   CBC auto differential    Collection Time: 08/23/24 10:20 AM   Result Value Ref Range    WBC 4.45 3.90 - 12.70 K/uL    Hemoglobin 12.7 (L) 14.0 - 18.0 g/dL    Hematocrit 37.7 (L) 40.0 - 54.0 %    Platelets 188 150 - 450 K/uL     CMP  Sodium   Date Value Ref Range Status   08/23/2024 139 136 - 145 mmol/L Final     Potassium   Date Value Ref Range Status   08/23/2024 3.7 3.5 - 5.1 mmol/L Final     Chloride   Date Value Ref Range Status   08/23/2024 107 95 - 110 mmol/L Final     CO2   Date Value Ref Range Status   08/23/2024 24 23 - 29 mmol/L Final     Glucose   Date Value Ref Range Status   08/23/2024 95 70 - 110 mg/dL Final     BUN   Date Value Ref Range Status   08/23/2024 14 6 - 20 mg/dL Final     Creatinine   Date Value Ref Range Status   08/23/2024 0.8 0.5 - 1.4 mg/dL Final     Calcium   Date Value Ref Range Status   08/23/2024 9.3 8.7 - 10.5 mg/dL Final     Total Protein   Date Value Ref Range Status   08/23/2024 7.1 6.0 - 8.4 g/dL Final     Albumin   Date Value Ref Range Status   08/23/2024 4.3 3.5 - 5.2 g/dL  Final     Total Bilirubin   Date Value Ref Range Status   08/23/2024 0.4 0.1 - 1.0 mg/dL Final     Comment:     For infants and newborns, interpretation of results should be based  on gestational age, weight and in agreement with clinical  observations.    Premature Infant recommended reference ranges:  Up to 24 hours.............<8.0 mg/dL  Up to 48 hours............<12.0 mg/dL  3-5 days..................<15.0 mg/dL  6-29 days.................<15.0 mg/dL       Alkaline Phosphatase   Date Value Ref Range Status   08/23/2024 70 55 - 135 U/L Final     AST   Date Value Ref Range Status   08/23/2024 23 10 - 40 U/L Final     ALT   Date Value Ref Range Status   08/23/2024 31 10 - 44 U/L Final     Anion Gap   Date Value Ref Range Status   08/23/2024 8 8 - 16 mmol/L Final         Results for orders placed or performed during the hospital encounter of 06/26/24 (from the past 2160 hour(s))   NM PET CT FDG Skull Base to Mid Thigh    Impression    1. Resolution of 2 previously FDG avid foci in the liver suggestive of positive therapy response.  2. Slight decreased soft tissue thickening in the known rectal lesion compared to the prior study.      Electronically signed by: Sreekanth Park MD  Date:    06/26/2024  Time:    09:41     Results for orders placed or performed during the hospital encounter of 06/10/24 (from the past 2160 hour(s))   CT Head Without Contrast    Narrative    EXAMINATION:  CT HEAD WITHOUT CONTRAST    CLINICAL HISTORY:  Headache, sudden, severe;.    COMPARISON:  None.    FINDINGS:  There is no evidence of intracranial mass, hemorrhage, or midline shift.  The ventricles and sulci are within normal limits.  There are no pathologic extra-axial fluid collections.    There is no evidence of ischemic change or edema.  Cerebellum and brainstem are unremarkable.    The calvarium is intact.  Mucous retention cysts or polyps are noted in both maxillary sinuses, the largest on the right measuring 3.2 cm.      Impression     1. Normal CT appearance of the brain.      Electronically signed by: Warren Jo  Date:    06/10/2024  Time:    15:18                  Plan is to discuss and decide on timing of chemo further, Rad Rx and surgery.    Addendum:  Coordinated care with Dr. Rudd, colorectal surgery and Dr. Arellano and myself.    Completed pelvic irradiation and 5FU infusion.    Thereafter Folfox Avastin for cycle 6 to cycle 8. On 9/9, 9/23, 10/7/24.    Folfox Avastin for Cycle #6 and #7.  Hold Avastin for Cycle 8 in anticipation of upcoming surgery.    Followed by surgical re evaluation.  10/23/24.    Being considered for intrahepatic TACE Rx, liver transplant?    RTC see me 4 weeks.

## 2024-09-13 ENCOUNTER — TELEPHONE (OUTPATIENT)
Facility: CLINIC | Age: 44
End: 2024-09-13
Payer: COMMERCIAL

## 2024-09-13 DIAGNOSIS — C78.7 RECTAL ADENOCARCINOMA METASTATIC TO LIVER: Primary | ICD-10-CM

## 2024-09-13 DIAGNOSIS — C20 RECTAL ADENOCARCINOMA METASTATIC TO LIVER: Primary | ICD-10-CM

## 2024-09-16 ENCOUNTER — INFUSION (OUTPATIENT)
Dept: INFUSION THERAPY | Facility: HOSPITAL | Age: 44
End: 2024-09-16
Attending: INTERNAL MEDICINE
Payer: COMMERCIAL

## 2024-09-16 VITALS
DIASTOLIC BLOOD PRESSURE: 82 MMHG | RESPIRATION RATE: 18 BRPM | TEMPERATURE: 98 F | HEART RATE: 75 BPM | WEIGHT: 227.31 LBS | BODY MASS INDEX: 28.26 KG/M2 | OXYGEN SATURATION: 98 % | SYSTOLIC BLOOD PRESSURE: 134 MMHG | HEIGHT: 75 IN

## 2024-09-16 DIAGNOSIS — C20 RECTAL ADENOCARCINOMA METASTATIC TO LIVER: ICD-10-CM

## 2024-09-16 DIAGNOSIS — C78.7 RECTAL ADENOCARCINOMA METASTATIC TO LIVER: ICD-10-CM

## 2024-09-16 DIAGNOSIS — C78.7 METASTASIS TO LIVER: ICD-10-CM

## 2024-09-16 DIAGNOSIS — D70.1 CHEMOTHERAPY INDUCED NEUTROPENIA: Primary | ICD-10-CM

## 2024-09-16 DIAGNOSIS — T45.1X5A CHEMOTHERAPY INDUCED NEUTROPENIA: Primary | ICD-10-CM

## 2024-09-16 DIAGNOSIS — E86.0 DEHYDRATION: ICD-10-CM

## 2024-09-16 PROCEDURE — 96413 CHEMO IV INFUSION 1 HR: CPT

## 2024-09-16 PROCEDURE — 63600175 PHARM REV CODE 636 W HCPCS: Performed by: INTERNAL MEDICINE

## 2024-09-16 PROCEDURE — 96416 CHEMO PROLONG INFUSE W/PUMP: CPT

## 2024-09-16 PROCEDURE — 96415 CHEMO IV INFUSION ADDL HR: CPT

## 2024-09-16 PROCEDURE — 96411 CHEMO IV PUSH ADDL DRUG: CPT

## 2024-09-16 PROCEDURE — 25000003 PHARM REV CODE 250: Performed by: INTERNAL MEDICINE

## 2024-09-16 PROCEDURE — 96368 THER/DIAG CONCURRENT INF: CPT

## 2024-09-16 PROCEDURE — 96367 TX/PROPH/DG ADDL SEQ IV INF: CPT

## 2024-09-16 RX ORDER — EPINEPHRINE 0.3 MG/.3ML
0.3 INJECTION SUBCUTANEOUS ONCE AS NEEDED
Status: DISCONTINUED | OUTPATIENT
Start: 2024-09-16 | End: 2024-09-16 | Stop reason: HOSPADM

## 2024-09-16 RX ORDER — FLUOROURACIL 50 MG/ML
400 INJECTION, SOLUTION INTRAVENOUS
Status: COMPLETED | OUTPATIENT
Start: 2024-09-16 | End: 2024-09-16

## 2024-09-16 RX ORDER — SODIUM CHLORIDE 0.9 % (FLUSH) 0.9 %
10 SYRINGE (ML) INJECTION
Status: DISCONTINUED | OUTPATIENT
Start: 2024-09-16 | End: 2024-09-16 | Stop reason: HOSPADM

## 2024-09-16 RX ORDER — PROCHLORPERAZINE EDISYLATE 5 MG/ML
10 INJECTION INTRAMUSCULAR; INTRAVENOUS ONCE AS NEEDED
Status: DISCONTINUED | OUTPATIENT
Start: 2024-09-16 | End: 2024-09-16 | Stop reason: HOSPADM

## 2024-09-16 RX ORDER — DIPHENHYDRAMINE HYDROCHLORIDE 50 MG/ML
50 INJECTION INTRAMUSCULAR; INTRAVENOUS ONCE AS NEEDED
Status: DISCONTINUED | OUTPATIENT
Start: 2024-09-16 | End: 2024-09-16 | Stop reason: HOSPADM

## 2024-09-16 RX ADMIN — FLUOROURACIL 5520 MG: 50 INJECTION, SOLUTION INTRAVENOUS at 01:09

## 2024-09-16 RX ADMIN — FLUOROURACIL 920 MG: 50 INJECTION, SOLUTION INTRAVENOUS at 12:09

## 2024-09-16 RX ADMIN — LEUCOVORIN CALCIUM 920 MG: 350 INJECTION, POWDER, LYOPHILIZED, FOR SOLUTION INTRAMUSCULAR; INTRAVENOUS at 11:09

## 2024-09-16 RX ADMIN — PALONOSETRON HYDROCHLORIDE 0.25 MG: 0.25 INJECTION INTRAVENOUS at 10:09

## 2024-09-16 RX ADMIN — OXALIPLATIN 196 MG: 100 INJECTION, SOLUTION, CONCENTRATE INTRAVENOUS at 11:09

## 2024-09-16 NOTE — PLAN OF CARE
Problem: Fatigue  Goal: Improved Activity Tolerance  Outcome: Progressing  Intervention: Promote Improved Energy  Flowsheets (Taken 9/16/2024 0773)  Fatigue Management:   fatigue-related activity identified   paced activity encouraged   frequent rest breaks encouraged  Sleep/Rest Enhancement:   noise level reduced   relaxation techniques promoted   regular sleep/rest pattern promoted  Activity Management:   Ambulated -L4   Up in chair - L3  Environmental Support:   calm environment promoted   distractions minimized   rest periods encouraged

## 2024-09-17 ENCOUNTER — CLINICAL SUPPORT (OUTPATIENT)
Dept: RADIATION ONCOLOGY | Facility: CLINIC | Age: 44
End: 2024-09-17
Payer: COMMERCIAL

## 2024-09-17 DIAGNOSIS — C20 RECTAL ADENOCARCINOMA METASTATIC TO LIVER: Primary | ICD-10-CM

## 2024-09-17 DIAGNOSIS — C78.7 RECTAL ADENOCARCINOMA METASTATIC TO LIVER: Primary | ICD-10-CM

## 2024-09-17 PROCEDURE — 99499 UNLISTED E&M SERVICE: CPT | Mod: 93,,, | Performed by: RADIOLOGY

## 2024-09-17 NOTE — PROGRESS NOTES
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  Patient reported some sensitivity with bowel movements at the end of treatment course.    Contacted patient today for 3 week checkup.  Patient denies fever, chills, abdominal pain.  Denies bright red blood per rectum.  Minimal discomfort with bowel movements.  Using laxatives intermittently.  Endorses numbness/paresthesias of the feet.    A/P  1.  Excellent tolerance and recovery from chemoradiation therapy.  Will notify Dr. Samayoa regarding lower extremity paresthesias.  2.  Follow-up with Dr. Samayoa; on folfox + avastin  3.  Return to clinic 3mos.

## 2024-09-18 ENCOUNTER — INFUSION (OUTPATIENT)
Dept: INFUSION THERAPY | Facility: HOSPITAL | Age: 44
End: 2024-09-18
Attending: INTERNAL MEDICINE
Payer: COMMERCIAL

## 2024-09-18 ENCOUNTER — TELEPHONE (OUTPATIENT)
Dept: SURGERY | Facility: CLINIC | Age: 44
End: 2024-09-18
Payer: COMMERCIAL

## 2024-09-18 ENCOUNTER — TELEPHONE (OUTPATIENT)
Facility: CLINIC | Age: 44
End: 2024-09-18
Payer: COMMERCIAL

## 2024-09-18 VITALS
WEIGHT: 225.31 LBS | SYSTOLIC BLOOD PRESSURE: 140 MMHG | HEART RATE: 58 BPM | RESPIRATION RATE: 17 BRPM | TEMPERATURE: 98 F | BODY MASS INDEX: 28.01 KG/M2 | DIASTOLIC BLOOD PRESSURE: 94 MMHG | HEIGHT: 75 IN

## 2024-09-18 DIAGNOSIS — E86.0 DEHYDRATION: ICD-10-CM

## 2024-09-18 DIAGNOSIS — C78.7 METASTASIS TO LIVER: ICD-10-CM

## 2024-09-18 DIAGNOSIS — T45.1X5A CHEMOTHERAPY INDUCED NEUTROPENIA: Primary | ICD-10-CM

## 2024-09-18 DIAGNOSIS — C20 RECTAL ADENOCARCINOMA METASTATIC TO LIVER: ICD-10-CM

## 2024-09-18 DIAGNOSIS — C78.7 RECTAL ADENOCARCINOMA METASTATIC TO LIVER: Primary | ICD-10-CM

## 2024-09-18 DIAGNOSIS — C20 RECTAL ADENOCARCINOMA METASTATIC TO LIVER: Primary | ICD-10-CM

## 2024-09-18 DIAGNOSIS — D70.1 CHEMOTHERAPY INDUCED NEUTROPENIA: Primary | ICD-10-CM

## 2024-09-18 DIAGNOSIS — C78.7 RECTAL ADENOCARCINOMA METASTATIC TO LIVER: ICD-10-CM

## 2024-09-18 PROCEDURE — 96413 CHEMO IV INFUSION 1 HR: CPT

## 2024-09-18 PROCEDURE — 63600175 PHARM REV CODE 636 W HCPCS: Performed by: INTERNAL MEDICINE

## 2024-09-18 PROCEDURE — A4216 STERILE WATER/SALINE, 10 ML: HCPCS | Performed by: INTERNAL MEDICINE

## 2024-09-18 PROCEDURE — 25000003 PHARM REV CODE 250: Performed by: INTERNAL MEDICINE

## 2024-09-18 RX ORDER — SODIUM CHLORIDE 0.9 % (FLUSH) 0.9 %
10 SYRINGE (ML) INJECTION
Status: DISCONTINUED | OUTPATIENT
Start: 2024-09-18 | End: 2024-09-18 | Stop reason: HOSPADM

## 2024-09-18 RX ORDER — HEPARIN 100 UNIT/ML
500 SYRINGE INTRAVENOUS
Status: DISCONTINUED | OUTPATIENT
Start: 2024-09-18 | End: 2024-09-18 | Stop reason: HOSPADM

## 2024-09-18 RX ADMIN — HEPARIN 500 UNITS: 100 SYRINGE at 12:09

## 2024-09-18 RX ADMIN — SODIUM CHLORIDE, PRESERVATIVE FREE 10 ML: 5 INJECTION INTRAVENOUS at 12:09

## 2024-09-18 RX ADMIN — BEVACIZUMAB-AWWB 500 MG: 400 INJECTION, SOLUTION INTRAVENOUS at 12:09

## 2024-09-18 NOTE — NURSING
Per Suzy Lofton,NP per Dr Samayoa give avastin/mvasi today on day 3 with pump D/C. Pharmacy notified. Pt agreed to treatment today. Avstin/mvasi will be given on Day 1 of next cycle (on 9/30/24) and then held the cycle after that due to anticipated surgery. Pt verbalized understanding.

## 2024-09-18 NOTE — TELEPHONE ENCOUNTER
Spoke with patient's wife. Patient will complete chemo the week of 10/14. Insurance will change on 11/17. If surgery is needed they would like to schedule surgery before 11/17. Office visit scheduled with Dr Calero for 10/28, will try to schedule at [facility] for 10/18- if patient can have MRI/CT done that week before seeing Dr Calero. Dr Calero will need the images done prior to the appt.

## 2024-09-27 ENCOUNTER — OFFICE VISIT (OUTPATIENT)
Facility: CLINIC | Age: 44
End: 2024-09-27
Payer: COMMERCIAL

## 2024-09-27 VITALS
RESPIRATION RATE: 18 BRPM | HEIGHT: 75 IN | WEIGHT: 223.31 LBS | TEMPERATURE: 99 F | SYSTOLIC BLOOD PRESSURE: 145 MMHG | BODY MASS INDEX: 27.77 KG/M2 | DIASTOLIC BLOOD PRESSURE: 94 MMHG | HEART RATE: 83 BPM

## 2024-09-27 DIAGNOSIS — C20 RECTAL ADENOCARCINOMA METASTATIC TO LIVER: Primary | ICD-10-CM

## 2024-09-27 DIAGNOSIS — C78.7 RECTAL ADENOCARCINOMA METASTATIC TO LIVER: Primary | ICD-10-CM

## 2024-09-27 PROCEDURE — 3008F BODY MASS INDEX DOCD: CPT | Mod: CPTII,S$GLB,, | Performed by: INTERNAL MEDICINE

## 2024-09-27 PROCEDURE — 3077F SYST BP >= 140 MM HG: CPT | Mod: CPTII,S$GLB,, | Performed by: INTERNAL MEDICINE

## 2024-09-27 PROCEDURE — 99215 OFFICE O/P EST HI 40 MIN: CPT | Mod: S$GLB,,, | Performed by: INTERNAL MEDICINE

## 2024-09-27 PROCEDURE — 1159F MED LIST DOCD IN RCRD: CPT | Mod: CPTII,S$GLB,, | Performed by: INTERNAL MEDICINE

## 2024-09-27 PROCEDURE — 3080F DIAST BP >= 90 MM HG: CPT | Mod: CPTII,S$GLB,, | Performed by: INTERNAL MEDICINE

## 2024-09-27 PROCEDURE — G2211 COMPLEX E/M VISIT ADD ON: HCPCS | Mod: S$GLB,,, | Performed by: INTERNAL MEDICINE

## 2024-09-27 PROCEDURE — 4010F ACE/ARB THERAPY RXD/TAKEN: CPT | Mod: CPTII,S$GLB,, | Performed by: INTERNAL MEDICINE

## 2024-09-27 PROCEDURE — 99999 PR PBB SHADOW E&M-EST. PATIENT-LVL IV: CPT | Mod: PBBFAC,,, | Performed by: INTERNAL MEDICINE

## 2024-09-27 NOTE — PROGRESS NOTES
[facility] [address] Hematology Oncology In Office Subsequent Encounter Note    9/27/24    Subjective:       Patient ID: Yong Arenas is a 44 y.o. male returning today for a regularly scheduled follow-up visit.    Chief Complaint: Rectal Cancer with liver mets.       HPI  Rectal cancer, liver metastases, LN.  Treated with chemo.  Folfox Avastin x's 5.  PET MRI slight decrease in rectal mass, larger liver masses resolved, smaller nodules no change.  CEA 36 to 5.    He has completed the radiation therapy to the pelvis and the concurrent weekly 5 FU infusional chemotherapy.  Dr. Arellano has been his treating radiation physician.    He has completed 5 cycles of chemotherapy with Avastin.  He will resume his chemotherapy and Avastin on September 9, September 23rd, and October 7th to complete 8 cycles of chemotherapy.  Avastin will be held from course number 8 on October 7th in anticipation of his upcoming surgery.    Plan to get MRI of abdomen and pelvis and PET scan around October 14th/15th.  Anticipate that his surgery would be accomplished after October 23rd.  Dr. Rudd had seen him for evaluation of liver metastasectomy and Dr. Sanchez has seen him for rectal surgery.  It is unclear if he will need a low anterior resection or APR.    He is 224 lb and 6 ft 3 in tall.  He is due to have 2 teeth pulled electively and a placement of 2 posts is anticipated with some type of implant or bridge.  This was due to be placed around October 31,  But may be deferred 2 or 3 weeks until after his surgery.    S/P 6 of 8 cycles,  He has some PN sx in his fingertips and toes, should resolve after chemo completed.    #7  9/30.   #8 10/14, no avastin.  PET and MRI ordered 10/17/24.  See me 10/18.  See Dr. Eddy 10/28.        Past Medical History:   Diagnosis Date    Back pain     Cancer     Cervical pain     GERD (gastroesophageal reflux disease)     HLD (hyperlipidemia)     Hypertension     Tinnitus, unspecified ear        Past  Surgical History:   Procedure Laterality Date    EGD, WITH BALLOON DILATION OF LESS THAN 30 MILLIMETERS      INSERTION OF TUNNELED CENTRAL VENOUS CATHETER (CVC) WITH SUBCUTANEOUS PORT Left 2024    Procedure: INSERTION, PORT-A-CATH;  Surgeon: Tash Castellanos MD;  Location[facility];  Service: General;  Laterality: Left;  port placed to right chest , tunneled over to left chest, subclavian    WISDOM TOOTH EXTRACTION      x2       Social History     Socioeconomic History    Marital status:    Tobacco Use    Smoking status: Former     Types: Cigarettes     Start date:      Quit date:      Years since quittin.7    Smokeless tobacco: Never   Substance and Sexual Activity    Alcohol use: Not Currently     Comment: social    Drug use: Never       Family History   Problem Relation Name Age of Onset    Cancer Mother          liposarcoma on leg    Prostate cancer Father      Colon polyps Father      Leukemia Niece         Review of patient's allergies indicates:  No Known Allergies    Current Outpatient Medications:     amLODIPine (NORVASC) 5 MG tablet, Take 5 mg by mouth once daily., Disp: , Rfl:     ibuprofen (ADVIL,MOTRIN) 200 MG tablet, Take 200 mg by mouth every 6 (six) hours as needed for Pain., Disp: , Rfl:     loratadine-pseudoephedrine 5-120 mg (CLARITIN-D 12 HOUR) 5-120 mg per tablet, Take 1 tablet by mouth daily as needed., Disp: , Rfl:     losartan (COZAAR) 100 MG tablet, Take 1 tablet by mouth once daily., Disp: , Rfl:     omeprazole (PRILOSEC) 40 MG capsule, Take 1 capsule by mouth every morning., Disp: , Rfl:     testosterone cypionate (DEPOTESTOTERONE CYPIONATE) 200 mg/mL injection, Inject 1 mL into the muscle every 14 (fourteen) days., Disp: , Rfl:     hydrALAZINE (APRESOLINE) 25 MG tablet, Take 1 tablet (25 mg total) by mouth 3 (three) times daily as needed (Severely elevated blood pressure above 190/100). (Patient not taking: Reported on 2024), Disp: 30 tablet, Rfl: 0     "ondansetron (ZOFRAN) 8 MG tablet, Take 1 tablet (8 mg total) by mouth every 8 (eight) hours as needed for Nausea. (Patient not taking: Reported on 8/30/2024), Disp: 30 tablet, Rfl: 2    promethazine (PHENERGAN) 25 MG tablet, Take 1 tablet (25 mg total) by mouth every 4 to 6 hours as needed for Nausea. (Patient not taking: Reported on 8/30/2024), Disp: 30 tablet, Rfl: 2    All medications and past history have been reviewed.    Review of Systems   Constitutional:  Positive for fatigue. Negative for activity change, appetite change and fever.   HENT:  Negative for congestion, mouth sores, postnasal drip, rhinorrhea, sinus pressure, sore throat and trouble swallowing.    Eyes:  Negative for photophobia and visual disturbance.   Respiratory:  Negative for cough, chest tightness, shortness of breath and wheezing.    Cardiovascular:  Negative for chest pain and leg swelling.   Gastrointestinal:  Negative for abdominal distention, abdominal pain, constipation, diarrhea, nausea and vomiting.   Endocrine: Negative for cold intolerance.   Genitourinary:  Negative for decreased urine volume, dysuria and frequency.   Musculoskeletal:  Negative for arthralgias and myalgias.   Skin:  Negative for pallor and rash.   Allergic/Immunologic: Negative for immunocompromised state.   Neurological:  Negative for dizziness, syncope, weakness, light-headedness, numbness and headaches.   Hematological:  Negative for adenopathy. Does not bruise/bleed easily.   Psychiatric/Behavioral:  Negative for sleep disturbance. The patient is not nervous/anxious.        Objective:        BP (!) 145/94 (BP Location: Right arm, BP Method: Medium (Automatic))   Pulse 83   Temp 98.9 °F (37.2 °C)   Resp 18   Ht 6' 3" (1.905 m)   Wt 101.3 kg (223 lb 4.8 oz)   BMI 27.91 kg/m²     Physical Exam  Constitutional:       Appearance: Normal appearance.   HENT:      Head: Normocephalic and atraumatic.      Mouth/Throat:      Mouth: Mucous membranes are moist. "   Cardiovascular:      Rate and Rhythm: Normal rate and regular rhythm.      Pulses: Normal pulses.      Heart sounds: Normal heart sounds.   Pulmonary:      Effort: Pulmonary effort is normal. No respiratory distress.      Breath sounds: Normal breath sounds. No wheezing.   Abdominal:      General: There is no distension.      Palpations: Abdomen is soft. There is no mass.      Tenderness: There is no abdominal tenderness.   Musculoskeletal:         General: No swelling. Normal range of motion.      Right lower leg: No edema.      Left lower leg: No edema.   Skin:     General: Skin is warm and dry.      Capillary Refill: Capillary refill takes 2 to 3 seconds.      Findings: No bruising or rash.   Neurological:      Mental Status: He is alert and oriented to person, place, and time. Mental status is at baseline.      Motor: No weakness.   Psychiatric:         Mood and Affect: Mood normal.         Behavior: Behavior normal.             No results found for this or any previous visit (from the past 2 weeks).    CMP  Sodium   Date Value Ref Range Status   08/23/2024 139 136 - 145 mmol/L Final     Potassium   Date Value Ref Range Status   08/23/2024 3.7 3.5 - 5.1 mmol/L Final     Chloride   Date Value Ref Range Status   08/23/2024 107 95 - 110 mmol/L Final     CO2   Date Value Ref Range Status   08/23/2024 24 23 - 29 mmol/L Final     Glucose   Date Value Ref Range Status   08/23/2024 95 70 - 110 mg/dL Final     BUN   Date Value Ref Range Status   08/23/2024 14 6 - 20 mg/dL Final     Creatinine   Date Value Ref Range Status   08/23/2024 0.8 0.5 - 1.4 mg/dL Final     Calcium   Date Value Ref Range Status   08/23/2024 9.3 8.7 - 10.5 mg/dL Final     Total Protein   Date Value Ref Range Status   08/23/2024 7.1 6.0 - 8.4 g/dL Final     Albumin   Date Value Ref Range Status   08/23/2024 4.3 3.5 - 5.2 g/dL Final     Total Bilirubin   Date Value Ref Range Status   08/23/2024 0.4 0.1 - 1.0 mg/dL Final     Comment:     For infants  and newborns, interpretation of results should be based  on gestational age, weight and in agreement with clinical  observations.    Premature Infant recommended reference ranges:  Up to 24 hours.............<8.0 mg/dL  Up to 48 hours............<12.0 mg/dL  3-5 days..................<15.0 mg/dL  6-29 days.................<15.0 mg/dL       Alkaline Phosphatase   Date Value Ref Range Status   08/23/2024 70 55 - 135 U/L Final     AST   Date Value Ref Range Status   08/23/2024 23 10 - 40 U/L Final     ALT   Date Value Ref Range Status   08/23/2024 31 10 - 44 U/L Final     Anion Gap   Date Value Ref Range Status   08/23/2024 8 8 - 16 mmol/L Final         No results found for this or any previous visit (from the past 2160 hours).    No results found for this or any previous visit (from the past 2160 hours).                 Plan is to discuss and decide on timing of chemo further, Rad Rx and surgery.    Addendum:  Coordinated care with Dr. Rudd, colorectal surgery and Dr. Arellano and myself.    Completed pelvic irradiation and 5FU infusion.    Thereafter Folfox Avastin for cycle 6 to cycle 8.     Folfox Avastin for Cycle #6 and #7.  Hold Avastin for Cycle 8 in anticipation of upcoming surgery.    Check MRI and PET 10/17/24    Followed by surgical re evaluation.  10/28/24.    Being considered for intrahepatic TACE Rx, liver transplant?    RTC see me 10/18/24

## 2024-09-27 NOTE — LETTER
September 28, 2024        Oliver Hatch MD  [address] [place] [address]             [facility] - Hematology Oncology  [address]  [facility] [place] [address]  Phone: [phone]  Fax: [phone]   Patient: Yong Arenas   MR Number: [id number]   YOB: 1980   Date of Visit: 9/27/2024       Dear Dr. Hatch:    Thank you for referring Yong Arenas to me for evaluation. Below are the relevant portions of my assessment and plan of care.            If you have questions, please do not hesitate to call me. I look forward to following Yong along with you.    Sincerely,      ELMO Samayoa MD           CC    No Recipients

## 2024-09-29 RX ORDER — PROCHLORPERAZINE EDISYLATE 5 MG/ML
10 INJECTION INTRAMUSCULAR; INTRAVENOUS ONCE AS NEEDED
Status: CANCELLED | OUTPATIENT
Start: 2024-09-30

## 2024-09-29 RX ORDER — HEPARIN 100 UNIT/ML
500 SYRINGE INTRAVENOUS
Status: CANCELLED | OUTPATIENT
Start: 2024-09-30

## 2024-09-29 RX ORDER — PROCHLORPERAZINE EDISYLATE 5 MG/ML
10 INJECTION INTRAMUSCULAR; INTRAVENOUS ONCE AS NEEDED
Status: CANCELLED | OUTPATIENT
Start: 2024-10-02

## 2024-09-29 RX ORDER — SODIUM CHLORIDE 0.9 % (FLUSH) 0.9 %
10 SYRINGE (ML) INJECTION
Status: CANCELLED | OUTPATIENT
Start: 2024-10-02

## 2024-09-29 RX ORDER — FLUOROURACIL 50 MG/ML
400 INJECTION, SOLUTION INTRAVENOUS
Status: CANCELLED | OUTPATIENT
Start: 2024-09-30

## 2024-09-29 RX ORDER — EPINEPHRINE 0.3 MG/.3ML
0.3 INJECTION SUBCUTANEOUS ONCE AS NEEDED
Status: CANCELLED | OUTPATIENT
Start: 2024-09-30

## 2024-09-29 RX ORDER — DIPHENHYDRAMINE HYDROCHLORIDE 50 MG/ML
50 INJECTION INTRAMUSCULAR; INTRAVENOUS ONCE AS NEEDED
Status: CANCELLED | OUTPATIENT
Start: 2024-09-30

## 2024-09-29 RX ORDER — SODIUM CHLORIDE 0.9 % (FLUSH) 0.9 %
10 SYRINGE (ML) INJECTION
Status: CANCELLED | OUTPATIENT
Start: 2024-09-30

## 2024-09-29 RX ORDER — HEPARIN 100 UNIT/ML
500 SYRINGE INTRAVENOUS
Status: CANCELLED | OUTPATIENT
Start: 2024-10-02

## 2024-09-30 ENCOUNTER — INFUSION (OUTPATIENT)
Dept: INFUSION THERAPY | Facility: HOSPITAL | Age: 44
End: 2024-09-30
Attending: INTERNAL MEDICINE
Payer: COMMERCIAL

## 2024-09-30 VITALS
WEIGHT: 224 LBS | HEART RATE: 65 BPM | TEMPERATURE: 98 F | DIASTOLIC BLOOD PRESSURE: 95 MMHG | HEIGHT: 75 IN | SYSTOLIC BLOOD PRESSURE: 144 MMHG | OXYGEN SATURATION: 98 % | BODY MASS INDEX: 27.85 KG/M2 | RESPIRATION RATE: 17 BRPM

## 2024-09-30 DIAGNOSIS — T45.1X5A CHEMOTHERAPY INDUCED NEUTROPENIA: Primary | ICD-10-CM

## 2024-09-30 DIAGNOSIS — C78.7 METASTASIS TO LIVER: ICD-10-CM

## 2024-09-30 DIAGNOSIS — E86.0 DEHYDRATION: ICD-10-CM

## 2024-09-30 DIAGNOSIS — C20 RECTAL ADENOCARCINOMA METASTATIC TO LIVER: ICD-10-CM

## 2024-09-30 DIAGNOSIS — D70.1 CHEMOTHERAPY INDUCED NEUTROPENIA: Primary | ICD-10-CM

## 2024-09-30 DIAGNOSIS — C78.7 RECTAL ADENOCARCINOMA METASTATIC TO LIVER: ICD-10-CM

## 2024-09-30 PROCEDURE — 96417 CHEMO IV INFUS EACH ADDL SEQ: CPT

## 2024-09-30 PROCEDURE — 96415 CHEMO IV INFUSION ADDL HR: CPT

## 2024-09-30 PROCEDURE — 96416 CHEMO PROLONG INFUSE W/PUMP: CPT

## 2024-09-30 PROCEDURE — 96367 TX/PROPH/DG ADDL SEQ IV INF: CPT

## 2024-09-30 PROCEDURE — 96411 CHEMO IV PUSH ADDL DRUG: CPT

## 2024-09-30 PROCEDURE — 96368 THER/DIAG CONCURRENT INF: CPT

## 2024-09-30 PROCEDURE — 25000003 PHARM REV CODE 250: Performed by: INTERNAL MEDICINE

## 2024-09-30 PROCEDURE — 96413 CHEMO IV INFUSION 1 HR: CPT

## 2024-09-30 PROCEDURE — 63600175 PHARM REV CODE 636 W HCPCS: Mod: JZ,JG | Performed by: INTERNAL MEDICINE

## 2024-09-30 RX ORDER — DIPHENHYDRAMINE HYDROCHLORIDE 50 MG/ML
50 INJECTION INTRAMUSCULAR; INTRAVENOUS ONCE AS NEEDED
Status: DISCONTINUED | OUTPATIENT
Start: 2024-09-30 | End: 2024-09-30 | Stop reason: HOSPADM

## 2024-09-30 RX ORDER — PROCHLORPERAZINE EDISYLATE 5 MG/ML
10 INJECTION INTRAMUSCULAR; INTRAVENOUS ONCE AS NEEDED
Status: DISCONTINUED | OUTPATIENT
Start: 2024-09-30 | End: 2024-09-30 | Stop reason: HOSPADM

## 2024-09-30 RX ORDER — SODIUM CHLORIDE 0.9 % (FLUSH) 0.9 %
10 SYRINGE (ML) INJECTION
Status: DISCONTINUED | OUTPATIENT
Start: 2024-09-30 | End: 2024-09-30 | Stop reason: HOSPADM

## 2024-09-30 RX ORDER — FLUOROURACIL 50 MG/ML
400 INJECTION, SOLUTION INTRAVENOUS
Status: COMPLETED | OUTPATIENT
Start: 2024-09-30 | End: 2024-09-30

## 2024-09-30 RX ORDER — EPINEPHRINE 0.3 MG/.3ML
0.3 INJECTION SUBCUTANEOUS ONCE AS NEEDED
Status: DISCONTINUED | OUTPATIENT
Start: 2024-09-30 | End: 2024-09-30 | Stop reason: HOSPADM

## 2024-09-30 RX ADMIN — BEVACIZUMAB-AWWB 500 MG: 400 INJECTION, SOLUTION INTRAVENOUS at 08:09

## 2024-09-30 RX ADMIN — OXALIPLATIN 196 MG: 5 INJECTION, SOLUTION INTRAVENOUS at 09:09

## 2024-09-30 RX ADMIN — FLUOROURACIL 5520 MG: 50 INJECTION, SOLUTION INTRAVENOUS at 11:09

## 2024-09-30 RX ADMIN — LEUCOVORIN CALCIUM 920 MG: 350 INJECTION, POWDER, LYOPHILIZED, FOR SOLUTION INTRAMUSCULAR; INTRAVENOUS at 09:09

## 2024-09-30 RX ADMIN — PALONOSETRON HYDROCHLORIDE 0.25 MG: 0.25 INJECTION INTRAVENOUS at 08:09

## 2024-09-30 RX ADMIN — FLUOROURACIL 920 MG: 50 INJECTION, SOLUTION INTRAVENOUS at 11:09

## 2024-09-30 NOTE — PLAN OF CARE
Problem: Fatigue  Goal: Improved Activity Tolerance  Intervention: Promote Improved Energy  Flowsheets (Taken 9/30/2024 1450)  Fatigue Management: fatigue-related activity identified  Activity Management: Ambulated -L4

## 2024-10-02 ENCOUNTER — PATIENT MESSAGE (OUTPATIENT)
Dept: SURGERY | Facility: CLINIC | Age: 44
End: 2024-10-02
Payer: COMMERCIAL

## 2024-10-02 ENCOUNTER — INFUSION (OUTPATIENT)
Dept: INFUSION THERAPY | Facility: HOSPITAL | Age: 44
End: 2024-10-02
Attending: INTERNAL MEDICINE
Payer: COMMERCIAL

## 2024-10-02 VITALS
TEMPERATURE: 98 F | RESPIRATION RATE: 18 BRPM | DIASTOLIC BLOOD PRESSURE: 93 MMHG | SYSTOLIC BLOOD PRESSURE: 139 MMHG | BODY MASS INDEX: 28.18 KG/M2 | WEIGHT: 226.69 LBS | HEART RATE: 76 BPM | OXYGEN SATURATION: 98 % | HEIGHT: 75 IN

## 2024-10-02 DIAGNOSIS — T45.1X5A CHEMOTHERAPY INDUCED NEUTROPENIA: Primary | ICD-10-CM

## 2024-10-02 DIAGNOSIS — C20 RECTAL ADENOCARCINOMA METASTATIC TO LIVER: ICD-10-CM

## 2024-10-02 DIAGNOSIS — E86.0 DEHYDRATION: ICD-10-CM

## 2024-10-02 DIAGNOSIS — D70.1 CHEMOTHERAPY INDUCED NEUTROPENIA: Primary | ICD-10-CM

## 2024-10-02 DIAGNOSIS — C78.7 RECTAL ADENOCARCINOMA METASTATIC TO LIVER: ICD-10-CM

## 2024-10-02 DIAGNOSIS — C78.7 METASTASIS TO LIVER: ICD-10-CM

## 2024-10-02 PROCEDURE — 96523 IRRIG DRUG DELIVERY DEVICE: CPT

## 2024-10-02 PROCEDURE — A4216 STERILE WATER/SALINE, 10 ML: HCPCS | Performed by: INTERNAL MEDICINE

## 2024-10-02 PROCEDURE — 25000003 PHARM REV CODE 250: Performed by: INTERNAL MEDICINE

## 2024-10-02 PROCEDURE — 63600175 PHARM REV CODE 636 W HCPCS: Performed by: INTERNAL MEDICINE

## 2024-10-02 RX ORDER — HEPARIN 100 UNIT/ML
500 SYRINGE INTRAVENOUS
Status: DISCONTINUED | OUTPATIENT
Start: 2024-10-02 | End: 2024-10-02 | Stop reason: HOSPADM

## 2024-10-02 RX ORDER — SODIUM CHLORIDE 0.9 % (FLUSH) 0.9 %
10 SYRINGE (ML) INJECTION
Status: DISCONTINUED | OUTPATIENT
Start: 2024-10-02 | End: 2024-10-02 | Stop reason: HOSPADM

## 2024-10-02 RX ADMIN — HEPARIN 500 UNITS: 100 SYRINGE at 09:10

## 2024-10-02 RX ADMIN — SODIUM CHLORIDE, PRESERVATIVE FREE 10 ML: 5 INJECTION INTRAVENOUS at 09:10

## 2024-10-03 ENCOUNTER — TELEPHONE (OUTPATIENT)
Dept: SURGERY | Facility: CLINIC | Age: 44
End: 2024-10-03
Payer: COMMERCIAL

## 2024-10-03 NOTE — TELEPHONE ENCOUNTER
Spoke with patient, insurance runs out November 15th. Given financial assistance central pricing number given.

## 2024-10-13 RX ORDER — HEPARIN 100 UNIT/ML
500 SYRINGE INTRAVENOUS
Status: CANCELLED | OUTPATIENT
Start: 2024-10-14

## 2024-10-13 RX ORDER — SODIUM CHLORIDE 0.9 % (FLUSH) 0.9 %
10 SYRINGE (ML) INJECTION
Status: CANCELLED | OUTPATIENT
Start: 2024-10-16

## 2024-10-13 RX ORDER — HEPARIN 100 UNIT/ML
500 SYRINGE INTRAVENOUS
Status: CANCELLED | OUTPATIENT
Start: 2024-10-16

## 2024-10-13 RX ORDER — SODIUM CHLORIDE 0.9 % (FLUSH) 0.9 %
10 SYRINGE (ML) INJECTION
Status: CANCELLED | OUTPATIENT
Start: 2024-10-14

## 2024-10-13 RX ORDER — EPINEPHRINE 0.3 MG/.3ML
0.3 INJECTION SUBCUTANEOUS ONCE AS NEEDED
Status: CANCELLED | OUTPATIENT
Start: 2024-10-14

## 2024-10-13 RX ORDER — PROCHLORPERAZINE EDISYLATE 5 MG/ML
10 INJECTION INTRAMUSCULAR; INTRAVENOUS ONCE AS NEEDED
Status: CANCELLED | OUTPATIENT
Start: 2024-10-14

## 2024-10-13 RX ORDER — DIPHENHYDRAMINE HYDROCHLORIDE 50 MG/ML
50 INJECTION INTRAMUSCULAR; INTRAVENOUS ONCE AS NEEDED
Status: CANCELLED | OUTPATIENT
Start: 2024-10-14

## 2024-10-13 RX ORDER — PROCHLORPERAZINE EDISYLATE 5 MG/ML
10 INJECTION INTRAMUSCULAR; INTRAVENOUS ONCE AS NEEDED
Status: CANCELLED | OUTPATIENT
Start: 2024-10-16

## 2024-10-13 RX ORDER — FLUOROURACIL 50 MG/ML
400 INJECTION, SOLUTION INTRAVENOUS
Status: CANCELLED | OUTPATIENT
Start: 2024-10-14

## 2024-10-14 ENCOUNTER — INFUSION (OUTPATIENT)
Dept: INFUSION THERAPY | Facility: HOSPITAL | Age: 44
End: 2024-10-14
Attending: INTERNAL MEDICINE
Payer: COMMERCIAL

## 2024-10-14 VITALS
DIASTOLIC BLOOD PRESSURE: 98 MMHG | BODY MASS INDEX: 27.66 KG/M2 | RESPIRATION RATE: 15 BRPM | WEIGHT: 222.5 LBS | TEMPERATURE: 98 F | HEIGHT: 75 IN | SYSTOLIC BLOOD PRESSURE: 158 MMHG | OXYGEN SATURATION: 99 % | HEART RATE: 72 BPM

## 2024-10-14 DIAGNOSIS — E86.0 DEHYDRATION: ICD-10-CM

## 2024-10-14 DIAGNOSIS — C20 RECTAL ADENOCARCINOMA METASTATIC TO LIVER: ICD-10-CM

## 2024-10-14 DIAGNOSIS — C78.7 METASTASIS TO LIVER: ICD-10-CM

## 2024-10-14 DIAGNOSIS — D70.1 CHEMOTHERAPY INDUCED NEUTROPENIA: Primary | ICD-10-CM

## 2024-10-14 DIAGNOSIS — C78.7 RECTAL ADENOCARCINOMA METASTATIC TO LIVER: ICD-10-CM

## 2024-10-14 DIAGNOSIS — T45.1X5A CHEMOTHERAPY INDUCED NEUTROPENIA: Primary | ICD-10-CM

## 2024-10-14 PROCEDURE — 96413 CHEMO IV INFUSION 1 HR: CPT

## 2024-10-14 PROCEDURE — 63600175 PHARM REV CODE 636 W HCPCS: Performed by: INTERNAL MEDICINE

## 2024-10-14 PROCEDURE — 96411 CHEMO IV PUSH ADDL DRUG: CPT

## 2024-10-14 PROCEDURE — 25000003 PHARM REV CODE 250: Performed by: INTERNAL MEDICINE

## 2024-10-14 PROCEDURE — 96415 CHEMO IV INFUSION ADDL HR: CPT

## 2024-10-14 PROCEDURE — 96367 TX/PROPH/DG ADDL SEQ IV INF: CPT

## 2024-10-14 PROCEDURE — 96368 THER/DIAG CONCURRENT INF: CPT

## 2024-10-14 PROCEDURE — 96416 CHEMO PROLONG INFUSE W/PUMP: CPT

## 2024-10-14 RX ORDER — EPINEPHRINE 0.3 MG/.3ML
0.3 INJECTION SUBCUTANEOUS ONCE AS NEEDED
Status: DISCONTINUED | OUTPATIENT
Start: 2024-10-14 | End: 2024-10-14 | Stop reason: HOSPADM

## 2024-10-14 RX ORDER — SODIUM CHLORIDE 0.9 % (FLUSH) 0.9 %
10 SYRINGE (ML) INJECTION
Status: DISCONTINUED | OUTPATIENT
Start: 2024-10-14 | End: 2024-10-14 | Stop reason: HOSPADM

## 2024-10-14 RX ORDER — PROCHLORPERAZINE EDISYLATE 5 MG/ML
10 INJECTION INTRAMUSCULAR; INTRAVENOUS ONCE AS NEEDED
Status: DISCONTINUED | OUTPATIENT
Start: 2024-10-14 | End: 2024-10-14 | Stop reason: HOSPADM

## 2024-10-14 RX ORDER — FLUOROURACIL 50 MG/ML
400 INJECTION, SOLUTION INTRAVENOUS
Status: COMPLETED | OUTPATIENT
Start: 2024-10-14 | End: 2024-10-14

## 2024-10-14 RX ORDER — DIPHENHYDRAMINE HYDROCHLORIDE 50 MG/ML
50 INJECTION INTRAMUSCULAR; INTRAVENOUS ONCE AS NEEDED
Status: DISCONTINUED | OUTPATIENT
Start: 2024-10-14 | End: 2024-10-14 | Stop reason: HOSPADM

## 2024-10-14 RX ADMIN — OXALIPLATIN 196 MG: 5 INJECTION, SOLUTION INTRAVENOUS at 10:10

## 2024-10-14 RX ADMIN — FLUOROURACIL 920 MG: 50 INJECTION, SOLUTION INTRAVENOUS at 01:10

## 2024-10-14 RX ADMIN — LEUCOVORIN CALCIUM 920 MG: 350 INJECTION, POWDER, LYOPHILIZED, FOR SOLUTION INTRAMUSCULAR; INTRAVENOUS at 10:10

## 2024-10-14 RX ADMIN — PALONOSETRON HYDROCHLORIDE 0.25 MG: 0.25 INJECTION INTRAVENOUS at 10:10

## 2024-10-14 RX ADMIN — DEXTROSE MONOHYDRATE: 50 INJECTION, SOLUTION INTRAVENOUS at 10:10

## 2024-10-14 RX ADMIN — FLUOROURACIL 5520 MG: 50 INJECTION, SOLUTION INTRAVENOUS at 01:10

## 2024-10-14 NOTE — PLAN OF CARE
Problem: Fatigue  Goal: Improved Activity Tolerance  Intervention: Promote Improved Energy  Flowsheets (Taken 10/14/2024 1016)  Fatigue Management: fatigue-related activity identified  Activity Management: Ambulated -L4

## 2024-10-16 ENCOUNTER — INFUSION (OUTPATIENT)
Dept: INFUSION THERAPY | Facility: HOSPITAL | Age: 44
End: 2024-10-16
Attending: INTERNAL MEDICINE
Payer: COMMERCIAL

## 2024-10-16 VITALS
HEART RATE: 89 BPM | WEIGHT: 229.19 LBS | OXYGEN SATURATION: 97 % | BODY MASS INDEX: 28.65 KG/M2 | TEMPERATURE: 98 F | RESPIRATION RATE: 18 BRPM | SYSTOLIC BLOOD PRESSURE: 157 MMHG | DIASTOLIC BLOOD PRESSURE: 93 MMHG

## 2024-10-16 DIAGNOSIS — D70.1 CHEMOTHERAPY INDUCED NEUTROPENIA: Primary | ICD-10-CM

## 2024-10-16 DIAGNOSIS — C78.7 METASTASIS TO LIVER: ICD-10-CM

## 2024-10-16 DIAGNOSIS — C78.7 RECTAL ADENOCARCINOMA METASTATIC TO LIVER: ICD-10-CM

## 2024-10-16 DIAGNOSIS — C20 RECTAL ADENOCARCINOMA METASTATIC TO LIVER: ICD-10-CM

## 2024-10-16 DIAGNOSIS — T45.1X5A CHEMOTHERAPY INDUCED NEUTROPENIA: Primary | ICD-10-CM

## 2024-10-16 DIAGNOSIS — E86.0 DEHYDRATION: ICD-10-CM

## 2024-10-16 PROCEDURE — A4216 STERILE WATER/SALINE, 10 ML: HCPCS | Performed by: INTERNAL MEDICINE

## 2024-10-16 PROCEDURE — 63600175 PHARM REV CODE 636 W HCPCS: Performed by: INTERNAL MEDICINE

## 2024-10-16 PROCEDURE — 25000003 PHARM REV CODE 250: Performed by: INTERNAL MEDICINE

## 2024-10-16 PROCEDURE — 96523 IRRIG DRUG DELIVERY DEVICE: CPT

## 2024-10-16 RX ORDER — HEPARIN 100 UNIT/ML
500 SYRINGE INTRAVENOUS
Status: DISCONTINUED | OUTPATIENT
Start: 2024-10-16 | End: 2024-10-16 | Stop reason: HOSPADM

## 2024-10-16 RX ORDER — SODIUM CHLORIDE 0.9 % (FLUSH) 0.9 %
10 SYRINGE (ML) INJECTION
Status: DISCONTINUED | OUTPATIENT
Start: 2024-10-16 | End: 2024-10-16 | Stop reason: HOSPADM

## 2024-10-16 RX ADMIN — HEPARIN 500 UNITS: 100 SYRINGE at 10:10

## 2024-10-16 RX ADMIN — SODIUM CHLORIDE, PRESERVATIVE FREE 10 ML: 5 INJECTION INTRAVENOUS at 10:10

## 2024-10-17 ENCOUNTER — OFFICE VISIT (OUTPATIENT)
Dept: SURGERY | Facility: CLINIC | Age: 44
End: 2024-10-17
Payer: COMMERCIAL

## 2024-10-17 ENCOUNTER — HOSPITAL ENCOUNTER (OUTPATIENT)
Dept: RADIOLOGY | Facility: HOSPITAL | Age: 44
Discharge: HOME | End: 2024-10-17
Attending: INTERNAL MEDICINE
Payer: COMMERCIAL

## 2024-10-17 VITALS
HEART RATE: 94 BPM | DIASTOLIC BLOOD PRESSURE: 98 MMHG | WEIGHT: 231.5 LBS | RESPIRATION RATE: 18 BRPM | SYSTOLIC BLOOD PRESSURE: 141 MMHG | HEIGHT: 75 IN | BODY MASS INDEX: 28.78 KG/M2

## 2024-10-17 DIAGNOSIS — C78.7 METASTASES TO THE LIVER: ICD-10-CM

## 2024-10-17 DIAGNOSIS — C78.7 RECTAL ADENOCARCINOMA METASTATIC TO LIVER: ICD-10-CM

## 2024-10-17 DIAGNOSIS — C20 RECTAL ADENOCARCINOMA METASTATIC TO LIVER: ICD-10-CM

## 2024-10-17 DIAGNOSIS — C20 RECTAL CANCER: Primary | ICD-10-CM

## 2024-10-17 LAB — GLUCOSE SERPL-MCNC: 102 MG/DL (ref 70–110)

## 2024-10-17 PROCEDURE — 45330 DIAGNOSTIC SIGMOIDOSCOPY: CPT | Mod: 52,,, | Performed by: COLON & RECTAL SURGERY

## 2024-10-17 PROCEDURE — A9552 F18 FDG: HCPCS | Mod: PN | Performed by: INTERNAL MEDICINE

## 2024-10-17 PROCEDURE — 25500020 PHARM REV CODE 255

## 2024-10-17 PROCEDURE — 99999 PR PBB SHADOW E&M-EST. PATIENT-LVL III: CPT | Mod: PBBFAC,,, | Performed by: COLON & RECTAL SURGERY

## 2024-10-17 PROCEDURE — 74183 MRI ABD W/O CNTR FLWD CNTR: CPT | Mod: 26,,, | Performed by: RADIOLOGY

## 2024-10-17 PROCEDURE — 78815 PET IMAGE W/CT SKULL-THIGH: CPT | Mod: 26,PS,, | Performed by: STUDENT IN AN ORGANIZED HEALTH CARE EDUCATION/TRAINING PROGRAM

## 2024-10-17 PROCEDURE — A9585 GADOBUTROL INJECTION: HCPCS

## 2024-10-17 PROCEDURE — 72197 MRI PELVIS W/O & W/DYE: CPT | Mod: 26,,, | Performed by: RADIOLOGY

## 2024-10-17 PROCEDURE — 74183 MRI ABD W/O CNTR FLWD CNTR: CPT | Mod: TC

## 2024-10-17 PROCEDURE — 78815 PET IMAGE W/CT SKULL-THIGH: CPT | Mod: TC,PS,PN

## 2024-10-17 PROCEDURE — 72197 MRI PELVIS W/O & W/DYE: CPT | Mod: TC

## 2024-10-17 RX ORDER — GADOBUTROL 604.72 MG/ML
INJECTION INTRAVENOUS
Status: COMPLETED
Start: 2024-10-17 | End: 2024-10-17

## 2024-10-17 RX ORDER — FLUDEOXYGLUCOSE F18 500 MCI/ML
12 INJECTION INTRAVENOUS
Status: COMPLETED | OUTPATIENT
Start: 2024-10-17 | End: 2024-10-17

## 2024-10-17 RX ADMIN — GADOBUTROL 10 ML: 604.72 INJECTION INTRAVENOUS at 11:10

## 2024-10-17 RX ADMIN — FLUDEOXYGLUCOSE F-18 12.5 MILLICURIE: 500 INJECTION INTRAVENOUS at 08:10

## 2024-10-17 NOTE — PROGRESS NOTES
PET Imaging Questionnaire    Are you a Diabetic? Recent Blood Sugar level? No    Are you anemic? Bone Marrow Stimulation Meds? No    Have you had a CT Scan, if so when & where was your last one? Yes -     Have you had a PET Scan, if so when & where was your last one? Yes -     Chemotherapy or currently on Chemotherapy? Yes    Radiation therapy? Yes    Surgical History:   Past Surgical History:   Procedure Laterality Date    EGD, WITH BALLOON DILATION OF LESS THAN 30 MILLIMETERS  2020    INSERTION OF TUNNELED CENTRAL VENOUS CATHETER (CVC) WITH SUBCUTANEOUS PORT Left 4/9/2024    Procedure: INSERTION, PORT-A-CATH;  Surgeon: Tash Castellanos MD;  Location: [facility];  Service: General;  Laterality: Left;  port placed to right chest , tunneled over to left chest, subclavian    WISDOM TOOTH EXTRACTION      x2        Have you been fasting for at least 6 hours? Yes    Is there any chance you may be pregnant or breastfeeding? No    Assay: 14.08 MCi@:800   Injection Site:lt hand     Residual: 1.63 mCi@: 802   Technologist: Caty Berry Injected:12.5mCi

## 2024-10-17 NOTE — PROVATION PATIENT INSTRUCTIONS
Discharge Summary/Instructions after an Endoscopic Procedure  Patient Name: Yong Arenas  Patient MRN: [id number]  Patient YOB: 1980 Thursday, October 17, 2024  Enrique Calero MD  Dear patient,  As a result of recent federal legislation (The Federal Cures Act), you may   receive lab or pathology results from your procedure in your [facility]   account before your physician is able to contact you. Your physician or   their representative will relay the results to you with their   recommendations at their soonest availability.  Thank you,  RESTRICTIONS:  During your procedure today, you received medications for sedation.  These   medications may affect your judgment, balance and coordination.  Therefore,   for 24 hours, you have the following restrictions:   - DO NOT drive a car, operate machinery, make legal/financial decisions,   sign important papers or drink alcohol.    ACTIVITY:  Today: no heavy lifting, straining or running due to procedural   sedation/anesthesia.  The following day: return to full activity including work.  DIET:  Eat and drink normally unless instructed otherwise.     TREATMENT FOR COMMON SIDE EFFECTS:  - Mild abdominal pain, nausea, belching, bloating or excessive gas:  rest,   eat lightly and use a heating pad.  - Sore Throat: treat with throat lozenges and/or gargle with warm salt   water.  - Because air was used during the procedure, expelling large amounts of air   from your rectum or belching is normal.  - If a bowel prep was taken, you may not have a bowel movement for 1-3 days.    This is normal.  SYMPTOMS TO WATCH FOR AND REPORT TO YOUR PHYSICIAN:  1. Abdominal pain or bloating, other than gas cramps.  2. Chest pain.  3. Back pain.  4. Signs of infection such as: chills or fever occurring within 24 hours   after the procedure.  5. Rectal bleeding, which would show as bright red, maroon, or black stools.   (A tablespoon of blood from the rectum is not serious, especially  if   hemorrhoids are present.)  6. Vomiting.  7. Weakness or dizziness.  GO DIRECTLY TO THE NEAREST EMERGENCY ROOM IF YOU HAVE ANY OF THE FOLLOWING:      Difficulty breathing              Chills and/or fever over 101 F   Persistent vomiting and/or vomiting blood   Severe abdominal pain   Severe chest pain   Black, tarry stools   Bleeding- more than one tablespoon   Any other symptom or condition that you feel may need urgent attention  Your doctor recommends these additional instructions:  If any biopsies were taken, your doctors clinic will contact you in 1 to 2   weeks with any results.  - Discharge patient to home.   - Patient has a contact number available for emergencies.  The signs and   symptoms of potential delayed complications were discussed with the   patient.  Return to normal activities tomorrow.  Written discharge   instructions were provided to the patient.   - Resume previous diet.   - Continue present medications.  For questions, problems or results please call your physician - Enrique Calero MD at Work:  [phone].  [facility], EMERGENCY ROOM PHONE NUMBER: [phone]  IF A COMPLICATION OR EMERGENCY SITUATION ARISES AND YOU ARE UNABLE TO REACH   YOUR PHYSICIAN - GO DIRECTLY TO THE EMERGENCY ROOM.  Enrique Calero MD  10/17/2024 2:02:09 PM  This report has been verified and signed electronically.  Dear patient,  As a result of recent federal legislation (The Federal Cures Act), you may   receive lab or pathology results from your procedure in your [facility]   account before your physician is able to contact you. Your physician or   their representative will relay the results to you with their   recommendations at their soonest availability.  Thank you,  PROVATION

## 2024-10-17 NOTE — LETTER
November 22, 2024      ELMO Samayoa MD  [address]  [address]  [place] [address]           [facility] [address]  [address]  [place] [address]  Phone: [phone]          Patient: Yong Arenas   MR Number: [id number]   YOB: 1980   Date of Visit: 10/17/2024       Dear Dr Samayoa:    Thank you for referring Yong Arenas to me for evaluation. Attached you will find relevant portions of my assessment and plan of care.    If you have questions, please do not hesitate to call me. I look forward to following Yong Arenas along with you.    Sincerely,    Enrique Calero MD    Enclosure  CC:  MD Oliver King Jr., MD    If you would like to receive this communication electronically, please contact [email or web address] or [phone] to request more information on [facility] access.    For providers and/or their staff who would like to refer a patient to [facility], please contact us through our one-stop-shop provider referral line, [facility], at [phone].    If you feel you have received this communication in error or would no longer like to receive these types of communications, please e-mail [email or web address]

## 2024-10-18 ENCOUNTER — OFFICE VISIT (OUTPATIENT)
Facility: CLINIC | Age: 44
End: 2024-10-18
Payer: COMMERCIAL

## 2024-10-18 VITALS
TEMPERATURE: 99 F | WEIGHT: 225 LBS | DIASTOLIC BLOOD PRESSURE: 93 MMHG | HEART RATE: 82 BPM | SYSTOLIC BLOOD PRESSURE: 149 MMHG | RESPIRATION RATE: 16 BRPM | BODY MASS INDEX: 28.12 KG/M2

## 2024-10-18 DIAGNOSIS — C78.7 METASTASES TO THE LIVER: Primary | ICD-10-CM

## 2024-10-18 PROCEDURE — G2211 COMPLEX E/M VISIT ADD ON: HCPCS | Mod: S$GLB,,, | Performed by: INTERNAL MEDICINE

## 2024-10-18 PROCEDURE — 3008F BODY MASS INDEX DOCD: CPT | Mod: CPTII,S$GLB,, | Performed by: INTERNAL MEDICINE

## 2024-10-18 PROCEDURE — 99215 OFFICE O/P EST HI 40 MIN: CPT | Mod: S$GLB,,, | Performed by: INTERNAL MEDICINE

## 2024-10-18 PROCEDURE — 4010F ACE/ARB THERAPY RXD/TAKEN: CPT | Mod: CPTII,S$GLB,, | Performed by: INTERNAL MEDICINE

## 2024-10-18 PROCEDURE — 3080F DIAST BP >= 90 MM HG: CPT | Mod: CPTII,S$GLB,, | Performed by: INTERNAL MEDICINE

## 2024-10-18 PROCEDURE — 99999 PR PBB SHADOW E&M-EST. PATIENT-LVL III: CPT | Mod: PBBFAC,,, | Performed by: INTERNAL MEDICINE

## 2024-10-18 PROCEDURE — 3077F SYST BP >= 140 MM HG: CPT | Mod: CPTII,S$GLB,, | Performed by: INTERNAL MEDICINE

## 2024-10-18 PROCEDURE — 1159F MED LIST DOCD IN RCRD: CPT | Mod: CPTII,S$GLB,, | Performed by: INTERNAL MEDICINE

## 2024-10-18 NOTE — PROGRESS NOTES
[facility]  Colon, Rectal and Anal Malignancies  Multi-disciplinary Tumor Board     [address] [place]  Tel: [phone]  Fax: [phone]  [email or web address]     Patient name: Yong Arenas   YOB: 1980   MRN: [id number]    Date of discussion: 10/23/24    Thank you for referring Mr. Arenas to our care here at [facility]. Please allow us to summarize our review of records and recommendations.    The following disciplines were present for the discussion:   Colon and Rectal Surgery  Medical Oncology  Radiation Oncology  Pathology  Radiology    Endoscopy reviewed:   Date performed: 10/17/24  Flex sigmoidoscopy    PET-CT  Date performed: 10/17/24   Performed at our facility: [facility] PET  Metastatic disease present: No    MRI Rectal Cancer Protocol  Date performed: 10/17/24   Performed at our facility: [facility] MRI  Tumor location in the rectum: Lower (0-5 cm)  Sphincter involvement: Absent  Pretreatment circumferential resection margin status: Not threatened      Pre-treatment CEA:  Date performed: 4/12/24  CEA Level: 36.9  Current Trend: 5.2 -> 5.5 -> 5.4    Pre-treatment Clinical Stage:  T2 - Tumor invades the muscularis propria  N0 - No regional lymph node metastasis suspected  M1 - Metastasis suspected or confirmed    43 y/o M with metastatic rectal cancer now s/p DIANA (short course RT and FOLFOX), previously discussed at [facility] on 7/15/24, now with persistent rectal disease on re-staging endoscopy and imaging, with near resolution of liver metastases however still present on MRI.    Based on our review of the current information we have made the following recommendations:    This patient has endorsed that he will not accept any stoma - including temporary diverting ileostomy. This would preclude him from the appropriate oncologic resection operation.     Per consensus discussion - will re-discuss the patient's treatment preferences. If he  becomes agreeable to a stoma then surgery becomes possible. If he defers surgery, will likely need re-initiation of systemic chemotherapy.    Referrals: Genetics    Please do not hesitate to contact us for any questions.

## 2024-10-18 NOTE — PROGRESS NOTES
[facility] [address] Hematology Oncology In Office Subsequent Encounter Note    10/18/24    Subjective:       Patient ID: Yong Arenas is a 44 y.o. male returning today for a regularly scheduled follow-up visit.    Chief Complaint: Rectal Cancer with liver mets.       HPI  Rectal cancer, liver metastases, LN.  Treated with chemo.  Folfox Avastin x's 5.  PET MRI slight decrease in rectal mass, larger liver masses resolved, smaller nodules no change.  CEA 36 to 5.    He has completed the radiation therapy to the pelvis and the concurrent weekly 5 FU infusional chemotherapy.  Dr. Arellano has been his treating radiation physician.    He has completed 5 cycles of chemotherapy with Avastin.  He will resume his chemotherapy and Avastin on September 9, September 23rd, and October 7th to complete 8 cycles of chemotherapy.  Avastin will be held from course number 8 on October 7th in anticipation of his upcoming surgery.    Plan to get MRI of abdomen and pelvis and PET scan around October 14th/15th.  Anticipate that his surgery would be accomplished after October 23rd.  Dr. Rudd had seen him for evaluation of liver metastasectomy and Dr. Sanchez has seen him for rectal surgery.  It is unclear if he will need a low anterior resection or APR.    He is 224 lb and 6 ft 3 in tall.  He is due to have 2 teeth pulled electively and a placement of 2 posts is anticipated with some type of implant or bridge.  This was due to be placed around October 31,  But may be deferred 2 or 3 weeks until after his surgery.    S/P 6 of 8 cycles,  He has some PN sx in his fingertips and toes, should resolve after chemo completed.    Chemo given with out avastin.  PET and MRI no hypermetabolism in liver masses, residual nodules seen in liver, smaller, rectal mass 50% reduction.  He met with Dr. Zamora, to be presented to [facility] Tumor board.  MD advised APR, ostomy.  Pt's insurance lapses 11/17/24.  Pt wants to go for 2nd opinion.  Discussed with   Rupal will work on this.  Check U/S of liver.        Past Medical History:   Diagnosis Date    Back pain     Cancer     Cervical pain     GERD (gastroesophageal reflux disease)     HLD (hyperlipidemia)     Hypertension     Tinnitus, unspecified ear        Past Surgical History:   Procedure Laterality Date    EGD, WITH BALLOON DILATION OF LESS THAN 30 MILLIMETERS      INSERTION OF TUNNELED CENTRAL VENOUS CATHETER (CVC) WITH SUBCUTANEOUS PORT Left 2024    Procedure: INSERTION, PORT-A-CATH;  Surgeon: Tash Castellanos MD;  Location: [facility];  Service: General;  Laterality: Left;  port placed to right chest , tunneled over to left chest, subclavian    WISDOM TOOTH EXTRACTION      x2       Social History     Socioeconomic History    Marital status:    Tobacco Use    Smoking status: Former     Types: Cigarettes     Start date:      Quit date:      Years since quittin.8    Smokeless tobacco: Never   Substance and Sexual Activity    Alcohol use: Not Currently     Comment: social    Drug use: Never       Family History   Problem Relation Name Age of Onset    Cancer Mother          liposarcoma on leg    Prostate cancer Father      Colon polyps Father      Leukemia Niece         Review of patient's allergies indicates:  No Known Allergies    Current Outpatient Medications:     ondansetron (ZOFRAN) 8 MG tablet, Take 1 tablet (8 mg total) by mouth every 8 (eight) hours as needed for Nausea., Disp: 30 tablet, Rfl: 2    amLODIPine (NORVASC) 5 MG tablet, Take 5 mg by mouth once daily., Disp: , Rfl:     hydrALAZINE (APRESOLINE) 25 MG tablet, Take 1 tablet (25 mg total) by mouth 3 (three) times daily as needed (Severely elevated blood pressure above 190/100). (Patient not taking: Reported on 2024), Disp: 30 tablet, Rfl: 0    ibuprofen (ADVIL,MOTRIN) 200 MG tablet, Take 200 mg by mouth every 6 (six) hours as needed for Pain., Disp: , Rfl:     loratadine-pseudoephedrine 5-120 mg (CLARITIN-D 12 HOUR)  5-120 mg per tablet, Take 1 tablet by mouth daily as needed., Disp: , Rfl:     losartan (COZAAR) 100 MG tablet, Take 1 tablet by mouth once daily., Disp: , Rfl:     omeprazole (PRILOSEC) 40 MG capsule, Take 1 capsule by mouth every morning., Disp: , Rfl:     promethazine (PHENERGAN) 25 MG tablet, Take 1 tablet (25 mg total) by mouth every 4 to 6 hours as needed for Nausea. (Patient not taking: Reported on 10/18/2024), Disp: 30 tablet, Rfl: 2    testosterone cypionate (DEPOTESTOTERONE CYPIONATE) 200 mg/mL injection, Inject 1 mL into the muscle every 14 (fourteen) days., Disp: , Rfl:     All medications and past history have been reviewed.    Review of Systems   Constitutional:  Positive for fatigue. Negative for activity change, appetite change and fever.   HENT:  Negative for congestion, mouth sores, postnasal drip, rhinorrhea, sinus pressure, sore throat and trouble swallowing.    Eyes:  Negative for photophobia and visual disturbance.   Respiratory:  Negative for cough, chest tightness, shortness of breath and wheezing.    Cardiovascular:  Negative for chest pain and leg swelling.   Gastrointestinal:  Negative for abdominal distention, abdominal pain, constipation, diarrhea, nausea and vomiting.   Endocrine: Negative for cold intolerance.   Genitourinary:  Negative for decreased urine volume, dysuria and frequency.   Musculoskeletal:  Negative for arthralgias and myalgias.   Skin:  Negative for pallor and rash.   Allergic/Immunologic: Negative for immunocompromised state.   Neurological:  Negative for dizziness, syncope, weakness, light-headedness, numbness and headaches.   Hematological:  Negative for adenopathy. Does not bruise/bleed easily.   Psychiatric/Behavioral:  Negative for sleep disturbance. The patient is not nervous/anxious.        Objective:        BP (!) 149/93 (BP Location: Right arm)   Pulse 82   Temp 99.4 °F (37.4 °C)   Resp 16   Wt 102.1 kg (225 lb)   BMI 28.12 kg/m²     Physical  Exam  Constitutional:       Appearance: Normal appearance.   HENT:      Head: Normocephalic and atraumatic.      Mouth/Throat:      Mouth: Mucous membranes are moist.   Cardiovascular:      Rate and Rhythm: Normal rate and regular rhythm.      Pulses: Normal pulses.      Heart sounds: Normal heart sounds.   Pulmonary:      Effort: Pulmonary effort is normal. No respiratory distress.      Breath sounds: Normal breath sounds. No wheezing.   Abdominal:      General: There is no distension.      Palpations: Abdomen is soft. There is no mass.      Tenderness: There is no abdominal tenderness.   Musculoskeletal:         General: No swelling. Normal range of motion.      Right lower leg: No edema.      Left lower leg: No edema.   Skin:     General: Skin is warm and dry.      Capillary Refill: Capillary refill takes 2 to 3 seconds.      Findings: No bruising or rash.   Neurological:      Mental Status: He is alert and oriented to person, place, and time. Mental status is at baseline.      Motor: No weakness.   Psychiatric:         Mood and Affect: Mood normal.         Behavior: Behavior normal.             No results found for this or any previous visit (from the past 2 weeks).    CMP  Sodium   Date Value Ref Range Status   08/23/2024 139 136 - 145 mmol/L Final     Potassium   Date Value Ref Range Status   08/23/2024 3.7 3.5 - 5.1 mmol/L Final     Chloride   Date Value Ref Range Status   08/23/2024 107 95 - 110 mmol/L Final     CO2   Date Value Ref Range Status   08/23/2024 24 23 - 29 mmol/L Final     Glucose   Date Value Ref Range Status   08/23/2024 95 70 - 110 mg/dL Final     BUN   Date Value Ref Range Status   08/23/2024 14 6 - 20 mg/dL Final     Creatinine   Date Value Ref Range Status   08/23/2024 0.8 0.5 - 1.4 mg/dL Final     Calcium   Date Value Ref Range Status   08/23/2024 9.3 8.7 - 10.5 mg/dL Final     Total Protein   Date Value Ref Range Status   08/23/2024 7.1 6.0 - 8.4 g/dL Final     Albumin   Date Value Ref  Range Status   08/23/2024 4.3 3.5 - 5.2 g/dL Final     Total Bilirubin   Date Value Ref Range Status   08/23/2024 0.4 0.1 - 1.0 mg/dL Final     Comment:     For infants and newborns, interpretation of results should be based  on gestational age, weight and in agreement with clinical  observations.    Premature Infant recommended reference ranges:  Up to 24 hours.............<8.0 mg/dL  Up to 48 hours............<12.0 mg/dL  3-5 days..................<15.0 mg/dL  6-29 days.................<15.0 mg/dL       Alkaline Phosphatase   Date Value Ref Range Status   08/23/2024 70 55 - 135 U/L Final     AST   Date Value Ref Range Status   08/23/2024 23 10 - 40 U/L Final     ALT   Date Value Ref Range Status   08/23/2024 31 10 - 44 U/L Final     Anion Gap   Date Value Ref Range Status   08/23/2024 8 8 - 16 mmol/L Final         Results for orders placed or performed during the hospital encounter of 10/17/24 (from the past 2160 hours)   NM PET CT FDG Skull Base to Mid Thigh    Impression    Persistent hypermetabolic low rectal lesion in this patient with rectal cancer.  Lesion is mildly decreased in size and intensity of uptake compared to prior exam.  No definite evidence of hypermetabolic metastasis.    Persistent small mildly hypermetabolic focus of subcutaneous soft tissue thickening in the right gluteal region.  Finding is nonspecific however favor sequela of soft tissue infection/inflammation.  Attention on follow-up.    Stable bilateral subcentimeter pulmonary nodules, below the size threshold for PET.  Attention on follow-up.      Electronically signed by: David Mark  Date:    10/17/2024  Time:    13:51       No results found for this or any previous visit (from the past 2160 hours).                 Plan is to discuss and decide on timing of chemo further, Rad Rx and surgery.    Addendum:  Coordinated care with Dr. Rudd, colorectal surgery and Dr. Arellano and myself.    Completed pelvic irradiation and 5FU  infusion.    Folfox Avastin.    MRI and PET as per reportsand HPI above.    Followed by surgical re evaluation. In progress with Dr. Zamora and [facility] Tumor Board.    Being considered for intrahepatic TACE Rx, liver transplant?    RTC see me 10/29/24

## 2024-10-23 ENCOUNTER — HOSPITAL ENCOUNTER (OUTPATIENT)
Dept: RADIOLOGY | Facility: HOSPITAL | Age: 44
Discharge: HOME | End: 2024-10-23
Attending: INTERNAL MEDICINE
Payer: COMMERCIAL

## 2024-10-23 ENCOUNTER — TUMOR BOARD CONFERENCE (OUTPATIENT)
Dept: SURGERY | Facility: HOSPITAL | Age: 44
End: 2024-10-23
Payer: COMMERCIAL

## 2024-10-23 DIAGNOSIS — C20 RECTAL CANCER: Primary | ICD-10-CM

## 2024-10-23 DIAGNOSIS — C78.7 METASTASES TO THE LIVER: ICD-10-CM

## 2024-10-23 DIAGNOSIS — C78.7 RECTAL ADENOCARCINOMA METASTATIC TO LIVER: Primary | ICD-10-CM

## 2024-10-23 DIAGNOSIS — C20 RECTAL ADENOCARCINOMA METASTATIC TO LIVER: Primary | ICD-10-CM

## 2024-10-23 PROCEDURE — 76700 US EXAM ABDOM COMPLETE: CPT | Mod: 26,,, | Performed by: RADIOLOGY

## 2024-10-23 PROCEDURE — 76700 US EXAM ABDOM COMPLETE: CPT | Mod: TC,PO

## 2024-10-24 ENCOUNTER — TELEPHONE (OUTPATIENT)
Facility: CLINIC | Age: 44
End: 2024-10-24
Payer: COMMERCIAL

## 2024-10-24 NOTE — TELEPHONE ENCOUNTER
Suzy,  Would you or Karen call that Dr. Delaney's office, he wants to cancel the appt for Monday, tell them he is getting a 2nd opinion.    Mr. Arenas is seeing that Dr. Paulino on Wednesday.    Would you see if Dr. Grubbs could see him Wednesday.  Tell MD the situation, we are interested in a combined liver and rectal surgery because of time, insurance coverage.    Dr. Grubbs phone # [phone].    Pt is getting extensive dental work Thursday 10/31/24.    See me Friday, 11/1/24.

## 2024-11-03 ENCOUNTER — TELEPHONE (OUTPATIENT)
Facility: CLINIC | Age: 44
End: 2024-11-03
Payer: COMMERCIAL

## 2024-11-04 ENCOUNTER — TELEPHONE (OUTPATIENT)
Facility: CLINIC | Age: 44
End: 2024-11-04
Payer: COMMERCIAL

## 2024-11-04 NOTE — TELEPHONE ENCOUNTER
----- Message from Haley sent at 11/1/2024  9:13 AM CDT -----  Pt's wife returning call to schedule appt.     #  [phone]

## 2024-11-07 ENCOUNTER — TELEPHONE (OUTPATIENT)
Facility: CLINIC | Age: 44
End: 2024-11-07
Payer: COMMERCIAL

## 2024-11-07 NOTE — TELEPHONE ENCOUNTER
----- Message from Suzy Lofton NP sent at 11/7/2024 11:06 AM CST -----  That is fine  ----- Message -----  From: Karen Aaron RN  Sent: 11/7/2024   9:25 AM CST  To: Suzy Mittal NP    Looks like that referral was sent back in June - Blood pressures have been 140s/90s as of late. I can reach out to Dr Hatch's office to have this resent through them if you'd like.  ----- Message -----  From: Janice Arrington  Sent: 11/6/2024   9:15 AM CST  To: Danita Sotomayor Staff    Bri with [facility] called to let us know that the referral they rcvd from us has to come from PCP.     [phone]

## 2024-11-14 ENCOUNTER — DOCUMENTATION ONLY (OUTPATIENT)
Dept: HEMATOLOGY/ONCOLOGY | Facility: CLINIC | Age: 44
End: 2024-11-14
Payer: COMMERCIAL

## 2024-11-14 NOTE — PROGRESS NOTES
[facility]     Colorectal Liver Metastasis Tumor Board Note      Date: 11/14/2024    Referring Provider: Dr. Calero/Dr. Rudd    Case Overview: Mr. Arenas (44M) was initially diagnosed in 03/2024 with rectal adenocarcinoma with synchronous liver metastasis. He has received 8 cycles of FOLFOX + bevacizumab.     Participants: medical oncology, surgical oncology, colorectal surgery, transplant surgeons, interventional radiology, and oncology navigator     Imaging Reviewed: MRI, CT 10/2024    Radiology Review: Decrease in size of PET positive lesions in the liver. Stable appearance of other lesions within the liver. No new enlarging enhancing lesions.      Orders/Referrals: none.     Board Recommendations: Consider LAR with biopsy of hepatic lesion during surgery. Follow up path results.

## 2024-11-19 ENCOUNTER — OFFICE VISIT (OUTPATIENT)
Facility: CLINIC | Age: 44
End: 2024-11-19
Payer: COMMERCIAL

## 2024-11-19 VITALS
DIASTOLIC BLOOD PRESSURE: 94 MMHG | BODY MASS INDEX: 27.48 KG/M2 | TEMPERATURE: 99 F | RESPIRATION RATE: 16 BRPM | HEART RATE: 86 BPM | WEIGHT: 221 LBS | HEIGHT: 75 IN | SYSTOLIC BLOOD PRESSURE: 131 MMHG

## 2024-11-19 DIAGNOSIS — C78.7 METASTASIS TO LIVER: ICD-10-CM

## 2024-11-19 DIAGNOSIS — C20 RECTAL ADENOCARCINOMA METASTATIC TO LIVER: Primary | ICD-10-CM

## 2024-11-19 DIAGNOSIS — C78.7 RECTAL ADENOCARCINOMA METASTATIC TO LIVER: Primary | ICD-10-CM

## 2024-11-19 PROCEDURE — 3008F BODY MASS INDEX DOCD: CPT | Mod: CPTII,S$GLB,, | Performed by: INTERNAL MEDICINE

## 2024-11-19 PROCEDURE — 99215 OFFICE O/P EST HI 40 MIN: CPT | Mod: S$GLB,,, | Performed by: INTERNAL MEDICINE

## 2024-11-19 PROCEDURE — 4010F ACE/ARB THERAPY RXD/TAKEN: CPT | Mod: CPTII,S$GLB,, | Performed by: INTERNAL MEDICINE

## 2024-11-19 PROCEDURE — 99999 PR PBB SHADOW E&M-EST. PATIENT-LVL III: CPT | Mod: PBBFAC,,, | Performed by: INTERNAL MEDICINE

## 2024-11-19 PROCEDURE — G2211 COMPLEX E/M VISIT ADD ON: HCPCS | Mod: S$GLB,,, | Performed by: INTERNAL MEDICINE

## 2024-11-19 PROCEDURE — 3080F DIAST BP >= 90 MM HG: CPT | Mod: CPTII,S$GLB,, | Performed by: INTERNAL MEDICINE

## 2024-11-19 PROCEDURE — 3075F SYST BP GE 130 - 139MM HG: CPT | Mod: CPTII,S$GLB,, | Performed by: INTERNAL MEDICINE

## 2024-11-19 RX ORDER — OXYCODONE AND ACETAMINOPHEN 5; 325 MG/1; MG/1
1 TABLET ORAL EVERY 6 HOURS PRN
COMMUNITY
Start: 2024-11-15

## 2024-11-19 RX ORDER — CELECOXIB 200 MG/1
CAPSULE ORAL
COMMUNITY
Start: 2024-11-15

## 2024-11-19 NOTE — PROGRESS NOTES
[facility] [address] Hematology Oncology In Office Subsequent Encounter Note    11/19/24    Subjective:       Patient ID: Yong Arenas is a 44 y.o. male returning today for a regularly scheduled follow-up visit.    Chief Complaint: Rectal Cancer with liver mets.       HPI  Rectal cancer, liver metastases, LN.  Treated with chemo.  Folfox Avastin x's 5.  PET MRI slight decrease in rectal mass, larger liver masses resolved, smaller nodules no change.  CEA 36 to 5.    He has completed the radiation therapy to the pelvis and the concurrent weekly 5 FU infusional chemotherapy.  Dr. Arellano has been his treating radiation physician.    He has completed 5 cycles of chemotherapy with Avastin.  He will resume his chemotherapy and Avastin on September 9, September 23rd, and October 7th to complete 8 cycles of chemotherapy.  Avastin will be held from course number 8 on October 7th in anticipation of his upcoming surgery.    Plan to get MRI of abdomen and pelvis and PET scan around October 14th/15th.  Anticipate that his surgery would be accomplished after October 23rd.  Dr. Rudd had seen him for evaluation of liver metastasectomy and Dr. Sanchez has seen him for rectal surgery.  It is unclear if he will need a low anterior resection or APR.    He is 224 lb and 6 ft 3 in tall.  He is due to have 2 teeth pulled electively and a placement of 2 posts is anticipated with some type of implant or bridge.  This was due to be placed around October 31,  But may be deferred 2 or 3 weeks until after his surgery.    S/P 6 of 8 cycles,  He has some PN sx in his fingertips and toes, should resolve after chemo completed.    Chemo given with out avastin.  PET and MRI no hypermetabolism in liver masses, residual nodules seen in liver, smaller, rectal mass 50% reduction.  He met with Dr. Zamora, to be presented to [facility] Tumor board.  MD advised APR, ostomy.  Pt's insurance lapses 11/17/24.  Pt wants to go for 2nd opinion.  Discussed with   Rupal, will work on this.  Check U/S of liver.    [place] to have hemangioma in liver.  Pt refused APR and LAR surgery because he would not agree to ostomy if needed.  He had transanal  resection of the cancer mass on Friday 11/15/24.  Path is pending.  I discussed with Dr. Paola Betts.  [facility].    Pending ctDNA and path report.  S/P 8 cycles of chemo. And chemo/Rad Rx.  RTC 4 weeks.        Past Medical History:   Diagnosis Date    Back pain     Cervical pain     GERD (gastroesophageal reflux disease)     HLD (hyperlipidemia)     Hypertension     Rectal cancer 2024    Tinnitus, unspecified ear        Past Surgical History:   Procedure Laterality Date    EGD, WITH BALLOON DILATION OF LESS THAN 30 MILLIMETERS      INSERTION OF TUNNELED CENTRAL VENOUS CATHETER (CVC) WITH SUBCUTANEOUS PORT Left 2024    Procedure: INSERTION, PORT-A-CATH;  Surgeon: Tash Castellanos MD;  Location: [facility];  Service: General;  Laterality: Left;  port placed to right chest , tunneled over to left chest, subclavian    WISDOM TOOTH EXTRACTION      x2       Social History     Socioeconomic History    Marital status:    Tobacco Use    Smoking status: Former     Types: Cigarettes     Start date:      Quit date:      Years since quittin.9    Smokeless tobacco: Never   Substance and Sexual Activity    Alcohol use: Not Currently     Comment: social    Drug use: Never     Social Drivers of Health     Physical Activity: Unknown (10/30/2024)    Received from [facility]    Exercise Vital Sign     Days of Exercise per Week: 5 days       Family History   Problem Relation Name Age of Onset    Cancer Mother          liposarcoma on leg    Prostate cancer Father      Colon polyps Father      Leukemia Niece         Review of patient's allergies indicates:  No Known Allergies    Current Outpatient Medications:     celecoxib (CELEBREX) 200 MG capsule, SMARTSI Capsule(s) By Mouth Every 12 Hours PRN,  Disp: , Rfl:     ibuprofen (ADVIL,MOTRIN) 200 MG tablet, Take 200 mg by mouth every 6 (six) hours as needed for Pain., Disp: , Rfl:     loratadine-pseudoephedrine 5-120 mg (CLARITIN-D 12 HOUR) 5-120 mg per tablet, Take 1 tablet by mouth daily as needed., Disp: , Rfl:     losartan (COZAAR) 100 MG tablet, Take 1 tablet by mouth once daily., Disp: , Rfl:     omeprazole (PRILOSEC) 40 MG capsule, Take 1 capsule by mouth every morning., Disp: , Rfl:     oxyCODONE-acetaminophen (PERCOCET) 5-325 mg per tablet, Take 1 tablet by mouth every 6 (six) hours as needed., Disp: , Rfl:     testosterone cypionate (DEPOTESTOTERONE CYPIONATE) 200 mg/mL injection, Inject 1 mL into the muscle every 14 (fourteen) days., Disp: , Rfl:     amLODIPine (NORVASC) 5 MG tablet, Take 5 mg by mouth once daily. (Patient not taking: Reported on 11/19/2024), Disp: , Rfl:     hydrALAZINE (APRESOLINE) 25 MG tablet, Take 1 tablet (25 mg total) by mouth 3 (three) times daily as needed (Severely elevated blood pressure above 190/100). (Patient not taking: Reported on 11/19/2024), Disp: 30 tablet, Rfl: 0    ondansetron (ZOFRAN) 8 MG tablet, Take 1 tablet (8 mg total) by mouth every 8 (eight) hours as needed for Nausea. (Patient not taking: Reported on 11/19/2024), Disp: 30 tablet, Rfl: 2    promethazine (PHENERGAN) 25 MG tablet, Take 1 tablet (25 mg total) by mouth every 4 to 6 hours as needed for Nausea. (Patient not taking: Reported on 8/30/2024), Disp: 30 tablet, Rfl: 2    All medications and past history have been reviewed.    Review of Systems   Constitutional:  Positive for fatigue. Negative for activity change, appetite change and fever.   HENT:  Negative for congestion, mouth sores, postnasal drip, rhinorrhea, sinus pressure, sore throat and trouble swallowing.    Eyes:  Negative for photophobia and visual disturbance.   Respiratory:  Negative for cough, chest tightness, shortness of breath and wheezing.    Cardiovascular:  Negative for chest pain  "and leg swelling.   Gastrointestinal:  Negative for abdominal distention, abdominal pain, constipation, diarrhea, nausea and vomiting.   Endocrine: Negative for cold intolerance.   Genitourinary:  Negative for decreased urine volume, dysuria and frequency.   Musculoskeletal:  Negative for arthralgias and myalgias.   Skin:  Negative for pallor and rash.   Allergic/Immunologic: Negative for immunocompromised state.   Neurological:  Negative for dizziness, syncope, weakness, light-headedness, numbness and headaches.   Hematological:  Negative for adenopathy. Does not bruise/bleed easily.   Psychiatric/Behavioral:  Negative for sleep disturbance. The patient is not nervous/anxious.        Objective:        BP (!) 131/94 (BP Location: Left arm, Patient Position: Sitting)   Pulse 86   Temp 98.8 °F (37.1 °C) (Temporal)   Resp 16   Ht 6' 3" (1.905 m)   Wt 100.2 kg (221 lb)   BMI 27.62 kg/m²     Physical Exam  Constitutional:       Appearance: Normal appearance.   HENT:      Head: Normocephalic and atraumatic.      Mouth/Throat:      Mouth: Mucous membranes are moist.   Cardiovascular:      Rate and Rhythm: Normal rate and regular rhythm.      Pulses: Normal pulses.      Heart sounds: Normal heart sounds.   Pulmonary:      Effort: Pulmonary effort is normal. No respiratory distress.      Breath sounds: Normal breath sounds. No wheezing.   Abdominal:      General: There is no distension.      Palpations: Abdomen is soft. There is no mass.      Tenderness: There is no abdominal tenderness.   Musculoskeletal:         General: No swelling. Normal range of motion.      Right lower leg: No edema.      Left lower leg: No edema.   Skin:     General: Skin is warm and dry.      Capillary Refill: Capillary refill takes 2 to 3 seconds.      Findings: No bruising or rash.   Neurological:      Mental Status: He is alert and oriented to person, place, and time. Mental status is at baseline.      Motor: No weakness.   Psychiatric:    "      Mood and Affect: Mood normal.         Behavior: Behavior normal.             No results found for this or any previous visit (from the past 2 weeks).    CMP  Sodium   Date Value Ref Range Status   08/23/2024 139 136 - 145 mmol/L Final     Potassium   Date Value Ref Range Status   08/23/2024 3.7 3.5 - 5.1 mmol/L Final     Chloride   Date Value Ref Range Status   08/23/2024 107 95 - 110 mmol/L Final     CO2   Date Value Ref Range Status   08/23/2024 24 23 - 29 mmol/L Final     Glucose   Date Value Ref Range Status   08/23/2024 95 70 - 110 mg/dL Final     BUN   Date Value Ref Range Status   08/23/2024 14 6 - 20 mg/dL Final     Creatinine   Date Value Ref Range Status   08/23/2024 0.8 0.5 - 1.4 mg/dL Final     Calcium   Date Value Ref Range Status   08/23/2024 9.3 8.7 - 10.5 mg/dL Final     Total Protein   Date Value Ref Range Status   08/23/2024 7.1 6.0 - 8.4 g/dL Final     Albumin   Date Value Ref Range Status   08/23/2024 4.3 3.5 - 5.2 g/dL Final     Total Bilirubin   Date Value Ref Range Status   08/23/2024 0.4 0.1 - 1.0 mg/dL Final     Comment:     For infants and newborns, interpretation of results should be based  on gestational age, weight and in agreement with clinical  observations.    Premature Infant recommended reference ranges:  Up to 24 hours.............<8.0 mg/dL  Up to 48 hours............<12.0 mg/dL  3-5 days..................<15.0 mg/dL  6-29 days.................<15.0 mg/dL       Alkaline Phosphatase   Date Value Ref Range Status   08/23/2024 70 55 - 135 U/L Final     AST   Date Value Ref Range Status   08/23/2024 23 10 - 40 U/L Final     ALT   Date Value Ref Range Status   08/23/2024 31 10 - 44 U/L Final     Anion Gap   Date Value Ref Range Status   08/23/2024 8 8 - 16 mmol/L Final         Results for orders placed or performed during the hospital encounter of 10/17/24 (from the past 2160 hours)   NM PET CT FDG Skull Base to Mid Thigh    Impression    Persistent hypermetabolic low rectal  lesion in this patient with rectal cancer.  Lesion is mildly decreased in size and intensity of uptake compared to prior exam.  No definite evidence of hypermetabolic metastasis.    Persistent small mildly hypermetabolic focus of subcutaneous soft tissue thickening in the right gluteal region.  Finding is nonspecific however favor sequela of soft tissue infection/inflammation.  Attention on follow-up.    Stable bilateral subcentimeter pulmonary nodules, below the size threshold for PET.  Attention on follow-up.      Electronically signed by: David Mark  Date:    10/17/2024  Time:    13:51       No results found for this or any previous visit (from the past 2160 hours).                 Plan is to discuss and decide on timing of chemo further, Rad Rx and surgery.    Addendum:  Coordinated care with Dr. Rudd, colorectal surgery and Dr. Arellano and myself.    Completed pelvic irradiation and 5FU infusion.    Folfox Avastin.    MRI and PET as per reportsand HPI above.    Followed by surgical re evaluation. In progress with Dr. Zamora and [facility] Tumor Board.    Being considered for intrahepatic TACE Rx, liver transplant?  Dr. Grubbs.    S/P transanal resection of tumor mass.  Path report and ctDNA pending.    RTC see me 4 weeks.

## 2024-11-22 ENCOUNTER — PATIENT MESSAGE (OUTPATIENT)
Dept: SURGERY | Facility: CLINIC | Age: 44
End: 2024-11-22
Payer: COMMERCIAL

## 2024-11-22 ENCOUNTER — TELEPHONE (OUTPATIENT)
Facility: CLINIC | Age: 44
End: 2024-11-22
Payer: COMMERCIAL

## 2024-11-22 NOTE — PROGRESS NOTES
"Subjective:       Patient ID: Yong Arenas is a 44 y.o. male.    Chief Complaint: Rectal Cancer    HPI  43 yo M referred for newly diagnosed rectal cancer, initially seen by me 4/22/24.    He reported intermittent rectal bleeding over the past 2-3 years, more recently has noticed a  decrease in stool caliber and prolapsing rectal mass.    Colonoscopy performed 3/18/24 by Dr Goldsmith in [place] showed a "frond-like, villous, fungating, polypoid, sessile, infiltrative non-obstructing large mass in the rectum, non-circumferential, 5 cm in length" -  biopsies showed moderately differentiated adenocarcinoma arising from a TVA.  Also had a small cecal polyp (tubular adenoma) and 3 sessile sigmoid colon polyps (hyperplastic) removed.     CEA 36.9    CT/PET 4/2/24:  -There are 2 foci of abnormal activity in the liver highly suspicious for hepatic metastases.  The 1st is noted on PET axial image 129 with an SUV max of 3.9.  It is subcapsular in location near the dome of the liver at the junction of the right and left lobes.  No definite CT correlate noted.  The 2nd is noted on PET axial image 148, in the medial segment of the left lobe of the liver.  There is a faint CT correlate measuring 1.5 cm.  The SUV max is 4.3.  -There is intensely abnormal activity in the patient's known low rectal mass measuring approximately 4.5 cm in diameter on series 3, image 266 with an SUV max of 17.9.  No abnormal activity is noted in lymph nodes.  Note is made that there is a a 4 mm round left [place] rectal lymph node on series 3, image 252 which is suspicious based on morphology but too small to characterize with PET; this may be characterized further with MRI.  No enlarged lymph nodes are present.  There is no ascites.  -There is a 6 mm nodule in the right mid lung on series 3, image 103 which is most likely an intra fissural lymph node along the plane of the minor fissure.     MRI pelvis 4/2/24 (done at [place]): (Images personally " reviewed.)  There is thickening of the rectal wall beginning in approximately the mid lateral wall bilaterally and extending around the posterior aspect of the rectum. There is also evidence of a polypoid lesion extending into the lumen of the rectum which measures approximately 3.8 x 3.2 cm. This has some enhancement and probably represents neoplastic disease rather than stool. Clinical correlation with physical exam. This would be consistent with a history of neoplastic disease of the rectum.  The anterior wall of the rectum appears spared. There is no evidence of significant infiltration in the adjacent adipose tissue.  No definitive evidence of significant local invasion. Adjacent lymph nodes are within normal limits in size.    MRI abdomen 4/12/24: (Images personally reviewed.)  Several small rim enhancing lesions within the liver with associated diffusion restriction as above. Findings are suspicious for multifocal hepatic metastatic disease.     Attempted CT-guided biopsy of liver lesions seen on MRI 4/15/24:  Ill-defined rounded areas of low-attenuation are seen in the liver. The largest visualized hypoattenuating lesion in the left lobe of the liver measures 9 mm in diameter on this study (this was FDG avid on the previous PET-CT, and better seen on the diagnostic MRI). After several attempts at remeasuring/marking, an appropriate, safe and accessible window for a diagnostic tissue sample was not found, and the procedure was ended.     He reported no significant weight loss or change in appetite, he does c/o fatigue.  Only issue he was having with BM's is that the mass prolapses at times and required manual reduction.  He already had a port placed and plans were in place to start chemotherapy the following day (FOLFOX + Avastin).  He was also scheduled to see Surg Onc (Blaire) that day.    On exam, there was a firm, palpable mass at the level of the anorectal ring. Flex sig showed a 4-5 cm polypoid mass  "left lateral/posterolateral position at top of anorectal ring.          At that point I felt it was best to hold on initiation of chemotherapy until he had been seen by Surg Onc and we discussed his case at [facility].    Dr Lowe felt that the liver MRI demonstrated numerous small lesions with restricted diffusion consistent with colorectal metastasis, these are too small for biopsy unfortunately. His recommendation was to proceed with short course chemoradiation to the rectum, followed by systemic therapy (about 6 cycles), followed by restaging.    He was discussed at [facility] 5/1/24 - recommendations:    "45yo male with distal rectal moderately differentiated adenocarcinoma rising from a TVA, MMR intact. MRI rectal 4/2/24 Polypoid structure within the rectum with a stalk likely extending to the inferior left rectal wall. There is also apparent focal thickening of the lateral posterior wall of the rectum. MRI abdomen 4/12/24 Several small rim enhancing lesions within liver suspicious for multifocal hepatic disease. PET CT 4/2/24 Low rectal carcinoma with two foci within liver concerning for mets, intense activity in known rectal mass, tiny left perirectal lymph node suspicious for mets, right lung nodule favored benign. 4/15/24 attempted liver biopsy unsuccessful. CEA 36.9. Seen by surgical oncology and recommended short course chemo-RT and 6 cycles of systemic therapy followed by restaging. Following multidisciplinary discussion, recommendation is for systemic chemotherapy, Tempus, restaging, and likely long course chemoradiation."     He completed 5 cycles of FOLFOX/Avastin    CT/PET 6/26/24:  1. Resolution of 2 previously FDG avid foci in the liver suggestive of positive therapy response.  2. Slight decreased soft tissue thickening in the known rectal lesion compared to the prior study.    MRI abdomen 6/27/24:  Numerous scattered lesions in the liver suspicious for metastatic disease not significantly changed " compared to images of the prior MRI 04/12/2024.     MRI pelvis 6/27/24:  (Images personally reviewed.)  *DWI - restricted diffusion (with associated low ADC) in tumor or tumor bed: Inadequate diffusion sequence likely related to susceptibility artifact from prominent rectal gas  *T2:The primary tumor and extramural disease: Decreased size of the intermediate T2 signal polypoid lesion arising from the left anterolateral low rectal wall measuring approximately 3.2 x 2.0 cm (series 10, image 18), previously 3.8 x 3.7 cm when remeasured with similar technique.  Lesion can also be seen on postcontrast series 4, image 105.  -Distance of inferior margin of treated tumor/treated area to the anal verge: 3.7 cm  -Distance of inferior margin of treated tumor/treated area to the top of the sphincter complex/anorectal junction:  cm  -Relationship to anterior peritoneal reflection: Below  -Craniocaudal length: 2.2 cm (series 8, image 14).  Previous craniocaudal length: 3.4 cm.   LOW RECTAL TUMORS: Invasion of anal sphincter complex: Absent  *EMVI:   No (none evident pre-treatment)  *MRF (Mesorectal Fascia for T3 only): No evidence of involvement.  *LYMPH NODES  -Mesorectal/superior rectal lymph nodes and/or tumor deposits:N0 (no visible lymph nodes/deposits or only < 5 mm short axis  -Extra mesorectal lymph nodes: No    He then received long-course XRT from 7/15-8/26/24, following which he resumed FOLFOX/Avastin for a total of 8 cycles, last was given on 10/14/24.    He presents today for follow-up.  He had CT/PET and MRI abdomen and pelvis performed earlier today.  He c/o perianal/perirectal discomfort.  He is having BM's with pain but no difficulty.  +Scant rectal bleeding.  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  Eating well, no N/V, minimal weight loss throughout treatment.    Review of patient's allergies indicates:  No Known Allergies    Past Medical History:   Diagnosis Date    Back pain     Cervical pain     GERD  (gastroesophageal reflux disease)     HLD (hyperlipidemia)     Hypertension     Rectal cancer 2024    Tinnitus, unspecified ear        Past Surgical History:   Procedure Laterality Date    EGD, WITH BALLOON DILATION OF LESS THAN 30 MILLIMETERS      INSERTION OF TUNNELED CENTRAL VENOUS CATHETER (CVC) WITH SUBCUTANEOUS PORT Left 2024    Procedure: INSERTION, PORT-A-CATH;  Surgeon: Tash Castellanos MD;  Location: [facility];  Service: General;  Laterality: Left;  port placed to right chest , tunneled over to left chest, subclavian    WISDOM TOOTH EXTRACTION      x2       Current Outpatient Medications   Medication Sig Dispense Refill    amLODIPine (NORVASC) 5 MG tablet Take 5 mg by mouth once daily. (Patient not taking: Reported on 2024)      celecoxib (CELEBREX) 200 MG capsule SMARTSI Capsule(s) By Mouth Every 12 Hours PRN      hydrALAZINE (APRESOLINE) 25 MG tablet Take 1 tablet (25 mg total) by mouth 3 (three) times daily as needed (Severely elevated blood pressure above 190/100). (Patient not taking: Reported on 2024) 30 tablet 0    ibuprofen (ADVIL,MOTRIN) 200 MG tablet Take 200 mg by mouth every 6 (six) hours as needed for Pain.      loratadine-pseudoephedrine 5-120 mg (CLARITIN-D 12 HOUR) 5-120 mg per tablet Take 1 tablet by mouth daily as needed.      losartan (COZAAR) 100 MG tablet Take 1 tablet by mouth once daily.      omeprazole (PRILOSEC) 40 MG capsule Take 1 capsule by mouth every morning.      ondansetron (ZOFRAN) 8 MG tablet Take 1 tablet (8 mg total) by mouth every 8 (eight) hours as needed for Nausea. (Patient not taking: Reported on 2024) 30 tablet 2    oxyCODONE-acetaminophen (PERCOCET) 5-325 mg per tablet Take 1 tablet by mouth every 6 (six) hours as needed.      promethazine (PHENERGAN) 25 MG tablet Take 1 tablet (25 mg total) by mouth every 4 to 6 hours as needed for Nausea. (Patient not taking: Reported on 2024) 30 tablet 2    testosterone cypionate  (DEPOTESTOTERONE CYPIONATE) 200 mg/mL injection Inject 1 mL into the muscle every 14 (fourteen) days.       No current facility-administered medications for this visit.       Family History   Problem Relation Name Age of Onset    Cancer Mother          liposarcoma on leg    Prostate cancer Father      Colon polyps Father      Leukemia Niece         Social History     Socioeconomic History    Marital status:    Tobacco Use    Smoking status: Former     Types: Cigarettes     Start date:      Quit date:      Years since quittin.9    Smokeless tobacco: Never   Substance and Sexual Activity    Alcohol use: Not Currently     Comment: social    Drug use: Never     Social Drivers of Health     Physical Activity: Unknown (10/30/2024)    Received from [facility]    Exercise Vital Sign     Days of Exercise per Week: 5 days       Review of Systems   Constitutional:  Negative for activity change, appetite change, chills, fatigue, fever and unexpected weight change.   HENT:  Negative for congestion and sinus pressure.    Eyes:  Negative for visual disturbance.   Respiratory:  Negative for cough and shortness of breath.    Cardiovascular:  Negative for chest pain and palpitations.   Gastrointestinal:  Positive for anal bleeding. Negative for abdominal distention, abdominal pain, blood in stool, constipation, diarrhea, nausea, rectal pain and vomiting.   Endocrine: Negative for cold intolerance and heat intolerance.   Genitourinary:  Negative for dysuria and frequency.   Musculoskeletal:  Negative for arthralgias and back pain.   Skin:  Negative for rash.   Allergic/Immunologic: Negative for immunocompromised state.   Neurological:  Negative for dizziness, light-headedness and headaches.   Psychiatric/Behavioral:  Negative for confusion. The patient is not nervous/anxious.        Objective:      Physical Exam  Vitals reviewed. Exam conducted with a chaperone present.   Constitutional:       Appearance: He is  well-developed.   HENT:      Head: Normocephalic and atraumatic.   Eyes:      Conjunctiva/sclera: Conjunctivae normal.   Pulmonary:      Effort: Pulmonary effort is normal. No respiratory distress.   Abdominal:      General: There is no distension.      Palpations: Abdomen is soft. There is no mass.      Tenderness: There is no abdominal tenderness. There is no guarding or rebound.   Genitourinary:     Comments: HILDA - good tone, +palpable fixed mass at tip of finger  Skin:     General: Skin is warm and dry.      Findings: No erythema.   Neurological:      Mental Status: He is alert and oriented to person, place, and time.         Flexible sigmoidoscopy - persistent polypoid mass left lateral/posterolateral position at top of anorectal ring - see Provation report for details.         Lab Results   Component Value Date    WBC 4.45 08/23/2024    HGB 12.7 (L) 08/23/2024    HCT 37.7 (L) 08/23/2024    MCV 93 08/23/2024     08/23/2024     BMP  Lab Results   Component Value Date     08/23/2024    K 3.7 08/23/2024     08/23/2024    CO2 24 08/23/2024    BUN 14 08/23/2024    CREATININE 0.8 08/23/2024    CALCIUM 9.3 08/23/2024    ANIONGAP 8 08/23/2024     CMP  Sodium   Date Value Ref Range Status   08/23/2024 139 136 - 145 mmol/L Final     Potassium   Date Value Ref Range Status   08/23/2024 3.7 3.5 - 5.1 mmol/L Final     Chloride   Date Value Ref Range Status   08/23/2024 107 95 - 110 mmol/L Final     CO2   Date Value Ref Range Status   08/23/2024 24 23 - 29 mmol/L Final     Glucose   Date Value Ref Range Status   08/23/2024 95 70 - 110 mg/dL Final     BUN   Date Value Ref Range Status   08/23/2024 14 6 - 20 mg/dL Final     Creatinine   Date Value Ref Range Status   08/23/2024 0.8 0.5 - 1.4 mg/dL Final     Calcium   Date Value Ref Range Status   08/23/2024 9.3 8.7 - 10.5 mg/dL Final     Total Protein   Date Value Ref Range Status   08/23/2024 7.1 6.0 - 8.4 g/dL Final     Albumin   Date Value Ref Range Status    08/23/2024 4.3 3.5 - 5.2 g/dL Final     Total Bilirubin   Date Value Ref Range Status   08/23/2024 0.4 0.1 - 1.0 mg/dL Final     Comment:     For infants and newborns, interpretation of results should be based  on gestational age, weight and in agreement with clinical  observations.    Premature Infant recommended reference ranges:  Up to 24 hours.............<8.0 mg/dL  Up to 48 hours............<12.0 mg/dL  3-5 days..................<15.0 mg/dL  6-29 days.................<15.0 mg/dL       Alkaline Phosphatase   Date Value Ref Range Status   08/23/2024 70 55 - 135 U/L Final     AST   Date Value Ref Range Status   08/23/2024 23 10 - 40 U/L Final     ALT   Date Value Ref Range Status   08/23/2024 31 10 - 44 U/L Final     Anion Gap   Date Value Ref Range Status   08/23/2024 8 8 - 16 mmol/L Final     Lab Results   Component Value Date    CEA 5.2 (H) 08/09/2024           Assessment:       1. Rectal cancer    2. Metastases to the liver        Plan:   -Follow-up results of imaging studies done earlier today.  -Will re-present at [facility].  -I told patient if metastatic disease in liver has resolved or is minimal, we could consider oncologic resection of the rectal cancer - given its very distal location and fixation to the anorectal ring, this will require at the very least a hand-sewn colo-anal anastomosis with intersphincteric dissection and a temporary DLI, or more likely an APR with permanent colostomy. My suspicion is that his function would be quite poor with an inter-sphincteric dissection and CAA.   -He told me that under no circumstances would he undergo an operation that necessitated an ostomy, even a temporary one.  I reiterated the rationale for needing a stoma, and he reiterated that under no circumstances would accept a stoma.  -Will follow-up with him again after [facility].        Enrique Calero MD, FACS, FASCRS  , Department of Colon & Rectal Surgery     This note was created using voice recognition  software, and may contain some unrecognized transcriptional errors.     Addendum:    10/17/24 CT/PET:  -Persistent hypermetabolic low rectal lesion in this patient with rectal cancer.  Lesion is mildly decreased in size and intensity of uptake compared to prior exam.  No definite evidence of hypermetabolic metastasis.  -Persistent small mildly hypermetabolic focus of subcutaneous soft tissue thickening in the right gluteal region.  Finding is nonspecific however favor sequela of soft tissue infection/inflammation.  Attention on follow-up.  -Stable bilateral subcentimeter pulmonary nodules, below the size threshold for PET.  Attention on follow-up.    10/17/24 MRI abdomen:  -The 2 original PET CT positive lesions of the liver parenchyma are significantly shrunk in size. There is a stable 9 mm lesion on the inferior surface of the left lobe, unchanged but not PET CT positive or contrast enhancing. A 1.2 cm cavernous hemangioma seen adjacent to the gallbladder. There are multiple stable 2 and 3 mm lesions identified on the most sensitive scans which are unchanged and may represent small cysts. An enlarging contrast enhancing mass is not seen.     10/17/24 MRI pelvis:  (Images personally reviewed.)  TREATED TUMOR/TUMOR BED CHARACTERISTICS:  *DWI - restricted diffusion (with associated low ADC) in tumor or tumor bed: Absent  *T2: The primary tumor and extramural disease shows a lobulated sessile lesion along the mucosa to the left of midline position between the 1 and 03:00 o'clock clock face positions when viewed in the axial plane.  Reference axial series 6 images 18-21.  -Distance of inferior margin of treated tumor/treated area to the anal verge: 3.9 cm  -Distance of inferior margin of treated tumor/treated area to the top of the sphincter complex/anorectal junction: Abuts the cranial margin  -Relationship to anterior peritoneal reflection: Below  -Craniocaudal length: 1.7 cm (see sagittal series 4, image  15)  -Previous craniocaudal length: 2.2 cm  -Maximal wall thickness: 1.5 cm  LOW RECTAL TUMORS: Invasion of anal sphincter complex: Absent  *EMVI:  No (none evident pre-treatment)  *MRF (Mesorectal Fascia for T3 only)  -Shortest distance of tumor to MRF: Not applicable  *LYMPH NODES  -Mesorectal/superior rectal lymph nodes and/or tumor deposits: None  -Extra mesorectal lymph nodes: No    Case was discussed at [facility] 10/23 and consensus was to proceed with oncologic resection, most likely APR given proximity to anorectal ring, if patient changes his mind and is willing to undergo surgery.    Enrique Calero MD, FACS, FASCRS  , Department of Colon & Rectal Surgery'

## 2024-11-23 NOTE — TELEPHONE ENCOUNTER
He had transanal resection of tumor mass, 11/15/24  Per Dr. Paola Betts at [facility].  Please call to see if path is ready?

## 2024-12-02 ENCOUNTER — INFUSION (OUTPATIENT)
Dept: INFUSION THERAPY | Facility: HOSPITAL | Age: 44
End: 2024-12-02
Attending: INTERNAL MEDICINE
Payer: COMMERCIAL

## 2024-12-02 VITALS
RESPIRATION RATE: 16 BRPM | OXYGEN SATURATION: 99 % | BODY MASS INDEX: 27.99 KG/M2 | HEART RATE: 70 BPM | SYSTOLIC BLOOD PRESSURE: 160 MMHG | HEIGHT: 75 IN | TEMPERATURE: 98 F | DIASTOLIC BLOOD PRESSURE: 90 MMHG | WEIGHT: 225.13 LBS

## 2024-12-02 DIAGNOSIS — C78.7 METASTASIS TO LIVER: Primary | ICD-10-CM

## 2024-12-02 PROCEDURE — 63600175 PHARM REV CODE 636 W HCPCS: Performed by: INTERNAL MEDICINE

## 2024-12-02 PROCEDURE — 25000003 PHARM REV CODE 250: Performed by: INTERNAL MEDICINE

## 2024-12-02 PROCEDURE — 96523 IRRIG DRUG DELIVERY DEVICE: CPT

## 2024-12-02 PROCEDURE — A4216 STERILE WATER/SALINE, 10 ML: HCPCS | Performed by: INTERNAL MEDICINE

## 2024-12-02 RX ORDER — HEPARIN 100 UNIT/ML
500 SYRINGE INTRAVENOUS
OUTPATIENT
Start: 2024-12-02

## 2024-12-02 RX ORDER — HEPARIN 100 UNIT/ML
500 SYRINGE INTRAVENOUS
Status: DISCONTINUED | OUTPATIENT
Start: 2024-12-02 | End: 2024-12-02 | Stop reason: HOSPADM

## 2024-12-02 RX ORDER — SODIUM CHLORIDE 0.9 % (FLUSH) 0.9 %
10 SYRINGE (ML) INJECTION
OUTPATIENT
Start: 2024-12-02

## 2024-12-02 RX ORDER — SODIUM CHLORIDE 0.9 % (FLUSH) 0.9 %
10 SYRINGE (ML) INJECTION
Status: DISCONTINUED | OUTPATIENT
Start: 2024-12-02 | End: 2024-12-02 | Stop reason: HOSPADM

## 2024-12-02 RX ADMIN — HEPARIN 500 UNITS: 100 SYRINGE at 03:12

## 2024-12-02 RX ADMIN — SODIUM CHLORIDE, PRESERVATIVE FREE 10 ML: 5 INJECTION INTRAVENOUS at 03:12

## 2024-12-02 NOTE — PLAN OF CARE
Problem: Infection  Goal: Absence of Infection Signs and Symptoms  Outcome: Progressing  Intervention: Prevent or Manage Infection  Flowsheets (Taken 12/2/2024 3476)  Infection Management: aseptic technique maintained  Isolation Precautions: precautions initiated

## 2024-12-11 ENCOUNTER — OFFICE VISIT (OUTPATIENT)
Dept: RADIATION ONCOLOGY | Facility: CLINIC | Age: 44
End: 2024-12-11
Payer: COMMERCIAL

## 2024-12-11 VITALS
DIASTOLIC BLOOD PRESSURE: 89 MMHG | OXYGEN SATURATION: 98 % | BODY MASS INDEX: 27.64 KG/M2 | HEART RATE: 107 BPM | SYSTOLIC BLOOD PRESSURE: 150 MMHG | RESPIRATION RATE: 16 BRPM | WEIGHT: 221.13 LBS

## 2024-12-11 DIAGNOSIS — C20 RECTAL ADENOCARCINOMA METASTATIC TO LIVER: Primary | ICD-10-CM

## 2024-12-11 DIAGNOSIS — C78.7 RECTAL ADENOCARCINOMA METASTATIC TO LIVER: Primary | ICD-10-CM

## 2024-12-11 DIAGNOSIS — C78.7 METASTASIS TO LIVER: ICD-10-CM

## 2024-12-11 PROCEDURE — 1159F MED LIST DOCD IN RCRD: CPT | Mod: CPTII,S$GLB,, | Performed by: RADIOLOGY

## 2024-12-11 PROCEDURE — G2211 COMPLEX E/M VISIT ADD ON: HCPCS | Mod: S$GLB,,, | Performed by: RADIOLOGY

## 2024-12-11 PROCEDURE — 1160F RVW MEDS BY RX/DR IN RCRD: CPT | Mod: CPTII,S$GLB,, | Performed by: RADIOLOGY

## 2024-12-11 PROCEDURE — 3079F DIAST BP 80-89 MM HG: CPT | Mod: CPTII,S$GLB,, | Performed by: RADIOLOGY

## 2024-12-11 PROCEDURE — 4010F ACE/ARB THERAPY RXD/TAKEN: CPT | Mod: CPTII,S$GLB,, | Performed by: RADIOLOGY

## 2024-12-11 PROCEDURE — 3077F SYST BP >= 140 MM HG: CPT | Mod: CPTII,S$GLB,, | Performed by: RADIOLOGY

## 2024-12-11 PROCEDURE — 99215 OFFICE O/P EST HI 40 MIN: CPT | Mod: S$GLB,,, | Performed by: RADIOLOGY

## 2024-12-11 PROCEDURE — 3008F BODY MASS INDEX DOCD: CPT | Mod: CPTII,S$GLB,, | Performed by: RADIOLOGY

## 2024-12-11 NOTE — PROGRESS NOTES
Yong Arenas  [id number]  1980 12/11/2024  No referring provider defined for this encounter.    DIAGNOSIS:  Cancer Staging   Rectal adenocarcinoma metastatic to liver  Staging form: Colon and Rectum, AJCC 8th Edition  - Clinical stage from 4/22/2024: Stage IRINEO (cN1a, cM1a) - Signed by Nii Rudd Jr., MD on 4/26/2024    REASON FOR VISIT: Routine scheduled follow-up.    HISTORY OF PRESENT ILLNESS:   Yong Arenas is a 44 y.o. male  former smoker, social ETOH drinker who presented with longstanding bright red blood per rectum.    Colonoscopy revealed a villous, fungating, sessile, infiltrative nonobstructing mass in the low rectum measuring 5 cm in length, located just above the anus, partially removed.  Pathology returned moderately differentiated adenocarcinoma arising in tubulovillous adenoma with extensive high-grade dysplasia; MMR status pending.    MRI R  IMPRESSION:  1: Polypoid structure within the rectum with a stalk likely extending to the inferior left rectal wall. There is also apparent focal thickening of the lateral posterior wall of the rectum.  2: No definitive evidence of significant local invasion. Adjacent lymph nodes are within normal limits in size.  3/22 Dr. Samayoa (per pt): MRI and PET planned. PORT: Dr. Castellanos. Start date 4/8    MRI A  Impression:  1. Several small rim enhancing lesions within the liver with associated diffusion restriction as above.  Findings are suspicious for multifocal hepatic metastatic disease.  Details as above.     PET  Impression:  Low rectal carcinoma with findings highly suspicious for metastatic disease to the liver.  Tiny left perirectal lymph node is suspicious based on morphology but too small to characterize with PET.  This may be assessed further with MRI.  Right lung nodule favored to correspond to benign intra fissural lymph node, but difficult to confirm intra fissural location on current CT images.  Follow-up with conventional chest CT  would be helpful.    CEA: 36.9    Completed 5 cycles of FOLFOX plus Avastin.    Completed 5 FU sensitized radiotherapy to 54 Gy ending August 26, 2024.  Patient reported some sensitivity with bowel movements at the end of treatment course.    Completed 3 additional cycles of FOLFOX (Avastin held).    INTERVAL HISTORY:   Following imaging below showing resolution of liver metastases with suspected residual in the rectum, patient was seen by Dr. Calero with TB recommendation to proceed to APR which he was refusing.  He was ultimately seen by Dr. Betts and underwent transanal excision: path unavailable at today's visit.  Patient reports he consulted with Dr. Grubbs who is considering him for exploration/liver resection.    11/18/24 CEA 2.9    Today patient reports continued discomfort in the rectum without bright red blood.  He endorses constipation and pain with bowel movements.  He continues to have bilateral lower extremity neuropathy and metallic taste, both slowly improving.  Denies fever, chills, chest pain, shortness of breath, cough or hemoptysis.  Denies right upper quadrant pain.    Review of systems otherwise negative unless indicated in HPI/interval history.    Past Medical History:   Diagnosis Date    Back pain     Cervical pain     GERD (gastroesophageal reflux disease)     HLD (hyperlipidemia)     Hypertension     Rectal cancer 03/2024    Tinnitus, unspecified ear      Past Surgical History:   Procedure Laterality Date    EGD, WITH BALLOON DILATION OF LESS THAN 30 MILLIMETERS  2020    INSERTION OF TUNNELED CENTRAL VENOUS CATHETER (CVC) WITH SUBCUTANEOUS PORT Left 4/9/2024    Procedure: INSERTION, PORT-A-CATH;  Surgeon: Tash Castellanos MD;  Location: [facility];  Service: General;  Laterality: Left;  port placed to right chest , tunneled over to left chest, subclavian    WISDOM TOOTH EXTRACTION      x2     Social History     Socioeconomic History    Marital status:    Tobacco Use    Smoking status:  Former     Types: Cigarettes     Start date:      Quit date:      Years since quittin.9    Smokeless tobacco: Never   Substance and Sexual Activity    Alcohol use: Not Currently     Comment: social    Drug use: Never     Social Drivers of Health     Physical Activity: Unknown (10/30/2024)    Received from [facility]    Exercise Vital Sign     Days of Exercise per Week: 5 days     Family History   Problem Relation Name Age of Onset    Cancer Mother          liposarcoma on leg    Prostate cancer Father      Colon polyps Father      Leukemia Niece       Medication List with Changes/Refills   Current Medications    AMLODIPINE (NORVASC) 5 MG TABLET    Take 5 mg by mouth once daily.    CELECOXIB (CELEBREX) 200 MG CAPSULE    SMARTSI Capsule(s) By Mouth Every 12 Hours PRN    HYDRALAZINE (APRESOLINE) 25 MG TABLET    Take 1 tablet (25 mg total) by mouth 3 (three) times daily as needed (Severely elevated blood pressure above 190/100).    IBUPROFEN (ADVIL,MOTRIN) 200 MG TABLET    Take 200 mg by mouth every 6 (six) hours as needed for Pain.    LORATADINE-PSEUDOEPHEDRINE 5-120 MG (CLARITIN-D 12 HOUR) 5-120 MG PER TABLET    Take 1 tablet by mouth daily as needed.    LOSARTAN (COZAAR) 100 MG TABLET    Take 1 tablet by mouth once daily.    OMEPRAZOLE (PRILOSEC) 40 MG CAPSULE    Take 1 capsule by mouth every morning.    ONDANSETRON (ZOFRAN) 8 MG TABLET    Take 1 tablet (8 mg total) by mouth every 8 (eight) hours as needed for Nausea.    OXYCODONE-ACETAMINOPHEN (PERCOCET) 5-325 MG PER TABLET    Take 1 tablet by mouth every 6 (six) hours as needed.    PROMETHAZINE (PHENERGAN) 25 MG TABLET    Take 1 tablet (25 mg total) by mouth every 4 to 6 hours as needed for Nausea.    TESTOSTERONE CYPIONATE (DEPOTESTOTERONE CYPIONATE) 200 MG/ML INJECTION    Inject 1 mL into the muscle every 14 (fourteen) days.     Review of patient's allergies indicates:  No Known Allergies    QUALITY OF LIFE: 90%- Able to Carry on Normal Activity:  Minor Symptoms of Disease    Vitals:    12/11/24 1450   BP: (!) 150/89   Pulse: 107   Resp: 16   SpO2: 98%   Weight: 100.3 kg (221 lb 1.6 oz)   PainSc:   4   PainLoc: Rectum     Body mass index is 27.64 kg/m².    PHYSICAL EXAM:   GENERAL: alert; in no apparent distress.   HEAD: normocephalic, atraumatic.  EYES: pupils are equal, round, reactive to light and accommodation. Sclera anicteric. Conjunctiva not injected.   NOSE/THROAT: no nasal erythema or rhinorrhea. Oropharynx pink, without erythema, ulcerations or thrush.   NECK: no cervical motion rigidity; supple with no masses.  CHEST: Patient is speaking comfortably on room air with normal work of breathing without using accessory muscles of respiration.  CARDIOVASCULAR: regular rate and rhythm  ABDOMEN: soft, nontender, nondistended.   MUSCULOSKELETAL: no tenderness to palpation along the spine or scapulae. Normal range of motion.  NEUROLOGIC: cranial nerves II-XII intact bilaterally. Strength 5/5 in bilateral upper and lower extremities. No sensory deficits appreciated. Normal gait.  EXTREMITIES: no clubbing, cyanosis, edema.  SKIN: no erythema, rashes, ulcerations noted.     ANCILLARY DATA: All images reviewed personally by dictating physician.  11/7/24 [place]  1.   Small enhancing lesions as detailed above demonstrating imaging characteristics most suggestive of benign hemangiomas or perfusion anomalies. There is no convincing suspicious hepatic lesion at this time. However, given the patient's risk factors, consider at least a short-term follow-up study in 3-6 months with liver mass protocol CT or MRI to ensure stability.   2.   Other findings as discussed above.     10/24 CEA 5.9    10/23/24 US   Impression:  No evidence of hepatic mass to score spine to lesion seen on recent MRI  Limited visualization of the pancreas  No evidence of gallstones    10/17/24 PET  Impression:  Persistent hypermetabolic low rectal lesion in this patient with rectal  cancer.  Lesion is mildly decreased in size and intensity of uptake compared to prior exam.  No definite evidence of hypermetabolic metastasis.  Persistent small mildly hypermetabolic focus of subcutaneous soft tissue thickening in the right gluteal region.  Finding is nonspecific however favor sequela of soft tissue infection/inflammation.  Attention on follow-up.  Stable bilateral subcentimeter pulmonary nodules, below the size threshold for PET.  Attention on follow-up.    10/17/24 MRI A  Impression:  The 2 original PET CT positive lesions of the liver parenchyma are significantly shrunk in size.  There is a stable 9 mm lesion on the inferior surface of the left lobe, unchanged but not PET CT positive or contrast enhancing.  A 1.2 cm cavernous hemangioma seen adjacent to the gallbladder.  There are multiple stable 2 and 3 mm lesions identified on the most sensitive scans which are unchanged and may represent small cysts.  An enlarging contrast enhancing mass is not seen.  The rest of the MR of the abdomen is negative.    10/17/24 MRI R  Impression:  *TUMOR  Compared to06/27/2024, post treatment primary tumor assessment: Probable Incomplete response (likely residual tumor).  Partial favorable response with decrease in size of the primary neoplasm and some interval fibrosis (less than 50%).  *NODES  Suspicious Mesorectal Lymph nodes: No  Suspicious Extramesorectal Lymph nodes: No     ASSESSMENT: Yong Arenas is a male with stage T2N0M1 adenoca of distal rectum  PLAN:     - Unable to review path report from transanal excision.  Patient will follow-up with Dr. Grubbs to discuss liver resection.  - Studies:  Advised rigid surveillance with serial CEA (wnl), ctDNA (pending) and imaging to include PET, MRI versus TP CT (liver), MRI rectal +endoscopy  - Follow up with Alpesh Andrade  - advised resumed Celebrex q.12 hours per Dr. Betts, stool softeners, aggressive electrolyte rich fluid intake and  topical hydrocortisone +/- lidocaine to assist with painful bowel movements  - Return to clinic prn.    All questions answered and contact information provided. Patient understands free to call us anytime with any questions or concerns regarding radiation therapy.    I have personally seen and evaluated this patient with a moderate to high complexity diagnosis.      60 minutes were dedicated to reviewing/interpreting pertinent laboratory/imaging/pathology as well as follow-up with concurrent consultants; reviewing and performing history and physical; counseling patient on continuing oncologic recommendations; documentation in the electronic medical record including ordering of additional tests and/or radiation treatment protocol; and coordination of care with physicians with referrals placed as appropriate.    PHYSICIAN: Tucker Arellano Jr, MD

## 2024-12-12 ENCOUNTER — PATIENT MESSAGE (OUTPATIENT)
Facility: CLINIC | Age: 44
End: 2024-12-12
Payer: COMMERCIAL

## 2024-12-12 ENCOUNTER — TELEPHONE (OUTPATIENT)
Facility: CLINIC | Age: 44
End: 2024-12-12
Payer: COMMERCIAL

## 2024-12-12 DIAGNOSIS — D70.1 CHEMOTHERAPY INDUCED NEUTROPENIA: ICD-10-CM

## 2024-12-12 DIAGNOSIS — T45.1X5A CHEMOTHERAPY INDUCED NEUTROPENIA: ICD-10-CM

## 2024-12-12 DIAGNOSIS — C20 RECTAL ADENOCARCINOMA METASTATIC TO LIVER: Primary | ICD-10-CM

## 2024-12-12 DIAGNOSIS — C78.7 RECTAL ADENOCARCINOMA METASTATIC TO LIVER: Primary | ICD-10-CM

## 2024-12-19 ENCOUNTER — OFFICE VISIT (OUTPATIENT)
Facility: CLINIC | Age: 44
End: 2024-12-19
Payer: COMMERCIAL

## 2024-12-19 ENCOUNTER — TELEPHONE (OUTPATIENT)
Facility: CLINIC | Age: 44
End: 2024-12-19

## 2024-12-19 VITALS
HEIGHT: 75 IN | TEMPERATURE: 98 F | SYSTOLIC BLOOD PRESSURE: 158 MMHG | BODY MASS INDEX: 27.66 KG/M2 | HEART RATE: 84 BPM | WEIGHT: 222.5 LBS | DIASTOLIC BLOOD PRESSURE: 97 MMHG | RESPIRATION RATE: 17 BRPM

## 2024-12-19 DIAGNOSIS — C20 RECTAL ADENOCARCINOMA METASTATIC TO LIVER: ICD-10-CM

## 2024-12-19 DIAGNOSIS — C78.7 RECTAL ADENOCARCINOMA METASTATIC TO LIVER: ICD-10-CM

## 2024-12-19 DIAGNOSIS — C78.7 METASTASIS TO LIVER: Primary | ICD-10-CM

## 2024-12-19 DIAGNOSIS — K60.2 ANAL FISSURE: ICD-10-CM

## 2024-12-19 PROCEDURE — 3077F SYST BP >= 140 MM HG: CPT | Mod: CPTII,S$GLB,, | Performed by: INTERNAL MEDICINE

## 2024-12-19 PROCEDURE — 1159F MED LIST DOCD IN RCRD: CPT | Mod: CPTII,S$GLB,, | Performed by: INTERNAL MEDICINE

## 2024-12-19 PROCEDURE — 4010F ACE/ARB THERAPY RXD/TAKEN: CPT | Mod: CPTII,S$GLB,, | Performed by: INTERNAL MEDICINE

## 2024-12-19 PROCEDURE — 99215 OFFICE O/P EST HI 40 MIN: CPT | Mod: S$GLB,,, | Performed by: INTERNAL MEDICINE

## 2024-12-19 PROCEDURE — 3080F DIAST BP >= 90 MM HG: CPT | Mod: CPTII,S$GLB,, | Performed by: INTERNAL MEDICINE

## 2024-12-19 PROCEDURE — 99999 PR PBB SHADOW E&M-EST. PATIENT-LVL IV: CPT | Mod: PBBFAC,,, | Performed by: INTERNAL MEDICINE

## 2024-12-19 PROCEDURE — G2211 COMPLEX E/M VISIT ADD ON: HCPCS | Mod: S$GLB,,, | Performed by: INTERNAL MEDICINE

## 2024-12-19 PROCEDURE — 3008F BODY MASS INDEX DOCD: CPT | Mod: CPTII,S$GLB,, | Performed by: INTERNAL MEDICINE

## 2024-12-19 RX ORDER — OXYCODONE AND ACETAMINOPHEN 5; 325 MG/1; MG/1
1 TABLET ORAL EVERY 6 HOURS PRN
Qty: 28 TABLET | Refills: 0 | Status: SHIPPED | OUTPATIENT
Start: 2024-12-19 | End: 2024-12-26

## 2024-12-19 NOTE — TELEPHONE ENCOUNTER
Spoke to CARLOS Leach in Dr. Betts's office [phone] requesting for [facility] ctDNA.  She explained that she did not have, but will call and inquire.  I provided our fax number for her convenience.

## 2024-12-19 NOTE — PROGRESS NOTES
[facility] [address] Hematology Oncology In Office Subsequent Encounter Note    12/19/24    Subjective:       Patient ID: Yong Arenas is a 44 y.o. male returning today for a regularly scheduled follow-up visit.    Chief Complaint: Rectal Cancer with liver mets.       HPI  Rectal cancer, liver metastases, LN.  Treated with chemo.  Folfox Avastin x's 5.  PET MRI slight decrease in rectal mass, larger liver masses resolved, smaller nodules no change.  CEA 36 to 5.    He has completed the radiation therapy to the pelvis and the concurrent weekly 5 FU infusional chemotherapy.  Dr. Arellano has been his treating radiation physician.    He has completed 5 cycles of chemotherapy with Avastin.  He will resume his chemotherapy and Avastin on September 9, September 23rd, and October 7th to complete 8 cycles of chemotherapy.  Avastin will be held from course number 8 on October 7th in anticipation of his upcoming surgery.    Plan to get MRI of abdomen and pelvis and PET scan around October 14th/15th.  Anticipate that his surgery would be accomplished after October 23rd.  Dr. Rudd had seen him for evaluation of liver metastasectomy and Dr. Sanchez has seen him for rectal surgery.  It is unclear if he will need a low anterior resection or APR.    He is 224 lb and 6 ft 3 in tall.  He is due to have 2 teeth pulled electively and a placement of 2 posts is anticipated with some type of implant or bridge.  This was due to be placed around October 31,  But may be deferred 2 or 3 weeks until after his surgery.    S/P 6 of 8 cycles,  He has some PN sx in his fingertips and toes, should resolve after chemo completed.    Chemo given with out avastin.  PET and MRI no hypermetabolism in liver masses, residual nodules seen in liver, smaller, rectal mass 50% reduction.  He met with Dr. Zamora, to be presented to [facility] Tumor board.  MD advised APR, ostomy.  Pt's insurance lapses 11/17/24.  Pt wants to go for 2nd opinion.  Discussed with   Rupal, will work on this.  Check U/S of liver.    Rea to have hemangioma in liver.  Pt refused APR and LAR surgery because he would not agree to ostomy if needed.  He had transanal  resection of the cancer mass on Friday 11/15/24.  Path is pending.  I discussed with Dr. Paola Betts.  [facility].    Pending ctDNA and path report.  S/P 8 cycles of chemo. And chemo/Rad Rx.  He has developed an anal fissure and is due for a flexible sigmoidoscopy per Dr. Vides 2025.  He is due for a PET scan in 2025.  CT DNA is pending.  He follows up here towards the end of 2025.        Past Medical History:   Diagnosis Date    Back pain     Cervical pain     GERD (gastroesophageal reflux disease)     HLD (hyperlipidemia)     Hypertension     Rectal cancer 2024    Tinnitus, unspecified ear        Past Surgical History:   Procedure Laterality Date    EGD, WITH BALLOON DILATION OF LESS THAN 30 MILLIMETERS      INSERTION OF TUNNELED CENTRAL VENOUS CATHETER (CVC) WITH SUBCUTANEOUS PORT Left 2024    Procedure: INSERTION, PORT-A-CATH;  Surgeon: Tash Castellanos MD;  Location: [facility];  Service: General;  Laterality: Left;  port placed to right chest , tunneled over to left chest, subclavian    WISDOM TOOTH EXTRACTION      x2       Social History     Socioeconomic History    Marital status:    Tobacco Use    Smoking status: Former     Types: Cigarettes     Start date:      Quit date:      Years since quittin.0    Smokeless tobacco: Never   Substance and Sexual Activity    Alcohol use: Not Currently     Comment: social    Drug use: Never     Social Drivers of Health     Physical Activity: Unknown (10/30/2024)    Received from [facility]    Exercise Vital Sign     Days of Exercise per Week: 5 days       Family History   Problem Relation Name Age of Onset    Cancer Mother          liposarcoma on leg    Prostate cancer Father      Colon polyps Father       Leukemia Niece         Review of patient's allergies indicates:  No Known Allergies    Current Outpatient Medications:     amLODIPine (NORVASC) 5 MG tablet, Take 5 mg by mouth once daily., Disp: , Rfl:     celecoxib (CELEBREX) 200 MG capsule, SMARTSI Capsule(s) By Mouth Every 12 Hours PRN, Disp: , Rfl:     hydrALAZINE (APRESOLINE) 25 MG tablet, Take 1 tablet (25 mg total) by mouth 3 (three) times daily as needed (Severely elevated blood pressure above 190/100)., Disp: 30 tablet, Rfl: 0    ibuprofen (ADVIL,MOTRIN) 200 MG tablet, Take 200 mg by mouth every 6 (six) hours as needed for Pain., Disp: , Rfl:     loratadine-pseudoephedrine 5-120 mg (CLARITIN-D 12 HOUR) 5-120 mg per tablet, Take 1 tablet by mouth daily as needed., Disp: , Rfl:     losartan (COZAAR) 100 MG tablet, Take 1 tablet by mouth once daily., Disp: , Rfl:     omeprazole (PRILOSEC) 40 MG capsule, Take 1 capsule by mouth every morning., Disp: , Rfl:     ondansetron (ZOFRAN) 8 MG tablet, Take 1 tablet (8 mg total) by mouth every 8 (eight) hours as needed for Nausea., Disp: 30 tablet, Rfl: 2    promethazine (PHENERGAN) 25 MG tablet, Take 1 tablet (25 mg total) by mouth every 4 to 6 hours as needed for Nausea., Disp: 30 tablet, Rfl: 2    testosterone cypionate (DEPOTESTOTERONE CYPIONATE) 200 mg/mL injection, Inject 1 mL into the muscle every 14 (fourteen) days., Disp: , Rfl:     All medications and past history have been reviewed.    Review of Systems   Constitutional:  Positive for fatigue. Negative for activity change, appetite change and fever.   HENT:  Negative for congestion, mouth sores, postnasal drip, rhinorrhea, sinus pressure, sore throat and trouble swallowing.    Eyes:  Negative for photophobia and visual disturbance.   Respiratory:  Negative for cough, chest tightness, shortness of breath and wheezing.    Cardiovascular:  Negative for chest pain and leg swelling.   Gastrointestinal:  Negative for abdominal distention, abdominal pain,  "constipation, diarrhea, nausea and vomiting.   Endocrine: Negative for cold intolerance.   Genitourinary:  Negative for decreased urine volume, dysuria and frequency.   Musculoskeletal:  Negative for arthralgias and myalgias.   Skin:  Negative for pallor and rash.   Allergic/Immunologic: Negative for immunocompromised state.   Neurological:  Negative for dizziness, syncope, weakness, light-headedness, numbness and headaches.   Hematological:  Negative for adenopathy. Does not bruise/bleed easily.   Psychiatric/Behavioral:  Negative for sleep disturbance. The patient is not nervous/anxious.        Objective:        BP (!) 158/97 (BP Location: Right arm, Patient Position: Sitting)   Pulse 84   Temp 97.5 °F (36.4 °C) (Temporal)   Resp 17   Ht 6' 3" (1.905 m)   Wt 100.9 kg (222 lb 8 oz)   BMI 27.81 kg/m²     Physical Exam  Constitutional:       Appearance: Normal appearance.   HENT:      Head: Normocephalic and atraumatic.      Mouth/Throat:      Mouth: Mucous membranes are moist.   Cardiovascular:      Rate and Rhythm: Normal rate and regular rhythm.      Pulses: Normal pulses.      Heart sounds: Normal heart sounds.   Pulmonary:      Effort: Pulmonary effort is normal. No respiratory distress.      Breath sounds: Normal breath sounds. No wheezing.   Abdominal:      General: There is no distension.      Palpations: Abdomen is soft. There is no mass.      Tenderness: There is no abdominal tenderness.   Musculoskeletal:         General: No swelling. Normal range of motion.      Right lower leg: No edema.      Left lower leg: No edema.   Skin:     General: Skin is warm and dry.      Capillary Refill: Capillary refill takes 2 to 3 seconds.      Findings: No bruising or rash.   Neurological:      Mental Status: He is alert and oriented to person, place, and time. Mental status is at baseline.      Motor: No weakness.   Psychiatric:         Mood and Affect: Mood normal.         Behavior: Behavior normal.             No " results found for this or any previous visit (from the past 2 weeks).    CMP  Sodium   Date Value Ref Range Status   08/23/2024 139 136 - 145 mmol/L Final     Potassium   Date Value Ref Range Status   08/23/2024 3.7 3.5 - 5.1 mmol/L Final     Chloride   Date Value Ref Range Status   08/23/2024 107 95 - 110 mmol/L Final     CO2   Date Value Ref Range Status   08/23/2024 24 23 - 29 mmol/L Final     Glucose   Date Value Ref Range Status   08/23/2024 95 70 - 110 mg/dL Final     BUN   Date Value Ref Range Status   08/23/2024 14 6 - 20 mg/dL Final     Creatinine   Date Value Ref Range Status   08/23/2024 0.8 0.5 - 1.4 mg/dL Final     Calcium   Date Value Ref Range Status   08/23/2024 9.3 8.7 - 10.5 mg/dL Final     Total Protein   Date Value Ref Range Status   08/23/2024 7.1 6.0 - 8.4 g/dL Final     Albumin   Date Value Ref Range Status   08/23/2024 4.3 3.5 - 5.2 g/dL Final     Total Bilirubin   Date Value Ref Range Status   08/23/2024 0.4 0.1 - 1.0 mg/dL Final     Comment:     For infants and newborns, interpretation of results should be based  on gestational age, weight and in agreement with clinical  observations.    Premature Infant recommended reference ranges:  Up to 24 hours.............<8.0 mg/dL  Up to 48 hours............<12.0 mg/dL  3-5 days..................<15.0 mg/dL  6-29 days.................<15.0 mg/dL       Alkaline Phosphatase   Date Value Ref Range Status   08/23/2024 70 55 - 135 U/L Final     AST   Date Value Ref Range Status   08/23/2024 23 10 - 40 U/L Final     ALT   Date Value Ref Range Status   08/23/2024 31 10 - 44 U/L Final     Anion Gap   Date Value Ref Range Status   08/23/2024 8 8 - 16 mmol/L Final         Results for orders placed or performed during the hospital encounter of 10/17/24 (from the past 2160 hours)   NM PET CT FDG Skull Base to Mid Thigh    Impression    Persistent hypermetabolic low rectal lesion in this patient with rectal cancer.  Lesion is mildly decreased in size and  intensity of uptake compared to prior exam.  No definite evidence of hypermetabolic metastasis.    Persistent small mildly hypermetabolic focus of subcutaneous soft tissue thickening in the right gluteal region.  Finding is nonspecific however favor sequela of soft tissue infection/inflammation.  Attention on follow-up.    Stable bilateral subcentimeter pulmonary nodules, below the size threshold for PET.  Attention on follow-up.      Electronically signed by: David Mark  Date:    10/17/2024  Time:    13:51       No results found for this or any previous visit (from the past 2160 hours).                 Plan is to discuss and decide on timing of chemo further, Rad Rx and surgery.    Addendum:  Coordinated care with Dr. Rudd, colorectal surgery and Dr. Arellano and myself.    Completed pelvic irradiation and 5FU infusion.    Folfox Avastin.    MRI and PET as per reportsand HPI above.    Followed by surgical re evaluation. In progress with Dr. Zamora and [facility] Tumor Board.    Being considered for intrahepatic TACE Rx, liver transplant?  Dr. Grubbs.    S/P transanal resection of tumor mass.  Path report and ctDNA pending.    RTC see me 4 weeks.

## 2024-12-30 ENCOUNTER — TELEPHONE (OUTPATIENT)
Facility: CLINIC | Age: 44
End: 2024-12-30
Payer: COMMERCIAL

## 2024-12-31 NOTE — TELEPHONE ENCOUNTER
ALEXA GAITAN.  Please review his surgery procedure and path report.  Any further Rad Rx planned?    Bran Calabrese MD  12/30/24   Aklief counseling:  Patient advised to apply a pea-sized amount only at bedtime and wait 30 minutes after washing their face before applying.  If too drying, patient may add a non-comedogenic moisturizer.  The most commonly reported side effects including irritation, redness, scaling, dryness, stinging, burning, itching, and increased risk of sunburn.  The patient verbalized understanding of the proper use and possible adverse effects of retinoids.  All of the patient's questions and concerns were addressed.

## 2025-01-15 ENCOUNTER — INFUSION (OUTPATIENT)
Dept: INFUSION THERAPY | Facility: HOSPITAL | Age: 45
End: 2025-01-15
Attending: INTERNAL MEDICINE
Payer: COMMERCIAL

## 2025-01-15 VITALS
RESPIRATION RATE: 18 BRPM | HEART RATE: 100 BPM | HEIGHT: 75 IN | SYSTOLIC BLOOD PRESSURE: 145 MMHG | WEIGHT: 218.19 LBS | TEMPERATURE: 98 F | DIASTOLIC BLOOD PRESSURE: 94 MMHG | OXYGEN SATURATION: 100 % | BODY MASS INDEX: 27.13 KG/M2

## 2025-01-15 DIAGNOSIS — C78.7 RECTAL ADENOCARCINOMA METASTATIC TO LIVER: ICD-10-CM

## 2025-01-15 DIAGNOSIS — C20 RECTAL ADENOCARCINOMA METASTATIC TO LIVER: ICD-10-CM

## 2025-01-15 DIAGNOSIS — T45.1X5A CHEMOTHERAPY INDUCED NEUTROPENIA: ICD-10-CM

## 2025-01-15 DIAGNOSIS — D70.1 CHEMOTHERAPY INDUCED NEUTROPENIA: ICD-10-CM

## 2025-01-15 DIAGNOSIS — C78.7 METASTASIS TO LIVER: Primary | ICD-10-CM

## 2025-01-15 DIAGNOSIS — E86.0 DEHYDRATION: ICD-10-CM

## 2025-01-15 DIAGNOSIS — C20 RECTAL CANCER METASTASIZED TO LIVER: ICD-10-CM

## 2025-01-15 DIAGNOSIS — C20 ADENOCARCINOMA OF RECTUM: ICD-10-CM

## 2025-01-15 DIAGNOSIS — C78.7 RECTAL CANCER METASTASIZED TO LIVER: ICD-10-CM

## 2025-01-15 LAB
ALBUMIN SERPL BCP-MCNC: 4.1 G/DL (ref 3.5–5.2)
ALP SERPL-CCNC: 62 U/L (ref 55–135)
ALT SERPL W/O P-5'-P-CCNC: 30 U/L (ref 10–44)
ANION GAP SERPL CALC-SCNC: 8 MMOL/L (ref 8–16)
AST SERPL-CCNC: 22 U/L (ref 10–40)
BASOPHILS # BLD AUTO: 0.03 K/UL (ref 0–0.2)
BASOPHILS NFR BLD: 0.6 % (ref 0–1.9)
BILIRUB SERPL-MCNC: 0.4 MG/DL (ref 0.1–1)
BUN SERPL-MCNC: 10 MG/DL (ref 6–20)
CALCIUM SERPL-MCNC: 9 MG/DL (ref 8.7–10.5)
CEA SERPL-MCNC: 4.6 NG/ML (ref 0–5)
CHLORIDE SERPL-SCNC: 103 MMOL/L (ref 95–110)
CO2 SERPL-SCNC: 26 MMOL/L (ref 23–29)
CREAT SERPL-MCNC: 0.9 MG/DL (ref 0.5–1.4)
DIFFERENTIAL METHOD BLD: ABNORMAL
EOSINOPHIL # BLD AUTO: 0.3 K/UL (ref 0–0.5)
EOSINOPHIL NFR BLD: 6.6 % (ref 0–8)
ERYTHROCYTE [DISTWIDTH] IN BLOOD BY AUTOMATED COUNT: 12.8 % (ref 11.5–14.5)
EST. GFR  (NO RACE VARIABLE): >60 ML/MIN/1.73 M^2
GLUCOSE SERPL-MCNC: 203 MG/DL (ref 70–110)
HCT VFR BLD AUTO: 39.3 % (ref 40–54)
HGB BLD-MCNC: 13.2 G/DL (ref 14–18)
IMM GRANULOCYTES # BLD AUTO: 0.01 K/UL (ref 0–0.04)
IMM GRANULOCYTES NFR BLD AUTO: 0.2 % (ref 0–0.5)
LYMPHOCYTES # BLD AUTO: 0.7 K/UL (ref 1–4.8)
LYMPHOCYTES NFR BLD: 14.1 % (ref 18–48)
MCH RBC QN AUTO: 29.7 PG (ref 27–31)
MCHC RBC AUTO-ENTMCNC: 33.6 G/DL (ref 32–36)
MCV RBC AUTO: 88 FL (ref 82–98)
MONOCYTES # BLD AUTO: 0.3 K/UL (ref 0.3–1)
MONOCYTES NFR BLD: 5.4 % (ref 4–15)
NEUTROPHILS # BLD AUTO: 3.6 K/UL (ref 1.8–7.7)
NEUTROPHILS NFR BLD: 73.1 % (ref 38–73)
NRBC BLD-RTO: 0 /100 WBC
PLATELET # BLD AUTO: 201 K/UL (ref 150–450)
PMV BLD AUTO: 9 FL (ref 9.2–12.9)
POTASSIUM SERPL-SCNC: 3.7 MMOL/L (ref 3.5–5.1)
PROT SERPL-MCNC: 6.8 G/DL (ref 6–8.4)
RBC # BLD AUTO: 4.45 M/UL (ref 4.6–6.2)
SODIUM SERPL-SCNC: 137 MMOL/L (ref 136–145)
WBC # BLD AUTO: 4.98 K/UL (ref 3.9–12.7)

## 2025-01-15 PROCEDURE — 36591 DRAW BLOOD OFF VENOUS DEVICE: CPT

## 2025-01-15 PROCEDURE — 25000003 PHARM REV CODE 250: Performed by: INTERNAL MEDICINE

## 2025-01-15 PROCEDURE — 36592 COLLECT BLOOD FROM PICC: CPT

## 2025-01-15 PROCEDURE — 85025 COMPLETE CBC W/AUTO DIFF WBC: CPT | Performed by: INTERNAL MEDICINE

## 2025-01-15 PROCEDURE — 82378 CARCINOEMBRYONIC ANTIGEN: CPT | Performed by: INTERNAL MEDICINE

## 2025-01-15 PROCEDURE — 80053 COMPREHEN METABOLIC PANEL: CPT | Performed by: INTERNAL MEDICINE

## 2025-01-15 PROCEDURE — 63600175 PHARM REV CODE 636 W HCPCS: Performed by: INTERNAL MEDICINE

## 2025-01-15 PROCEDURE — A4216 STERILE WATER/SALINE, 10 ML: HCPCS | Performed by: INTERNAL MEDICINE

## 2025-01-15 RX ORDER — SODIUM CHLORIDE 0.9 % (FLUSH) 0.9 %
10 SYRINGE (ML) INJECTION
OUTPATIENT
Start: 2025-01-15

## 2025-01-15 RX ORDER — HEPARIN 100 UNIT/ML
500 SYRINGE INTRAVENOUS
OUTPATIENT
Start: 2025-01-15

## 2025-01-15 RX ORDER — SODIUM CHLORIDE 0.9 % (FLUSH) 0.9 %
10 SYRINGE (ML) INJECTION
Status: DISCONTINUED | OUTPATIENT
Start: 2025-01-15 | End: 2025-01-15 | Stop reason: HOSPADM

## 2025-01-15 RX ORDER — HEPARIN 100 UNIT/ML
500 SYRINGE INTRAVENOUS
Status: DISCONTINUED | OUTPATIENT
Start: 2025-01-15 | End: 2025-01-15 | Stop reason: HOSPADM

## 2025-01-15 RX ADMIN — HEPARIN 500 UNITS: 100 SYRINGE at 03:01

## 2025-01-15 RX ADMIN — SODIUM CHLORIDE, PRESERVATIVE FREE 10 ML: 5 INJECTION INTRAVENOUS at 03:01

## 2025-01-15 NOTE — PLAN OF CARE
Problem: Fall Injury Risk  Goal: Absence of Fall and Fall-Related Injury  Outcome: Progressing  Intervention: Identify and Manage Contributors  Flowsheets (Taken 1/15/2025 1503)  Medication Review/Management: medications reviewed  Intervention: Promote Injury-Free Environment  Flowsheets (Taken 1/15/2025 1503)  Safety Promotion/Fall Prevention: assistive device/personal item within reach

## 2025-01-27 ENCOUNTER — OFFICE VISIT (OUTPATIENT)
Facility: CLINIC | Age: 45
End: 2025-01-27
Payer: COMMERCIAL

## 2025-01-27 VITALS
SYSTOLIC BLOOD PRESSURE: 145 MMHG | BODY MASS INDEX: 26.86 KG/M2 | DIASTOLIC BLOOD PRESSURE: 88 MMHG | HEART RATE: 96 BPM | HEIGHT: 75 IN | RESPIRATION RATE: 16 BRPM | WEIGHT: 216 LBS | TEMPERATURE: 97 F

## 2025-01-27 DIAGNOSIS — C78.7 RECTAL ADENOCARCINOMA METASTATIC TO LIVER: Primary | ICD-10-CM

## 2025-01-27 DIAGNOSIS — C20 RECTAL ADENOCARCINOMA METASTATIC TO LIVER: Primary | ICD-10-CM

## 2025-01-27 DIAGNOSIS — C78.7 METASTASIS TO LIVER: ICD-10-CM

## 2025-01-27 DIAGNOSIS — D50.0 IRON DEFICIENCY ANEMIA DUE TO CHRONIC BLOOD LOSS: ICD-10-CM

## 2025-01-27 PROCEDURE — 1159F MED LIST DOCD IN RCRD: CPT | Mod: CPTII,S$GLB,, | Performed by: INTERNAL MEDICINE

## 2025-01-27 PROCEDURE — 3079F DIAST BP 80-89 MM HG: CPT | Mod: CPTII,S$GLB,, | Performed by: INTERNAL MEDICINE

## 2025-01-27 PROCEDURE — 3008F BODY MASS INDEX DOCD: CPT | Mod: CPTII,S$GLB,, | Performed by: INTERNAL MEDICINE

## 2025-01-27 PROCEDURE — 3077F SYST BP >= 140 MM HG: CPT | Mod: CPTII,S$GLB,, | Performed by: INTERNAL MEDICINE

## 2025-01-27 PROCEDURE — G2211 COMPLEX E/M VISIT ADD ON: HCPCS | Mod: S$GLB,,, | Performed by: INTERNAL MEDICINE

## 2025-01-27 PROCEDURE — 99215 OFFICE O/P EST HI 40 MIN: CPT | Mod: S$GLB,,, | Performed by: INTERNAL MEDICINE

## 2025-01-27 PROCEDURE — 4010F ACE/ARB THERAPY RXD/TAKEN: CPT | Mod: CPTII,S$GLB,, | Performed by: INTERNAL MEDICINE

## 2025-01-27 PROCEDURE — 99999 PR PBB SHADOW E&M-EST. PATIENT-LVL IV: CPT | Mod: PBBFAC,,, | Performed by: INTERNAL MEDICINE

## 2025-01-27 NOTE — PROGRESS NOTES
[facility] [address] Hematology Oncology In Office Subsequent Encounter Note    1/27/25    Subjective:       Patient ID: Yong Arenas is a 44 y.o. male returning today for a regularly scheduled follow-up visit.    Chief Complaint: Rectal Cancer with liver mets.       HPI  Rectal cancer, liver metastases, LN.  Treated with chemo.  Folfox Avastin x's 5.  PET MRI slight decrease in rectal mass, larger liver masses resolved, smaller nodules no change.  CEA 36 to 5.    He has completed the radiation therapy to the pelvis and the concurrent weekly 5 FU infusional chemotherapy.  Dr. Arellano has been his treating radiation physician.    He has completed 5 cycles of chemotherapy with Avastin.  He will resume his chemotherapy and Avastin on September 9, September 23rd, and October 7th to complete 8 cycles of chemotherapy.  Avastin will be held from course number 8 on October 7th in anticipation of his upcoming surgery.    Plan to get MRI of abdomen and pelvis and PET scan around October 14th/15th.  Anticipate that his surgery would be accomplished after October 23rd.  Dr. Rudd had seen him for evaluation of liver metastasectomy and Dr. Sanchez has seen him for rectal surgery.  It is unclear if he will need a low anterior resection or APR.    He is 224 lb and 6 ft 3 in tall.  He is due to have 2 teeth pulled electively and a placement of 2 posts is anticipated with some type of implant or bridge.  This was due to be placed around October 31,  But may be deferred 2 or 3 weeks until after his surgery.    S/P 6 of 8 cycles,  He has some PN sx in his fingertips and toes, should resolve after chemo completed.    Chemo given with out avastin.  PET and MRI no hypermetabolism in liver masses, residual nodules seen in liver, smaller, rectal mass 50% reduction.  He met with Dr. Zamora, to be presented to [facility] Tumor board.  MD advised APR, ostomy.  Pt's insurance lapses 11/17/24.  Pt wants to go for 2nd opinion.  Discussed with   Rupal, will work on this.  Check U/S of liver.    [place] to have hemangioma in liver.  Pt refused APR and LAR surgery because he would not agree to ostomy if needed.  He had transanal  resection of the cancer mass on Friday 11/15/24.  Path is pending.  I discussed with Dr. Paola Betts.  [facility].    Pending ctDNA and path report.  S/P 8 cycles of chemo. And chemo/Rad Rx.  He has developed an anal fissure and is due for a flexible sigmoidoscopy per Dr. Vides 2025.  Rectal pain sx are better  He is due for a PET scan in 2025.  And MRI of pelvis.  CT DNA is negative from 24.        Past Medical History:   Diagnosis Date    Back pain     Cervical pain     GERD (gastroesophageal reflux disease)     HLD (hyperlipidemia)     Hypertension     Rectal cancer 2024    Tinnitus, unspecified ear        Past Surgical History:   Procedure Laterality Date    EGD, WITH BALLOON DILATION OF LESS THAN 30 MILLIMETERS      INSERTION OF TUNNELED CENTRAL VENOUS CATHETER (CVC) WITH SUBCUTANEOUS PORT Left 2024    Procedure: INSERTION, PORT-A-CATH;  Surgeon: Tash Castellanos MD;  Location: [facility];  Service: General;  Laterality: Left;  port placed to right chest , tunneled over to left chest, subclavian    WISDOM TOOTH EXTRACTION      x2       Social History     Socioeconomic History    Marital status:    Tobacco Use    Smoking status: Former     Types: Cigarettes     Start date:      Quit date:      Years since quittin.0    Smokeless tobacco: Never   Substance and Sexual Activity    Alcohol use: Not Currently     Comment: social    Drug use: Never     Social Drivers of Health     Physical Activity: Unknown (10/30/2024)    Received from [facility]    Exercise Vital Sign     Days of Exercise per Week: 5 days       Family History   Problem Relation Name Age of Onset    Cancer Mother          liposarcoma on leg    Prostate cancer Father      Colon polyps  Father      Leukemia Niece         Review of patient's allergies indicates:  No Known Allergies    Current Outpatient Medications:     amLODIPine (NORVASC) 5 MG tablet, Take 5 mg by mouth once daily., Disp: , Rfl:     celecoxib (CELEBREX) 200 MG capsule, SMARTSI Capsule(s) By Mouth Every 12 Hours PRN, Disp: , Rfl:     hydrALAZINE (APRESOLINE) 25 MG tablet, Take 1 tablet (25 mg total) by mouth 3 (three) times daily as needed (Severely elevated blood pressure above 190/100)., Disp: 30 tablet, Rfl: 0    ibuprofen (ADVIL,MOTRIN) 200 MG tablet, Take 200 mg by mouth every 6 (six) hours as needed for Pain., Disp: , Rfl:     loratadine-pseudoephedrine 5-120 mg (CLARITIN-D 12 HOUR) 5-120 mg per tablet, Take 1 tablet by mouth daily as needed., Disp: , Rfl:     losartan (COZAAR) 100 MG tablet, Take 1 tablet by mouth once daily., Disp: , Rfl:     omeprazole (PRILOSEC) 40 MG capsule, Take 1 capsule by mouth every morning., Disp: , Rfl:     ondansetron (ZOFRAN) 8 MG tablet, Take 1 tablet (8 mg total) by mouth every 8 (eight) hours as needed for Nausea., Disp: 30 tablet, Rfl: 2    promethazine (PHENERGAN) 25 MG tablet, Take 1 tablet (25 mg total) by mouth every 4 to 6 hours as needed for Nausea., Disp: 30 tablet, Rfl: 2    testosterone cypionate (DEPOTESTOTERONE CYPIONATE) 200 mg/mL injection, Inject 1 mL into the muscle every 14 (fourteen) days., Disp: , Rfl:     All medications and past history have been reviewed.    Review of Systems   Constitutional:  Positive for fatigue. Negative for activity change, appetite change and fever.   HENT:  Negative for congestion, mouth sores, postnasal drip, rhinorrhea, sinus pressure, sore throat and trouble swallowing.    Eyes:  Negative for photophobia and visual disturbance.   Respiratory:  Negative for cough, chest tightness, shortness of breath and wheezing.    Cardiovascular:  Negative for chest pain and leg swelling.   Gastrointestinal:  Negative for abdominal distention, abdominal  "pain, constipation, diarrhea, nausea and vomiting.   Endocrine: Negative for cold intolerance.   Genitourinary:  Negative for decreased urine volume, dysuria and frequency.   Musculoskeletal:  Negative for arthralgias and myalgias.   Skin:  Negative for pallor and rash.   Allergic/Immunologic: Negative for immunocompromised state.   Neurological:  Negative for dizziness, syncope, weakness, light-headedness, numbness and headaches.   Hematological:  Negative for adenopathy. Does not bruise/bleed easily.   Psychiatric/Behavioral:  Negative for sleep disturbance. The patient is not nervous/anxious.        Objective:        BP (!) 145/88 (BP Location: Right arm, Patient Position: Sitting)   Pulse 96   Temp 97.4 °F (36.3 °C) (Temporal)   Resp 16   Ht 6' 3" (1.905 m)   Wt 98 kg (216 lb)   BMI 27.00 kg/m²     Physical Exam  Constitutional:       Appearance: Normal appearance.   HENT:      Head: Normocephalic and atraumatic.      Mouth/Throat:      Mouth: Mucous membranes are moist.   Cardiovascular:      Rate and Rhythm: Normal rate and regular rhythm.      Pulses: Normal pulses.      Heart sounds: Normal heart sounds.   Pulmonary:      Effort: Pulmonary effort is normal. No respiratory distress.      Breath sounds: Normal breath sounds. No wheezing.   Abdominal:      General: There is no distension.      Palpations: Abdomen is soft. There is no mass.      Tenderness: There is no abdominal tenderness.   Musculoskeletal:         General: No swelling. Normal range of motion.      Right lower leg: No edema.      Left lower leg: No edema.   Skin:     General: Skin is warm and dry.      Capillary Refill: Capillary refill takes 2 to 3 seconds.      Findings: No bruising or rash.   Neurological:      Mental Status: He is alert and oriented to person, place, and time. Mental status is at baseline.      Motor: No weakness.   Psychiatric:         Mood and Affect: Mood normal.         Behavior: Behavior normal.       "       Recent Results (from the past 2 weeks)   CBC auto differential    Collection Time: 01/15/25  3:08 PM   Result Value Ref Range    WBC 4.98 3.90 - 12.70 K/uL    Hemoglobin 13.2 (L) 14.0 - 18.0 g/dL    Hematocrit 39.3 (L) 40.0 - 54.0 %    Platelets 201 150 - 450 K/uL       CMP  Sodium   Date Value Ref Range Status   01/15/2025 137 136 - 145 mmol/L Final     Potassium   Date Value Ref Range Status   01/15/2025 3.7 3.5 - 5.1 mmol/L Final     Chloride   Date Value Ref Range Status   01/15/2025 103 95 - 110 mmol/L Final     CO2   Date Value Ref Range Status   01/15/2025 26 23 - 29 mmol/L Final     Glucose   Date Value Ref Range Status   01/15/2025 203 (H) 70 - 110 mg/dL Final     BUN   Date Value Ref Range Status   01/15/2025 10 6 - 20 mg/dL Final     Creatinine   Date Value Ref Range Status   01/15/2025 0.9 0.5 - 1.4 mg/dL Final     Calcium   Date Value Ref Range Status   01/15/2025 9.0 8.7 - 10.5 mg/dL Final     Total Protein   Date Value Ref Range Status   01/15/2025 6.8 6.0 - 8.4 g/dL Final     Albumin   Date Value Ref Range Status   01/15/2025 4.1 3.5 - 5.2 g/dL Final     Total Bilirubin   Date Value Ref Range Status   01/15/2025 0.4 0.1 - 1.0 mg/dL Final     Comment:     For infants and newborns, interpretation of results should be based  on gestational age, weight and in agreement with clinical  observations.    Premature Infant recommended reference ranges:  Up to 24 hours.............<8.0 mg/dL  Up to 48 hours............<12.0 mg/dL  3-5 days..................<15.0 mg/dL  6-29 days.................<15.0 mg/dL       Alkaline Phosphatase   Date Value Ref Range Status   01/15/2025 62 55 - 135 U/L Final     AST   Date Value Ref Range Status   01/15/2025 22 10 - 40 U/L Final     ALT   Date Value Ref Range Status   01/15/2025 30 10 - 44 U/L Final     Anion Gap   Date Value Ref Range Status   01/15/2025 8 8 - 16 mmol/L Final         No results found for this or any previous visit (from the past 2160 hours).      No  results found for this or any previous visit (from the past 2160 hours).                 Plan is to discuss and decide on timing of chemo further, Rad Rx and surgery.    Addendum:  Coordinated care with Dr. Rudd, colorectal surgery and Dr. Arellano and myself.    Completed pelvic irradiation and 5FU infusion.    Folfox Avastin.    MRI and PET as per reportsand HPI above.    Followed by surgical re evaluation. In progress with Dr. Zamora and [facility] Tumor Board.    Being considered for intrahepatic TACE Rx, liver transplant?  Dr. Grubbs.    S/P transanal resection of tumor mass.    ctDNA 11/2025 negative, check in 3 months.  MRI and PET 3/10/25.  RTC 3/17/25.    Bran Calabrese MD  [facility] 1/27/25

## 2025-01-27 NOTE — LETTER
February 1, 2025        Oliver Hatch MD  [address] [place] [address]             [facility] - Hematology Oncology  [address]  [place] [address]  Phone: [phone]  Fax: [phone]   Patient: Yong Arenas   MR Number: [id number]   YOB: 1980   Date of Visit: 1/27/2025       Dear Dr. Hatch:    Thank you for referring Yong Arenas to me for evaluation. Below are the relevant portions of my assessment and plan of care.            If you have questions, please do not hesitate to call me. I look forward to following Yong along with you.    Sincerely,      ELMO Samayoa MD           CC  No Recipients

## 2025-02-03 ENCOUNTER — TELEPHONE (OUTPATIENT)
Facility: CLINIC | Age: 45
End: 2025-02-03
Payer: COMMERCIAL

## 2025-02-03 DIAGNOSIS — C20 RECTAL ADENOCARCINOMA METASTATIC TO LIVER: Primary | ICD-10-CM

## 2025-02-03 DIAGNOSIS — C78.7 RECTAL CANCER METASTASIZED TO LIVER: ICD-10-CM

## 2025-02-03 DIAGNOSIS — C78.7 METASTASIS TO LIVER: ICD-10-CM

## 2025-02-03 DIAGNOSIS — C78.7 RECTAL ADENOCARCINOMA METASTATIC TO LIVER: Primary | ICD-10-CM

## 2025-02-03 DIAGNOSIS — C20 RECTAL CANCER METASTASIZED TO LIVER: ICD-10-CM

## 2025-02-03 NOTE — TELEPHONE ENCOUNTER
----- Message from Paige sent at 2/3/2025 11:17 AM CST -----  Regarding: MRI Abdomen-Pelvis w w/o Contrast (XPD)  Good Morning ,      We have received an order for Mr Arenas for a MRI Abdomen-Pelvis w w/o Contrast (XPD) .  In order to schedule this exam for MRI Abdomen Pelvis - It needs to be 2 separate orders and needs to specify what they are looking for in each exam.        Thank you ,  [facility] radiology scheduling

## 2025-02-04 NOTE — TELEPHONE ENCOUNTER
2/3/24 - Spoke with Юлия in scheduling.  She said that [facility] could perform ABD/Pelvis due to machine.  The appointment was made for Sunday 28/24.  Phoned Patient and daughter stated that the CT scans were to be scheduled in March.  I called to re-schedule and was told [facility] could not perform CT Scan ABD/Pelvis.      Attempted to re-order and warning         Dr. Calabrese will need to assess and order.

## 2025-02-04 NOTE — TELEPHONE ENCOUNTER
----- Message from Paige sent at 2/4/2025  8:10 AM CST -----  Good Morning ,      We have received an order for Mr Arenas for a MRI Abdomen-Pelvis w w/o Contrast (XPD) .   In order to schedule this exam for MRI Abdomen Pelvis - It needs to be 2 separate orders and needs to specify what they are looking for in each exam.     Thank You ,[facility] Scheduling

## 2025-02-06 ENCOUNTER — TELEPHONE (OUTPATIENT)
Facility: CLINIC | Age: 45
End: 2025-02-06
Payer: COMMERCIAL

## 2025-02-06 NOTE — TELEPHONE ENCOUNTER
----- Message from Paige sent at 2/3/2025 11:17 AM CST -----  Regarding: MRI Abdomen-Pelvis w w/o Contrast (XPD)  Good Morning ,      We have received an order for Mr Arenas for a MRI Abdomen-Pelvis w w/o Contrast (XPD) .  In order to schedule this exam for MRI Abdomen Pelvis - It needs to be 2 separate orders and needs to specify what they are looking for in each exam.        Thank you ,  [facility] radiology scheduling

## 2025-02-10 ENCOUNTER — TELEPHONE (OUTPATIENT)
Facility: CLINIC | Age: 45
End: 2025-02-10
Payer: COMMERCIAL

## 2025-02-10 DIAGNOSIS — C20 RECTAL ADENOCARCINOMA METASTATIC TO LIVER: Primary | ICD-10-CM

## 2025-02-10 DIAGNOSIS — C78.7 METASTASES TO THE LIVER: ICD-10-CM

## 2025-02-10 DIAGNOSIS — C78.7 RECTAL ADENOCARCINOMA METASTATIC TO LIVER: Primary | ICD-10-CM

## 2025-02-10 NOTE — TELEPHONE ENCOUNTER
----- Message from Paige sent at 2/10/2025  8:59 AM CST -----  Regarding: MRI Abdomen-Pelvis w w/o Contrast (XPD)  Good Morning ,      We have received an order for Mr Arenas for a MRI Abdomen-Pelvis w w/o Contrast (XPD) .   In order to schedule this exam for MRI Abdomen Pelvis - It needs to be 2 separate orders and needs to specify what they are looking for in each exam. Can you please let us know if patient still needs these exams ?     Thank You ,  [facility] Scheduling

## 2025-02-13 ENCOUNTER — TELEPHONE (OUTPATIENT)
Facility: CLINIC | Age: 45
End: 2025-02-13
Payer: COMMERCIAL

## 2025-02-13 ENCOUNTER — TELEPHONE (OUTPATIENT)
Facility: CLINIC | Age: 45
End: 2025-02-13

## 2025-02-13 DIAGNOSIS — C20 RECTAL ADENOCARCINOMA METASTATIC TO LIVER: Primary | ICD-10-CM

## 2025-02-13 DIAGNOSIS — C78.7 RECTAL ADENOCARCINOMA METASTATIC TO LIVER: Primary | ICD-10-CM

## 2025-02-13 DIAGNOSIS — C20 RECTAL ADENOCARCINOMA METASTATIC TO LIVER: ICD-10-CM

## 2025-02-13 DIAGNOSIS — C78.7 RECTAL ADENOCARCINOMA METASTATIC TO LIVER: ICD-10-CM

## 2025-02-13 NOTE — TELEPHONE ENCOUNTER
----- Message from Janice sent at 2/13/2025 10:22 AM CST -----  Pt would like a call back, the hospital is calling her again about scans that are needed       [phone]

## 2025-02-13 NOTE — TELEPHONE ENCOUNTER
----- Message from Janice sent at 2/13/2025 10:22 AM CST -----  Pt would like a call back, the hospital is calling her again about scans that are needed       [phone]

## 2025-02-26 ENCOUNTER — LAB VISIT (OUTPATIENT)
Dept: LAB | Facility: HOSPITAL | Age: 45
End: 2025-02-26
Attending: INTERNAL MEDICINE
Payer: COMMERCIAL

## 2025-02-26 ENCOUNTER — INFUSION (OUTPATIENT)
Dept: INFUSION THERAPY | Facility: HOSPITAL | Age: 45
End: 2025-02-26
Attending: INTERNAL MEDICINE
Payer: COMMERCIAL

## 2025-02-26 VITALS
WEIGHT: 217.88 LBS | HEART RATE: 98 BPM | HEIGHT: 75 IN | DIASTOLIC BLOOD PRESSURE: 95 MMHG | RESPIRATION RATE: 18 BRPM | OXYGEN SATURATION: 98 % | SYSTOLIC BLOOD PRESSURE: 137 MMHG | BODY MASS INDEX: 27.09 KG/M2 | TEMPERATURE: 98 F

## 2025-02-26 DIAGNOSIS — C78.7 METASTASIS TO LIVER: Primary | ICD-10-CM

## 2025-02-26 DIAGNOSIS — D50.0 IRON DEFICIENCY ANEMIA DUE TO CHRONIC BLOOD LOSS: ICD-10-CM

## 2025-02-26 DIAGNOSIS — C20 RECTAL ADENOCARCINOMA METASTATIC TO LIVER: ICD-10-CM

## 2025-02-26 DIAGNOSIS — C78.7 METASTASIS TO LIVER: ICD-10-CM

## 2025-02-26 DIAGNOSIS — C78.7 RECTAL ADENOCARCINOMA METASTATIC TO LIVER: ICD-10-CM

## 2025-02-26 LAB
ALBUMIN SERPL BCP-MCNC: 4.2 G/DL (ref 3.5–5.2)
ALP SERPL-CCNC: 56 U/L (ref 55–135)
ALT SERPL W/O P-5'-P-CCNC: 23 U/L (ref 10–44)
ANION GAP SERPL CALC-SCNC: 6 MMOL/L (ref 8–16)
AST SERPL-CCNC: 18 U/L (ref 10–40)
BASOPHILS # BLD AUTO: 0.02 K/UL (ref 0–0.2)
BASOPHILS NFR BLD: 0.6 % (ref 0–1.9)
BILIRUB SERPL-MCNC: 0.4 MG/DL (ref 0.1–1)
BUN SERPL-MCNC: 9 MG/DL (ref 6–20)
CALCIUM SERPL-MCNC: 9.2 MG/DL (ref 8.7–10.5)
CEA SERPL-MCNC: 7.6 NG/ML (ref 0–5)
CHLORIDE SERPL-SCNC: 106 MMOL/L (ref 95–110)
CO2 SERPL-SCNC: 28 MMOL/L (ref 23–29)
CREAT SERPL-MCNC: 0.9 MG/DL (ref 0.5–1.4)
DIFFERENTIAL METHOD BLD: ABNORMAL
EOSINOPHIL # BLD AUTO: 0.3 K/UL (ref 0–0.5)
EOSINOPHIL NFR BLD: 9.9 % (ref 0–8)
ERYTHROCYTE [DISTWIDTH] IN BLOOD BY AUTOMATED COUNT: 13.2 % (ref 11.5–14.5)
EST. GFR  (NO RACE VARIABLE): >60 ML/MIN/1.73 M^2
FERRITIN SERPL-MCNC: 74.7 NG/ML (ref 20–300)
GLUCOSE SERPL-MCNC: 109 MG/DL (ref 70–110)
HCT VFR BLD AUTO: 42.2 % (ref 40–54)
HGB BLD-MCNC: 13.9 G/DL (ref 14–18)
IMM GRANULOCYTES # BLD AUTO: 0.01 K/UL (ref 0–0.04)
IMM GRANULOCYTES NFR BLD AUTO: 0.3 % (ref 0–0.5)
LYMPHOCYTES # BLD AUTO: 0.6 K/UL (ref 1–4.8)
LYMPHOCYTES NFR BLD: 19 % (ref 18–48)
MCH RBC QN AUTO: 28.7 PG (ref 27–31)
MCHC RBC AUTO-ENTMCNC: 32.9 G/DL (ref 32–36)
MCV RBC AUTO: 87 FL (ref 82–98)
MONOCYTES # BLD AUTO: 0.2 K/UL (ref 0.3–1)
MONOCYTES NFR BLD: 6.9 % (ref 4–15)
NEUTROPHILS # BLD AUTO: 2.1 K/UL (ref 1.8–7.7)
NEUTROPHILS NFR BLD: 63.3 % (ref 38–73)
NRBC BLD-RTO: 0 /100 WBC
PLATELET # BLD AUTO: 178 K/UL (ref 150–450)
PMV BLD AUTO: 9.1 FL (ref 9.2–12.9)
POTASSIUM SERPL-SCNC: 3.8 MMOL/L (ref 3.5–5.1)
PROT SERPL-MCNC: 6.7 G/DL (ref 6–8.4)
RBC # BLD AUTO: 4.84 M/UL (ref 4.6–6.2)
SODIUM SERPL-SCNC: 140 MMOL/L (ref 136–145)
WBC # BLD AUTO: 3.32 K/UL (ref 3.9–12.7)

## 2025-02-26 PROCEDURE — 80053 COMPREHEN METABOLIC PANEL: CPT | Performed by: INTERNAL MEDICINE

## 2025-02-26 PROCEDURE — 82378 CARCINOEMBRYONIC ANTIGEN: CPT | Performed by: INTERNAL MEDICINE

## 2025-02-26 PROCEDURE — 85025 COMPLETE CBC W/AUTO DIFF WBC: CPT | Performed by: INTERNAL MEDICINE

## 2025-02-26 PROCEDURE — 36591 DRAW BLOOD OFF VENOUS DEVICE: CPT

## 2025-02-26 PROCEDURE — 82728 ASSAY OF FERRITIN: CPT | Performed by: INTERNAL MEDICINE

## 2025-02-26 PROCEDURE — 63600175 PHARM REV CODE 636 W HCPCS: Performed by: INTERNAL MEDICINE

## 2025-02-26 PROCEDURE — 36415 COLL VENOUS BLD VENIPUNCTURE: CPT | Performed by: INTERNAL MEDICINE

## 2025-02-26 PROCEDURE — 25000003 PHARM REV CODE 250: Performed by: INTERNAL MEDICINE

## 2025-02-26 PROCEDURE — A4216 STERILE WATER/SALINE, 10 ML: HCPCS | Performed by: INTERNAL MEDICINE

## 2025-02-26 RX ORDER — HEPARIN 100 UNIT/ML
500 SYRINGE INTRAVENOUS
OUTPATIENT
Start: 2025-02-26

## 2025-02-26 RX ORDER — SODIUM CHLORIDE 0.9 % (FLUSH) 0.9 %
10 SYRINGE (ML) INJECTION
Status: DISCONTINUED | OUTPATIENT
Start: 2025-02-26 | End: 2025-02-26 | Stop reason: HOSPADM

## 2025-02-26 RX ORDER — HEPARIN 100 UNIT/ML
500 SYRINGE INTRAVENOUS
Status: DISCONTINUED | OUTPATIENT
Start: 2025-02-26 | End: 2025-02-26 | Stop reason: HOSPADM

## 2025-02-26 RX ORDER — SODIUM CHLORIDE 0.9 % (FLUSH) 0.9 %
10 SYRINGE (ML) INJECTION
OUTPATIENT
Start: 2025-02-26

## 2025-02-26 RX ADMIN — HEPARIN 500 UNITS: 100 SYRINGE at 03:02

## 2025-02-26 RX ADMIN — Medication 10 ML: at 03:02

## 2025-03-07 ENCOUNTER — TELEPHONE (OUTPATIENT)
Facility: CLINIC | Age: 45
End: 2025-03-07
Payer: COMMERCIAL

## 2025-03-07 NOTE — TELEPHONE ENCOUNTER
I left voice message for pt regarding confirming appt and completing labs prior to appt w/ Dr. Calabrese on 3/17/2025. Left number for pt to contact the office, if they have any questions, would like to reschedule, or confirm appt.

## 2025-03-12 ENCOUNTER — HOSPITAL ENCOUNTER (OUTPATIENT)
Dept: RADIOLOGY | Facility: HOSPITAL | Age: 45
Discharge: HOME | End: 2025-03-12
Attending: INTERNAL MEDICINE
Payer: COMMERCIAL

## 2025-03-12 DIAGNOSIS — C78.7 RECTAL ADENOCARCINOMA METASTATIC TO LIVER: ICD-10-CM

## 2025-03-12 DIAGNOSIS — C20 RECTAL ADENOCARCINOMA METASTATIC TO LIVER: ICD-10-CM

## 2025-03-12 PROCEDURE — 25500020 PHARM REV CODE 255

## 2025-03-12 PROCEDURE — 72197 MRI PELVIS W/O & W/DYE: CPT | Mod: 26,,, | Performed by: RADIOLOGY

## 2025-03-12 PROCEDURE — A9585 GADOBUTROL INJECTION: HCPCS

## 2025-03-12 PROCEDURE — 74183 MRI ABD W/O CNTR FLWD CNTR: CPT | Mod: TC

## 2025-03-12 PROCEDURE — 72197 MRI PELVIS W/O & W/DYE: CPT | Mod: TC

## 2025-03-12 PROCEDURE — 74183 MRI ABD W/O CNTR FLWD CNTR: CPT | Mod: 26,,, | Performed by: RADIOLOGY

## 2025-03-12 RX ORDER — GADOBUTROL 604.72 MG/ML
INJECTION INTRAVENOUS
Status: COMPLETED
Start: 2025-03-12 | End: 2025-03-12

## 2025-03-12 RX ADMIN — GADOBUTROL 9 ML: 604.72 INJECTION INTRAVENOUS at 11:03

## 2025-03-14 ENCOUNTER — TELEPHONE (OUTPATIENT)
Facility: CLINIC | Age: 45
End: 2025-03-14
Payer: COMMERCIAL

## 2025-03-17 ENCOUNTER — OFFICE VISIT (OUTPATIENT)
Facility: CLINIC | Age: 45
End: 2025-03-17
Payer: COMMERCIAL

## 2025-03-17 VITALS
RESPIRATION RATE: 16 BRPM | DIASTOLIC BLOOD PRESSURE: 84 MMHG | TEMPERATURE: 98 F | BODY MASS INDEX: 27.73 KG/M2 | SYSTOLIC BLOOD PRESSURE: 145 MMHG | WEIGHT: 223 LBS | OXYGEN SATURATION: 98 % | HEART RATE: 90 BPM | HEIGHT: 75 IN

## 2025-03-17 DIAGNOSIS — C78.7 METASTASIS TO LIVER: ICD-10-CM

## 2025-03-17 DIAGNOSIS — C20 RECTAL ADENOCARCINOMA METASTATIC TO LIVER: Primary | ICD-10-CM

## 2025-03-17 DIAGNOSIS — C78.7 RECTAL ADENOCARCINOMA METASTATIC TO LIVER: Primary | ICD-10-CM

## 2025-03-17 PROCEDURE — 1159F MED LIST DOCD IN RCRD: CPT | Mod: CPTII,S$GLB,, | Performed by: INTERNAL MEDICINE

## 2025-03-17 PROCEDURE — 3008F BODY MASS INDEX DOCD: CPT | Mod: CPTII,S$GLB,, | Performed by: INTERNAL MEDICINE

## 2025-03-17 PROCEDURE — 3079F DIAST BP 80-89 MM HG: CPT | Mod: CPTII,S$GLB,, | Performed by: INTERNAL MEDICINE

## 2025-03-17 PROCEDURE — G2211 COMPLEX E/M VISIT ADD ON: HCPCS | Mod: S$GLB,,, | Performed by: INTERNAL MEDICINE

## 2025-03-17 PROCEDURE — 99215 OFFICE O/P EST HI 40 MIN: CPT | Mod: S$GLB,,, | Performed by: INTERNAL MEDICINE

## 2025-03-17 PROCEDURE — 3077F SYST BP >= 140 MM HG: CPT | Mod: CPTII,S$GLB,, | Performed by: INTERNAL MEDICINE

## 2025-03-17 PROCEDURE — 99999 PR PBB SHADOW E&M-EST. PATIENT-LVL IV: CPT | Mod: PBBFAC,,, | Performed by: INTERNAL MEDICINE

## 2025-03-17 PROCEDURE — 4010F ACE/ARB THERAPY RXD/TAKEN: CPT | Mod: CPTII,S$GLB,, | Performed by: INTERNAL MEDICINE

## 2025-03-17 NOTE — PROGRESS NOTES
[facility] [address] Hematology Oncology In Office Subsequent Encounter Note    3/17/25    Subjective:       Patient ID: Yong Arenas is a 45 y.o. male returning today for a regularly scheduled follow-up visit.    Chief Complaint: Rectal Cancer with liver mets.       HPI  Rectal cancer, liver metastases, LN.  Treated with chemo.  Folfox Avastin x's 5.  PET MRI slight decrease in rectal mass, larger liver masses resolved, smaller nodules no change.      He has completed the radiation therapy to the pelvis and the concurrent weekly 5 FU infusional chemotherapy.  Dr. Arellano has been his treating radiation physician.    He has completed 5 cycles of chemotherapy with Avastin.  He will resume his chemotherapy and Avastin on September 9, September 23rd, and October 7th to complete 8 cycles of chemotherapy.  Avastin will be held from course number 8 on October 7th in anticipation of his upcoming surgery.    Plan to get MRI of abdomen and pelvis and PET scan around October 14th/15th.  Anticipate that his surgery would be accomplished after October 23rd.  Dr. Rudd had seen him for evaluation of liver metastasectomy and Dr. Sanchez has seen him for rectal surgery.  It is unclear if he will need a low anterior resection or APR.    He is 224 lb and 6 ft 3 in tall.  He is due to have 2 teeth pulled electively and a placement of 2 posts is anticipated with some type of implant or bridge.  This was due to be placed around October 31,  But may be deferred 2 or 3 weeks until after his surgery.    S/P 6 of 8 cycles,  He has some PN sx in his fingertips and toes, should resolve after chemo completed.    Chemo given with out avastin.  PET and MRI no hypermetabolism in liver masses, residual nodules seen in liver, smaller, rectal mass 50% reduction.  He met with Dr. Zamora, to be presented to [facility].  MD advised APR, ostomy.  Pt's insurance lapses 11/17/24.  Pt wants to go for 2nd opinion.  Discussed with Dr. Goldsmith,  will work on this.  Check U/S of liver.    [place] to have hemangioma in liver.  Pt refused APR and LAR surgery because he would not agree to ostomy if needed.  He had transanal  resection of the cancer mass on Friday 11/15/24.  Path is pending.  I discussed with Dr. Paola Betts.  [facility].    Pending ctDNA and path report.  S/P 8 cycles of chemo. And chemo/Rad Rx.  He has developed an anal fissure and is due for a flexible sigmoidoscopy per Dr. Vides 2025.    His CEA has gone from 4.6-7.6 and will be repeated to check for any trend.  MRI scan showed postoperative change without mass being seen.  This was done 2025.  PET scan was not approved per his insurance carrier.  He is due to see Dr. Ellis on 2025.    CT DNA was negative and bit will be due to be repeated in 4 weeks.  He will follow up with myself in 5 weeks.  He would like to have the port removed in 2025 if feasible.        Past Medical History:   Diagnosis Date    Back pain     Cervical pain     GERD (gastroesophageal reflux disease)     HLD (hyperlipidemia)     Hypertension     Rectal cancer 2024    Tinnitus, unspecified ear        Past Surgical History:   Procedure Laterality Date    EGD, WITH BALLOON DILATION OF LESS THAN 30 MILLIMETERS      INSERTION OF TUNNELED CENTRAL VENOUS CATHETER (CVC) WITH SUBCUTANEOUS PORT Left 2024    Procedure: INSERTION, PORT-A-CATH;  Surgeon: Tash Castellanos MD;  Location: [facility];  Service: General;  Laterality: Left;  port placed to right chest , tunneled over to left chest, subclavian    WISDOM TOOTH EXTRACTION      x2       Social History     Socioeconomic History    Marital status:    Tobacco Use    Smoking status: Former     Types: Cigarettes     Start date:      Quit date:      Years since quittin.2    Smokeless tobacco: Never   Substance and Sexual Activity    Alcohol use: Not Currently     Comment: social    Drug use:  Never     Social Drivers of Health     Physical Activity: Unknown (10/30/2024)    Received [facility]    Exercise Vital Sign     Days of Exercise per Week: 5 days       Family History   Problem Relation Name Age of Onset    Cancer Mother          liposarcoma on leg    Prostate cancer Father      Colon polyps Father      Leukemia Niece         Review of patient's allergies indicates:  No Known Allergies    Current Outpatient Medications:     amLODIPine (NORVASC) 5 MG tablet, Take 5 mg by mouth once daily., Disp: , Rfl:     celecoxib (CELEBREX) 200 MG capsule, SMARTSI Capsule(s) By Mouth Every 12 Hours PRN, Disp: , Rfl:     hydrALAZINE (APRESOLINE) 25 MG tablet, Take 1 tablet (25 mg total) by mouth 3 (three) times daily as needed (Severely elevated blood pressure above 190/100)., Disp: 30 tablet, Rfl: 0    ibuprofen (ADVIL,MOTRIN) 200 MG tablet, Take 200 mg by mouth every 6 (six) hours as needed for Pain., Disp: , Rfl:     loratadine-pseudoephedrine 5-120 mg (CLARITIN-D 12 HOUR) 5-120 mg per tablet, Take 1 tablet by mouth daily as needed., Disp: , Rfl:     losartan (COZAAR) 100 MG tablet, Take 1 tablet by mouth once daily., Disp: , Rfl:     omeprazole (PRILOSEC) 40 MG capsule, Take 1 capsule by mouth every morning., Disp: , Rfl:     ondansetron (ZOFRAN) 8 MG tablet, Take 1 tablet (8 mg total) by mouth every 8 (eight) hours as needed for Nausea., Disp: 30 tablet, Rfl: 2    promethazine (PHENERGAN) 25 MG tablet, Take 1 tablet (25 mg total) by mouth every 4 to 6 hours as needed for Nausea., Disp: 30 tablet, Rfl: 2    testosterone cypionate (DEPOTESTOTERONE CYPIONATE) 200 mg/mL injection, Inject 1 mL into the muscle every 14 (fourteen) days., Disp: , Rfl:     All medications and past history have been reviewed.    Review of Systems   Constitutional:  Positive for fatigue. Negative for activity change, appetite change and fever.   HENT:  Negative for congestion, mouth sores, postnasal drip, rhinorrhea, sinus  "pressure, sore throat and trouble swallowing.    Eyes:  Negative for photophobia and visual disturbance.   Respiratory:  Negative for cough, chest tightness, shortness of breath and wheezing.    Cardiovascular:  Negative for chest pain and leg swelling.   Gastrointestinal:  Negative for abdominal distention, abdominal pain, constipation, diarrhea, nausea and vomiting.   Endocrine: Negative for cold intolerance.   Genitourinary:  Negative for decreased urine volume, dysuria and frequency.   Musculoskeletal:  Negative for arthralgias and myalgias.   Skin:  Negative for pallor and rash.   Allergic/Immunologic: Negative for immunocompromised state.   Neurological:  Negative for dizziness, syncope, weakness, light-headedness, numbness and headaches.   Hematological:  Negative for adenopathy. Does not bruise/bleed easily.   Psychiatric/Behavioral:  Negative for sleep disturbance. The patient is not nervous/anxious.        Objective:        BP (!) 145/84 (BP Location: Right arm, Patient Position: Sitting)   Pulse 90   Temp 98.1 °F (36.7 °C) (Temporal)   Resp 16   Ht 6' 3" (1.905 m)   Wt 101.2 kg (223 lb)   SpO2 98%   BMI 27.87 kg/m²     Physical Exam  Constitutional:       Appearance: Normal appearance.   HENT:      Head: Normocephalic and atraumatic.      Mouth/Throat:      Mouth: Mucous membranes are moist.   Cardiovascular:      Rate and Rhythm: Normal rate and regular rhythm.      Pulses: Normal pulses.      Heart sounds: Normal heart sounds.   Pulmonary:      Effort: Pulmonary effort is normal. No respiratory distress.      Breath sounds: Normal breath sounds. No wheezing.   Abdominal:      General: There is no distension.      Palpations: Abdomen is soft. There is no mass.      Tenderness: There is no abdominal tenderness.   Musculoskeletal:         General: No swelling. Normal range of motion.      Right lower leg: No edema.      Left lower leg: No edema.   Skin:     General: Skin is warm and dry.      " Capillary Refill: Capillary refill takes 2 to 3 seconds.      Findings: No bruising or rash.   Neurological:      Mental Status: He is alert and oriented to person, place, and time. Mental status is at baseline.      Motor: No weakness.   Psychiatric:         Mood and Affect: Mood normal.         Behavior: Behavior normal.           No results found for this or any previous visit (from the past 2 weeks).    CMP  Sodium   Date Value Ref Range Status   02/26/2025 140 136 - 145 mmol/L Final     Potassium   Date Value Ref Range Status   02/26/2025 3.8 3.5 - 5.1 mmol/L Final     Chloride   Date Value Ref Range Status   02/26/2025 106 95 - 110 mmol/L Final     CO2   Date Value Ref Range Status   02/26/2025 28 23 - 29 mmol/L Final     Glucose   Date Value Ref Range Status   02/26/2025 109 70 - 110 mg/dL Final     BUN   Date Value Ref Range Status   02/26/2025 9 6 - 20 mg/dL Final     Creatinine   Date Value Ref Range Status   02/26/2025 0.9 0.5 - 1.4 mg/dL Final     Calcium   Date Value Ref Range Status   02/26/2025 9.2 8.7 - 10.5 mg/dL Final     Total Protein   Date Value Ref Range Status   02/26/2025 6.7 6.0 - 8.4 g/dL Final     Albumin   Date Value Ref Range Status   02/26/2025 4.2 3.5 - 5.2 g/dL Final     Total Bilirubin   Date Value Ref Range Status   02/26/2025 0.4 0.1 - 1.0 mg/dL Final     Comment:     For infants and newborns, interpretation of results should be based  on gestational age, weight and in agreement with clinical  observations.    Premature Infant recommended reference ranges:  Up to 24 hours.............<8.0 mg/dL  Up to 48 hours............<12.0 mg/dL  3-5 days..................<15.0 mg/dL  6-29 days.................<15.0 mg/dL       Alkaline Phosphatase   Date Value Ref Range Status   02/26/2025 56 55 - 135 U/L Final     AST   Date Value Ref Range Status   02/26/2025 18 10 - 40 U/L Final     ALT   Date Value Ref Range Status   02/26/2025 23 10 - 44 U/L Final     Anion Gap   Date Value Ref Range  Status   02/26/2025 6 (L) 8 - 16 mmol/L Final                Plan is to discuss and decide on timing of chemo further, Rad Rx and surgery.    Addendum:  Coordinated care with Dr. Rudd, colorectal surgery and Dr. Arellano and myself.    Completed pelvic irradiation and 5FU infusion.    Folfox Avastin.    MRI and PET as per reportsand HPI above.    Followed by surgical re evaluation. In progress with Dr. Zamora and [facility].    Being considered for intrahepatic TACE Rx, liver transplant?  Dr. Grubbs.    S/P transanal resection of tumor mass.      Observation for now.  CT DNA was negative and will be repeated in 4 weeks.  CEA will be done in 4 weeks.  And he will follow up with myself in 5 weeks.  MRI scan showed postop change.  Insurance carrier would not authorize PET scan.  Will follow up on him in 5 weeks..    Bran Calabrese MD  [facility]  3/17/25

## 2025-04-08 ENCOUNTER — TELEPHONE (OUTPATIENT)
Facility: CLINIC | Age: 45
End: 2025-04-08
Payer: COMMERCIAL

## 2025-04-09 ENCOUNTER — INFUSION (OUTPATIENT)
Dept: INFUSION THERAPY | Facility: HOSPITAL | Age: 45
End: 2025-04-09
Attending: INTERNAL MEDICINE
Payer: COMMERCIAL

## 2025-04-09 ENCOUNTER — TELEPHONE (OUTPATIENT)
Facility: CLINIC | Age: 45
End: 2025-04-09
Payer: COMMERCIAL

## 2025-04-09 VITALS
WEIGHT: 227.63 LBS | BODY MASS INDEX: 28.3 KG/M2 | SYSTOLIC BLOOD PRESSURE: 147 MMHG | DIASTOLIC BLOOD PRESSURE: 90 MMHG | HEIGHT: 75 IN | TEMPERATURE: 98 F | OXYGEN SATURATION: 100 % | RESPIRATION RATE: 18 BRPM | HEART RATE: 91 BPM

## 2025-04-09 DIAGNOSIS — C20 RECTAL ADENOCARCINOMA METASTATIC TO LIVER: Primary | ICD-10-CM

## 2025-04-09 DIAGNOSIS — C20 ADENOCARCINOMA OF RECTUM: ICD-10-CM

## 2025-04-09 DIAGNOSIS — C78.7 RECTAL CANCER METASTASIZED TO LIVER: ICD-10-CM

## 2025-04-09 DIAGNOSIS — C78.7 RECTAL ADENOCARCINOMA METASTATIC TO LIVER: Primary | ICD-10-CM

## 2025-04-09 DIAGNOSIS — C78.7 RECTAL ADENOCARCINOMA METASTATIC TO LIVER: ICD-10-CM

## 2025-04-09 DIAGNOSIS — C20 RECTAL ADENOCARCINOMA METASTATIC TO LIVER: ICD-10-CM

## 2025-04-09 DIAGNOSIS — C20 RECTAL CANCER METASTASIZED TO LIVER: ICD-10-CM

## 2025-04-09 DIAGNOSIS — C78.7 METASTASIS TO LIVER: Primary | ICD-10-CM

## 2025-04-09 LAB
ABSOLUTE EOSINOPHIL (SMH): 0.34 K/UL
ABSOLUTE MONOCYTE (SMH): 0.21 K/UL (ref 0.3–1)
ABSOLUTE NEUTROPHIL COUNT (SMH): 2.1 K/UL (ref 1.8–7.7)
ALBUMIN SERPL-MCNC: 4.1 G/DL (ref 3.5–5.2)
ALP SERPL-CCNC: 69 UNIT/L (ref 55–135)
ALT SERPL-CCNC: 85 UNIT/L (ref 10–44)
ANION GAP (SMH): 9 MMOL/L (ref 8–16)
AST SERPL-CCNC: 90 UNIT/L (ref 10–40)
BASOPHILS # BLD AUTO: 0.04 K/UL
BASOPHILS NFR BLD AUTO: 1.2 %
BILIRUB SERPL-MCNC: 0.5 MG/DL (ref 0.1–1)
BUN SERPL-MCNC: 16 MG/DL (ref 6–20)
CALCIUM SERPL-MCNC: 8.9 MG/DL (ref 8.7–10.5)
CARCINOEMBRYONIC ANTIGEN (SMH): 17 NG/ML
CHLORIDE SERPL-SCNC: 104 MMOL/L (ref 95–110)
CO2 SERPL-SCNC: 25 MMOL/L (ref 23–29)
CREAT SERPL-MCNC: 0.8 MG/DL (ref 0.5–1.4)
ERYTHROCYTE [DISTWIDTH] IN BLOOD BY AUTOMATED COUNT: 14.1 % (ref 11.5–14.5)
GFR SERPLBLD CREATININE-BSD FMLA CKD-EPI: >60 ML/MIN/1.73/M2
GLUCOSE SERPL-MCNC: 90 MG/DL (ref 70–110)
HCT VFR BLD AUTO: 41.3 % (ref 40–54)
HGB BLD-MCNC: 13.9 GM/DL (ref 14–18)
IMM GRANULOCYTES # BLD AUTO: 0.02 K/UL (ref 0–0.04)
IMM GRANULOCYTES NFR BLD AUTO: 0.6 % (ref 0–0.5)
LYMPHOCYTES # BLD AUTO: 0.71 K/UL (ref 1–4.8)
MCH RBC QN AUTO: 29.5 PG (ref 27–31)
MCHC RBC AUTO-ENTMCNC: 33.7 G/DL (ref 32–36)
MCV RBC AUTO: 88 FL (ref 82–98)
NUCLEATED RBC (/100WBC) (SMH): 0 /100 WBC
PLATELET # BLD AUTO: 178 K/UL (ref 150–450)
PMV BLD AUTO: 9.5 FL (ref 9.2–12.9)
POTASSIUM SERPL-SCNC: 3.7 MMOL/L (ref 3.5–5.1)
PROT SERPL-MCNC: 6.9 GM/DL (ref 6–8.4)
RBC # BLD AUTO: 4.71 M/UL (ref 4.6–6.2)
RELATIVE EOSINOPHIL (SMH): 9.9 % (ref 0–8)
RELATIVE LYMPHOCYTE (SMH): 20.8 % (ref 18–48)
RELATIVE MONOCYTE (SMH): 6.1 % (ref 4–15)
RELATIVE NEUTROPHIL (SMH): 61.4 % (ref 38–73)
SODIUM SERPL-SCNC: 138 MMOL/L (ref 136–145)
WBC # BLD AUTO: 3.42 K/UL (ref 3.9–12.7)

## 2025-04-09 PROCEDURE — 63600175 PHARM REV CODE 636 W HCPCS: Performed by: INTERNAL MEDICINE

## 2025-04-09 PROCEDURE — A4216 STERILE WATER/SALINE, 10 ML: HCPCS | Performed by: INTERNAL MEDICINE

## 2025-04-09 PROCEDURE — 36415 COLL VENOUS BLD VENIPUNCTURE: CPT

## 2025-04-09 PROCEDURE — 36591 DRAW BLOOD OFF VENOUS DEVICE: CPT

## 2025-04-09 PROCEDURE — 25000003 PHARM REV CODE 250: Performed by: INTERNAL MEDICINE

## 2025-04-09 RX ORDER — SODIUM CHLORIDE 0.9 % (FLUSH) 0.9 %
10 SYRINGE (ML) INJECTION
OUTPATIENT
Start: 2025-04-09

## 2025-04-09 RX ORDER — SODIUM CHLORIDE 0.9 % (FLUSH) 0.9 %
10 SYRINGE (ML) INJECTION
Status: DISCONTINUED | OUTPATIENT
Start: 2025-04-09 | End: 2025-04-09 | Stop reason: HOSPADM

## 2025-04-09 RX ORDER — HEPARIN 100 UNIT/ML
500 SYRINGE INTRAVENOUS
Status: DISCONTINUED | OUTPATIENT
Start: 2025-04-09 | End: 2025-04-09 | Stop reason: HOSPADM

## 2025-04-09 RX ORDER — HEPARIN 100 UNIT/ML
500 SYRINGE INTRAVENOUS
OUTPATIENT
Start: 2025-04-09

## 2025-04-09 RX ADMIN — HEPARIN 500 UNITS: 100 SYRINGE at 11:04

## 2025-04-09 RX ADMIN — SODIUM CHLORIDE, PRESERVATIVE FREE 10 ML: 5 INJECTION INTRAVENOUS at 11:04

## 2025-04-09 NOTE — PLAN OF CARE
Problem: Infection  Goal: Absence of Infection Signs and Symptoms  Outcome: Progressing  Intervention: Prevent or Manage Infection  Flowsheets (Taken 4/9/2025 1123)  Infection Management: aseptic technique maintained  Isolation Precautions: precautions maintained

## 2025-04-25 ENCOUNTER — TELEPHONE (OUTPATIENT)
Facility: CLINIC | Age: 45
End: 2025-04-25
Payer: COMMERCIAL

## 2025-04-25 NOTE — TELEPHONE ENCOUNTER
Unable to leave voice message for pt regarding confirming appt w/ Suzy Lofton, on 05/21/2025 due to pt's line being busy.

## 2025-05-16 ENCOUNTER — TELEPHONE (OUTPATIENT)
Facility: CLINIC | Age: 45
End: 2025-05-16
Payer: COMMERCIAL

## 2025-05-16 DIAGNOSIS — C78.7 RECTAL ADENOCARCINOMA METASTATIC TO LIVER: Primary | ICD-10-CM

## 2025-05-16 DIAGNOSIS — C80.1: ICD-10-CM

## 2025-05-16 DIAGNOSIS — C78.7 METASTASES TO THE LIVER: ICD-10-CM

## 2025-05-16 DIAGNOSIS — C20 RECTAL ADENOCARCINOMA METASTATIC TO LIVER: Primary | ICD-10-CM

## 2025-05-20 RX ORDER — SODIUM CHLORIDE 0.9 % (FLUSH) 0.9 %
10 SYRINGE (ML) INJECTION
Status: CANCELLED | OUTPATIENT
Start: 2025-05-20

## 2025-05-20 RX ORDER — HEPARIN 100 UNIT/ML
500 SYRINGE INTRAVENOUS
Status: CANCELLED | OUTPATIENT
Start: 2025-05-20

## 2025-05-21 ENCOUNTER — HOSPITAL ENCOUNTER (OUTPATIENT)
Dept: RADIOLOGY | Facility: HOSPITAL | Age: 45
Discharge: HOME | End: 2025-05-21
Attending: NURSE PRACTITIONER
Payer: COMMERCIAL

## 2025-05-21 ENCOUNTER — INFUSION (OUTPATIENT)
Dept: INFUSION THERAPY | Facility: HOSPITAL | Age: 45
End: 2025-05-21
Attending: INTERNAL MEDICINE
Payer: COMMERCIAL

## 2025-05-21 ENCOUNTER — TELEPHONE (OUTPATIENT)
Facility: CLINIC | Age: 45
End: 2025-05-21

## 2025-05-21 ENCOUNTER — OFFICE VISIT (OUTPATIENT)
Facility: CLINIC | Age: 45
End: 2025-05-21
Payer: COMMERCIAL

## 2025-05-21 VITALS
SYSTOLIC BLOOD PRESSURE: 150 MMHG | WEIGHT: 231.88 LBS | HEART RATE: 87 BPM | HEIGHT: 75 IN | BODY MASS INDEX: 28.83 KG/M2 | OXYGEN SATURATION: 98 % | DIASTOLIC BLOOD PRESSURE: 98 MMHG | RESPIRATION RATE: 16 BRPM | TEMPERATURE: 98 F

## 2025-05-21 VITALS
HEART RATE: 87 BPM | DIASTOLIC BLOOD PRESSURE: 98 MMHG | SYSTOLIC BLOOD PRESSURE: 150 MMHG | TEMPERATURE: 98 F | BODY MASS INDEX: 28.85 KG/M2 | WEIGHT: 232 LBS | HEIGHT: 75 IN | RESPIRATION RATE: 16 BRPM | OXYGEN SATURATION: 98 %

## 2025-05-21 DIAGNOSIS — C20 RECTAL CANCER METASTASIZED TO LIVER: ICD-10-CM

## 2025-05-21 DIAGNOSIS — C20 RECTAL ADENOCARCINOMA METASTATIC TO LIVER: ICD-10-CM

## 2025-05-21 DIAGNOSIS — T45.1X5A CHEMOTHERAPY INDUCED NEUTROPENIA: ICD-10-CM

## 2025-05-21 DIAGNOSIS — C78.7 RECTAL ADENOCARCINOMA METASTATIC TO LIVER: Primary | ICD-10-CM

## 2025-05-21 DIAGNOSIS — D70.1 CHEMOTHERAPY INDUCED NEUTROPENIA: ICD-10-CM

## 2025-05-21 DIAGNOSIS — C78.7 METASTASIS TO LIVER: Primary | ICD-10-CM

## 2025-05-21 DIAGNOSIS — Z95.828 PORT-A-CATH IN PLACE: Primary | ICD-10-CM

## 2025-05-21 DIAGNOSIS — E86.0 DEHYDRATION: ICD-10-CM

## 2025-05-21 DIAGNOSIS — C20 RECTAL ADENOCARCINOMA METASTATIC TO LIVER: Primary | ICD-10-CM

## 2025-05-21 DIAGNOSIS — Z45.2 ENCOUNTER FOR CARE RELATED TO PORT-A-CATH: ICD-10-CM

## 2025-05-21 DIAGNOSIS — Z95.828 PORT-A-CATH IN PLACE: ICD-10-CM

## 2025-05-21 DIAGNOSIS — C78.7 RECTAL CANCER METASTASIZED TO LIVER: ICD-10-CM

## 2025-05-21 DIAGNOSIS — C78.7 RECTAL ADENOCARCINOMA METASTATIC TO LIVER: ICD-10-CM

## 2025-05-21 DIAGNOSIS — C78.7 METASTASIS TO LIVER: ICD-10-CM

## 2025-05-21 DIAGNOSIS — C20 ADENOCARCINOMA OF RECTUM: ICD-10-CM

## 2025-05-21 LAB
ABSOLUTE EOSINOPHIL (SMH): 0.52 K/UL
ABSOLUTE MONOCYTE (SMH): 0.25 K/UL (ref 0.3–1)
ABSOLUTE NEUTROPHIL COUNT (SMH): 2.2 K/UL (ref 1.8–7.7)
ALBUMIN SERPL-MCNC: 4.3 G/DL (ref 3.5–5.2)
ALP SERPL-CCNC: 78 UNIT/L (ref 55–135)
ALT SERPL-CCNC: 41 UNIT/L (ref 10–44)
ANION GAP (SMH): 9 MMOL/L (ref 8–16)
AST SERPL-CCNC: 29 UNIT/L (ref 10–40)
BASOPHILS # BLD AUTO: 0.03 K/UL
BASOPHILS NFR BLD AUTO: 0.8 %
BILIRUB SERPL-MCNC: 0.4 MG/DL (ref 0.1–1)
BUN SERPL-MCNC: 19 MG/DL (ref 6–20)
CALCIUM SERPL-MCNC: 9.3 MG/DL (ref 8.7–10.5)
CARCINOEMBRYONIC ANTIGEN (SMH): 48.6 NG/ML
CHLORIDE SERPL-SCNC: 105 MMOL/L (ref 95–110)
CO2 SERPL-SCNC: 25 MMOL/L (ref 23–29)
CREAT SERPL-MCNC: 1 MG/DL (ref 0.5–1.4)
ERYTHROCYTE [DISTWIDTH] IN BLOOD BY AUTOMATED COUNT: 13.3 % (ref 11.5–14.5)
GFR SERPLBLD CREATININE-BSD FMLA CKD-EPI: >60 ML/MIN/1.73/M2
GLUCOSE SERPL-MCNC: 118 MG/DL (ref 70–110)
HCT VFR BLD AUTO: 41.4 % (ref 40–54)
HGB BLD-MCNC: 14.4 GM/DL (ref 14–18)
IMM GRANULOCYTES # BLD AUTO: 0.02 K/UL (ref 0–0.04)
IMM GRANULOCYTES NFR BLD AUTO: 0.5 % (ref 0–0.5)
LYMPHOCYTES # BLD AUTO: 0.77 K/UL (ref 1–4.8)
MCH RBC QN AUTO: 29.6 PG (ref 27–31)
MCHC RBC AUTO-ENTMCNC: 34.8 G/DL (ref 32–36)
MCV RBC AUTO: 85 FL (ref 82–98)
NUCLEATED RBC (/100WBC) (SMH): 0 /100 WBC
PLATELET # BLD AUTO: 186 K/UL (ref 150–450)
PMV BLD AUTO: 9.2 FL (ref 9.2–12.9)
POTASSIUM SERPL-SCNC: 3.8 MMOL/L (ref 3.5–5.1)
PROT SERPL-MCNC: 7.1 GM/DL (ref 6–8.4)
RBC # BLD AUTO: 4.87 M/UL (ref 4.6–6.2)
RELATIVE EOSINOPHIL (SMH): 13.8 % (ref 0–8)
RELATIVE LYMPHOCYTE (SMH): 20.4 % (ref 18–48)
RELATIVE MONOCYTE (SMH): 6.6 % (ref 4–15)
RELATIVE NEUTROPHIL (SMH): 57.9 % (ref 38–73)
SODIUM SERPL-SCNC: 139 MMOL/L (ref 136–145)
WBC # BLD AUTO: 3.77 K/UL (ref 3.9–12.7)

## 2025-05-21 PROCEDURE — 3080F DIAST BP >= 90 MM HG: CPT | Mod: CPTII,S$GLB,, | Performed by: NURSE PRACTITIONER

## 2025-05-21 PROCEDURE — 36598 INJ W/FLUOR EVAL CV DEVICE: CPT

## 2025-05-21 PROCEDURE — 63600175 PHARM REV CODE 636 W HCPCS: Performed by: INTERNAL MEDICINE

## 2025-05-21 PROCEDURE — 4010F ACE/ARB THERAPY RXD/TAKEN: CPT | Mod: CPTII,S$GLB,, | Performed by: NURSE PRACTITIONER

## 2025-05-21 PROCEDURE — A4216 STERILE WATER/SALINE, 10 ML: HCPCS | Performed by: INTERNAL MEDICINE

## 2025-05-21 PROCEDURE — 36598 INJ W/FLUOR EVAL CV DEVICE: CPT | Mod: ,,, | Performed by: RADIOLOGY

## 2025-05-21 PROCEDURE — 85025 COMPLETE CBC W/AUTO DIFF WBC: CPT | Performed by: INTERNAL MEDICINE

## 2025-05-21 PROCEDURE — 99215 OFFICE O/P EST HI 40 MIN: CPT | Mod: S$GLB,,, | Performed by: NURSE PRACTITIONER

## 2025-05-21 PROCEDURE — 3008F BODY MASS INDEX DOCD: CPT | Mod: CPTII,S$GLB,, | Performed by: NURSE PRACTITIONER

## 2025-05-21 PROCEDURE — 84132 ASSAY OF SERUM POTASSIUM: CPT | Performed by: INTERNAL MEDICINE

## 2025-05-21 PROCEDURE — 99999 PR PBB SHADOW E&M-EST. PATIENT-LVL IV: CPT | Mod: PBBFAC,,, | Performed by: NURSE PRACTITIONER

## 2025-05-21 PROCEDURE — 25000003 PHARM REV CODE 250: Performed by: INTERNAL MEDICINE

## 2025-05-21 PROCEDURE — 82378 CARCINOEMBRYONIC ANTIGEN: CPT | Performed by: INTERNAL MEDICINE

## 2025-05-21 PROCEDURE — 1159F MED LIST DOCD IN RCRD: CPT | Mod: CPTII,S$GLB,, | Performed by: NURSE PRACTITIONER

## 2025-05-21 PROCEDURE — 3077F SYST BP >= 140 MM HG: CPT | Mod: CPTII,S$GLB,, | Performed by: NURSE PRACTITIONER

## 2025-05-21 PROCEDURE — 63600175 PHARM REV CODE 636 W HCPCS: Performed by: NURSE PRACTITIONER

## 2025-05-21 PROCEDURE — G2211 COMPLEX E/M VISIT ADD ON: HCPCS | Mod: S$GLB,,, | Performed by: NURSE PRACTITIONER

## 2025-05-21 PROCEDURE — 36591 DRAW BLOOD OFF VENOUS DEVICE: CPT

## 2025-05-21 RX ORDER — HEPARIN 100 UNIT/ML
500 SYRINGE INTRAVENOUS
Status: DISCONTINUED | OUTPATIENT
Start: 2025-05-21 | End: 2025-05-21 | Stop reason: HOSPADM

## 2025-05-21 RX ORDER — HEPARIN 100 UNIT/ML
500 SYRINGE INTRAVENOUS
OUTPATIENT
Start: 2025-05-21

## 2025-05-21 RX ORDER — SODIUM CHLORIDE 0.9 % (FLUSH) 0.9 %
10 SYRINGE (ML) INJECTION
Status: DISCONTINUED | OUTPATIENT
Start: 2025-05-21 | End: 2025-05-21 | Stop reason: HOSPADM

## 2025-05-21 RX ORDER — HEPARIN 100 UNIT/ML
5 SYRINGE INTRAVENOUS ONCE
Status: COMPLETED | OUTPATIENT
Start: 2025-05-21 | End: 2025-05-21

## 2025-05-21 RX ORDER — SODIUM CHLORIDE 0.9 % (FLUSH) 0.9 %
10 SYRINGE (ML) INJECTION
OUTPATIENT
Start: 2025-05-21

## 2025-05-21 RX ADMIN — SODIUM CHLORIDE, PRESERVATIVE FREE 10 ML: 5 INJECTION INTRAVENOUS at 03:05

## 2025-05-21 RX ADMIN — HEPARIN 500 UNITS: 100 SYRINGE at 04:05

## 2025-05-21 RX ADMIN — HEPARIN 500 UNITS: 100 SYRINGE at 03:05

## 2025-05-21 NOTE — PLAN OF CARE
Problem: Infection  Goal: Absence of Infection Signs and Symptoms  Outcome: Progressing  Intervention: Prevent or Manage Infection  Flowsheets (Taken 5/21/2025 1510)  Infection Management: aseptic technique maintained  Isolation Precautions:   precautions initiated   precautions maintained

## 2025-05-21 NOTE — PROGRESS NOTES
[facility] [address] Hematology Oncology In Office Subsequent Encounter Note    3/17/25    Subjective:       Patient ID: Yong Arenas is a 45 y.o. male returning today for a regularly scheduled follow-up visit.    Chief Complaint: Rectal Cancer with liver mets.       HPI  The patient is here today with his wife for a regularly scheduled follow up visit.   He is here to review most recent labs and imaging.   He is with his wife.     He did have a recent increase in his CEA , and has a PET scan scheduled for next week.      Cancer History:   Rectal cancer, liver metastases, LN.  Treated with chemo.  Folfox Avastin x's 5.  PET MRI slight decrease in rectal mass, larger liver masses resolved, smaller nodules no change.       He has completed the radiation therapy to the pelvis and the concurrent weekly 5 FU infusional chemotherapy completed August 2024.      He has completed 5 cycles of chemotherapy FOLFOX with Avastin.  He will resume his chemotherapy and Avastin on September 9, September 23rd, and October 7th to complete 8 cycles of chemotherapy.  Avastin will be held from course number 8 on October 7th in anticipation of his upcoming surgery.    He had surgery in November with Dr. Gomez at [facility].   She was been followed with ct DNA and first one was negative, but then had a second one that was elevated.   CEA has also increased.     He does mention his PAC is bothering him as well.       Past Medical History:   Diagnosis Date    Back pain     Cervical pain     GERD (gastroesophageal reflux disease)     HLD (hyperlipidemia)     Hypertension     Rectal cancer 03/2024    Tinnitus, unspecified ear        Past Surgical History:   Procedure Laterality Date    EGD, WITH BALLOON DILATION OF LESS THAN 30 MILLIMETERS  2020    INSERTION OF TUNNELED CENTRAL VENOUS CATHETER (CVC) WITH SUBCUTANEOUS PORT Left 4/9/2024    Procedure: INSERTION, PORT-A-CATH;  Surgeon: Tash Castellanos MD;  Location: [facility] OR;   Service: General;  Laterality: Left;  port placed to right chest , tunneled over to left chest, subclavian    WISDOM TOOTH EXTRACTION      x2       Social History     Socioeconomic History    Marital status:    Tobacco Use    Smoking status: Former     Types: Cigarettes     Start date:      Quit date:      Years since quittin.4    Smokeless tobacco: Never   Substance and Sexual Activity    Alcohol use: Not Currently     Comment: social    Drug use: Never     Social Drivers of Health     Physical Activity: Unknown (10/30/2024)    [facility]    Exercise Vital Sign     Days of Exercise per Week: 5 days       Family History   Problem Relation Name Age of Onset    Cancer Mother          liposarcoma on leg    Prostate cancer Father      Colon polyps Father      Leukemia Niece         Review of patient's allergies indicates:  No Known Allergies    Current Outpatient Medications:     amLODIPine (NORVASC) 5 MG tablet, Take 5 mg by mouth once daily., Disp: , Rfl:     celecoxib (CELEBREX) 200 MG capsule, SMARTSI Capsule(s) By Mouth Every 12 Hours PRN, Disp: , Rfl:     hydrALAZINE (APRESOLINE) 25 MG tablet, Take 1 tablet (25 mg total) by mouth 3 (three) times daily as needed (Severely elevated blood pressure above 190/100)., Disp: 30 tablet, Rfl: 0    ibuprofen (ADVIL,MOTRIN) 200 MG tablet, Take 200 mg by mouth every 6 (six) hours as needed for Pain., Disp: , Rfl:     loratadine-pseudoephedrine 5-120 mg (CLARITIN-D 12 HOUR) 5-120 mg per tablet, Take 1 tablet by mouth daily as needed., Disp: , Rfl:     losartan (COZAAR) 100 MG tablet, Take 1 tablet by mouth once daily., Disp: , Rfl:     omeprazole (PRILOSEC) 40 MG capsule, Take 1 capsule by mouth every morning., Disp: , Rfl:     promethazine (PHENERGAN) 25 MG tablet, Take 1 tablet (25 mg total) by mouth every 4 to 6 hours as needed for Nausea., Disp: 30 tablet, Rfl: 2    testosterone cypionate (DEPOTESTOTERONE CYPIONATE) 200 mg/mL injection,  "Inject 1 mL into the muscle every 14 (fourteen) days., Disp: , Rfl:   No current facility-administered medications for this visit.    All medications and past history have been reviewed.    Review of Systems   Constitutional:  Positive for fatigue. Negative for activity change, appetite change and fever.   HENT:  Negative for congestion, mouth sores, postnasal drip, rhinorrhea, sinus pressure, sore throat and trouble swallowing.    Eyes:  Negative for photophobia and visual disturbance.   Respiratory:  Negative for cough, chest tightness, shortness of breath and wheezing.    Cardiovascular:  Negative for chest pain and leg swelling.   Gastrointestinal:  Negative for abdominal distention, abdominal pain, constipation, diarrhea, nausea and vomiting.   Endocrine: Negative for cold intolerance.   Genitourinary:  Negative for decreased urine volume, dysuria and frequency.   Musculoskeletal:  Negative for arthralgias and myalgias.   Skin:  Negative for pallor and rash.   Allergic/Immunologic: Negative for immunocompromised state.   Neurological:  Negative for dizziness, syncope, weakness, light-headedness, numbness and headaches.   Hematological:  Negative for adenopathy. Does not bruise/bleed easily.   Psychiatric/Behavioral:  Negative for sleep disturbance. The patient is not nervous/anxious.        Objective:        BP (!) 150/98 (BP Location: Left arm, Patient Position: Sitting)   Pulse 87   Temp 97.7 °F (36.5 °C) (Temporal)   Resp 16   Ht 6' 3" (1.905 m)   Wt 105.2 kg (232 lb)   SpO2 98%   BMI 29.00 kg/m²     Physical Exam  Constitutional:       Appearance: Normal appearance.   HENT:      Head: Normocephalic and atraumatic.      Mouth/Throat:      Mouth: Mucous membranes are moist.   Cardiovascular:      Rate and Rhythm: Normal rate and regular rhythm.      Pulses: Normal pulses.      Heart sounds: Normal heart sounds.   Pulmonary:      Effort: Pulmonary effort is normal. No respiratory distress.      Breath " sounds: Normal breath sounds. No wheezing.   Abdominal:      General: There is no distension.      Palpations: Abdomen is soft. There is no mass.      Tenderness: There is no abdominal tenderness.   Musculoskeletal:         General: No swelling. Normal range of motion.      Right lower leg: No edema.      Left lower leg: No edema.   Skin:     General: Skin is warm and dry.      Capillary Refill: Capillary refill takes 2 to 3 seconds.      Findings: No bruising or rash.   Neurological:      Mental Status: He is alert and oriented to person, place, and time. Mental status is at baseline.      Motor: No weakness.   Psychiatric:         Mood and Affect: Mood normal.         Behavior: Behavior normal.         Lab Results   Component Value Date    WBC 3.77 (L) 05/21/2025    HGB 14.4 05/21/2025    HCT 41.4 05/21/2025    MCV 85 05/21/2025     05/21/2025       CMP  Sodium   Date Value Ref Range Status   05/21/2025 139 136 - 145 mmol/L Final     Potassium   Date Value Ref Range Status   05/21/2025 3.8 3.5 - 5.1 mmol/L Final     Chloride   Date Value Ref Range Status   05/21/2025 105 95 - 110 mmol/L Final     CO2   Date Value Ref Range Status   05/21/2025 25 23 - 29 mmol/L Final     Glucose   Date Value Ref Range Status   05/21/2025 118 (H) 70 - 110 mg/dL Final     BUN   Date Value Ref Range Status   05/21/2025 19 6 - 20 mg/dL Final     Creatinine   Date Value Ref Range Status   05/21/2025 1.0 0.5 - 1.4 mg/dL Final     Calcium   Date Value Ref Range Status   05/21/2025 9.3 8.7 - 10.5 mg/dL Final     Protein Total   Date Value Ref Range Status   05/21/2025 7.1 6.0 - 8.4 gm/dL Final     Albumin   Date Value Ref Range Status   05/21/2025 4.3 3.5 - 5.2 g/dL Final     Bilirubin Total   Date Value Ref Range Status   05/21/2025 0.4 0.1 - 1.0 mg/dL Final     Comment:     For infants and newborns, interpretation of results should be based   on gestational age, weight and in agreement with clinical   observations.    Premature  Infant recommended reference ranges:   0-24 hours:  <8.0 mg/dL   24-48 hours: <12.0 mg/dL   3-5 days:    <15.0 mg/dL   6-29 days:   <15.0 mg/dL     ALP   Date Value Ref Range Status   05/21/2025 78 55 - 135 unit/L Final     AST   Date Value Ref Range Status   05/21/2025 29 10 - 40 unit/L Final     ALT   Date Value Ref Range Status   05/21/2025 41 10 - 44 unit/L Final     Anion Gap   Date Value Ref Range Status   05/21/2025 9 8 - 16 mmol/L Final     eGFR   Date Value Ref Range Status   05/21/2025 >60 >60 mL/min/1.73/m2 Final   02/26/2025 >60.0 >60 mL/min/1.73 m^2 Final       CMP  Sodium   Date Value Ref Range Status   05/21/2025 139 136 - 145 mmol/L Final     Potassium   Date Value Ref Range Status   05/21/2025 3.8 3.5 - 5.1 mmol/L Final     Chloride   Date Value Ref Range Status   05/21/2025 105 95 - 110 mmol/L Final     CO2   Date Value Ref Range Status   05/21/2025 25 23 - 29 mmol/L Final     Glucose   Date Value Ref Range Status   05/21/2025 118 (H) 70 - 110 mg/dL Final     BUN   Date Value Ref Range Status   05/21/2025 19 6 - 20 mg/dL Final     Creatinine   Date Value Ref Range Status   05/21/2025 1.0 0.5 - 1.4 mg/dL Final     Calcium   Date Value Ref Range Status   05/21/2025 9.3 8.7 - 10.5 mg/dL Final     Protein Total   Date Value Ref Range Status   05/21/2025 7.1 6.0 - 8.4 gm/dL Final     Albumin   Date Value Ref Range Status   05/21/2025 4.3 3.5 - 5.2 g/dL Final     Bilirubin Total   Date Value Ref Range Status   05/21/2025 0.4 0.1 - 1.0 mg/dL Final     Comment:     For infants and newborns, interpretation of results should be based   on gestational age, weight and in agreement with clinical   observations.    Premature Infant recommended reference ranges:   0-24 hours:  <8.0 mg/dL   24-48 hours: <12.0 mg/dL   3-5 days:    <15.0 mg/dL   6-29 days:   <15.0 mg/dL     ALP   Date Value Ref Range Status   05/21/2025 78 55 - 135 unit/L Final     AST   Date Value Ref Range Status   05/21/2025 29 10 - 40 unit/L  Final     ALT   Date Value Ref Range Status   05/21/2025 41 10 - 44 unit/L Final     Anion Gap   Date Value Ref Range Status   05/21/2025 9 8 - 16 mmol/L Final     Vascular Quality Initiative-Carotid Endarterectomy           1. Rectal adenocarcinoma metastatic to liver  Assessment & Plan:  Patient post liver resection and colon surgery after neoadjuvant chemotherapy and chemo with XRT.    His CEA has increased to 17 and CtDNA came back positive   Needs a PET scan         2. Metastasis to liver    3. Encounter for care related to Port-a-Cath  Assessment & Plan:  Port study done and shows crack in port line   Will need to have port removed, consult placed today            Signed by:   Suzy Mittal, NILA,APRN,AGNP-C

## 2025-05-22 ENCOUNTER — TELEPHONE (OUTPATIENT)
Dept: SURGERY | Facility: CLINIC | Age: 45
End: 2025-05-22
Payer: COMMERCIAL

## 2025-05-22 ENCOUNTER — TELEPHONE (OUTPATIENT)
Facility: CLINIC | Age: 45
End: 2025-05-22
Payer: COMMERCIAL

## 2025-05-22 DIAGNOSIS — Z95.828 PORT-A-CATH IN PLACE: Primary | ICD-10-CM

## 2025-05-22 PROBLEM — Z45.2 ENCOUNTER FOR CARE RELATED TO PORT-A-CATH: Status: ACTIVE | Noted: 2025-05-22

## 2025-05-22 NOTE — ASSESSMENT & PLAN NOTE
Patient post liver resection and colon surgery after neoadjuvant chemotherapy and chemo with XRT.    His CEA has increased to 17 and CtDNA came back positive   Needs a PET scan

## 2025-05-22 NOTE — TELEPHONE ENCOUNTER
----- Message from Med Assistant Jones sent at 5/22/2025  8:50 AM CDT -----  Please call to schedule. Thank you

## 2025-05-22 NOTE — NURSING
ROMA Lofton ordered to flush port due to pt stating it feels different. Accessed port and while flushing pt stated he could feel it in his chest. Blood return noted. Notified NP who stated will order port study.

## 2025-05-22 NOTE — ASSESSMENT & PLAN NOTE
Port study done and shows crack in port line   Will need to have port removed, consult placed today

## 2025-05-22 NOTE — TELEPHONE ENCOUNTER
Per Suzy's verbal orders to do so, I placed referral to surgeon for port removal as port study done yesterday showed port to be bad. Per Suzy patient is already aware of need for port removal.

## 2025-05-23 ENCOUNTER — TELEPHONE (OUTPATIENT)
Dept: SURGERY | Facility: CLINIC | Age: 45
End: 2025-05-23
Payer: COMMERCIAL

## 2025-05-27 ENCOUNTER — TELEPHONE (OUTPATIENT)
Facility: CLINIC | Age: 45
End: 2025-05-27
Payer: COMMERCIAL

## 2025-05-27 NOTE — TELEPHONE ENCOUNTER
Spoke with patients wife, PET was scheduled for today at [facility], however was canceled because insurance would not approve.  They are In communication with Dr. Gomez's office to see what additional information is needed for approval.  Informed them that Dr. Calabrese would like to see him to discuss course of treatment and get stat MRI/CT ordered if insurance company upholds the denial. Sonam is going to reach back out to our office today and provide a status of PET.

## 2025-05-27 NOTE — TELEPHONE ENCOUNTER
Received call back from Sonam, PET is approved and rescheduled for June 3rd at [place].

## 2025-05-27 NOTE — TELEPHONE ENCOUNTER
I left voice message for pt regarding confirming appt and completing labs prior to appt w/ Dr. Calabrese on 6/4/2025. Left number for pt to contact the office, if they have any questions, would like to confirm/ reschedule. Also, I would have to schedule pt's lab appt @ a [facility], because they are no longer allowing walk ins.

## 2025-05-29 ENCOUNTER — OFFICE VISIT (OUTPATIENT)
Dept: SURGERY | Facility: CLINIC | Age: 45
End: 2025-05-29
Payer: COMMERCIAL

## 2025-05-29 VITALS — HEART RATE: 77 BPM | SYSTOLIC BLOOD PRESSURE: 138 MMHG | DIASTOLIC BLOOD PRESSURE: 90 MMHG

## 2025-05-29 DIAGNOSIS — C78.7 RECTAL ADENOCARCINOMA METASTATIC TO LIVER: Primary | ICD-10-CM

## 2025-05-29 DIAGNOSIS — Z95.828 PORT-A-CATH IN PLACE: ICD-10-CM

## 2025-05-29 DIAGNOSIS — C20 RECTAL ADENOCARCINOMA METASTATIC TO LIVER: Primary | ICD-10-CM

## 2025-05-29 PROCEDURE — 99999 PR PBB SHADOW E&M-EST. PATIENT-LVL III: CPT | Mod: PBBFAC,,, | Performed by: SURGERY

## 2025-05-29 RX ORDER — OMEPRAZOLE 40 MG/1
1 CAPSULE, DELAYED RELEASE ORAL EVERY MORNING
COMMUNITY

## 2025-05-29 NOTE — PROGRESS NOTES
Subjective:       Patient ID: Yong Arenas is a 45 y.o. male.    Chief Complaint: Procedure      HPI:  45 year old male referred in consultation to have his port a cath removed. He had left subclavian port placed one year ago to start chemotherapy for advanced rectal cancer - port head tunneled to the right chest wall. He completed his chemo and radiation treatment and has had surgery. He noticed increased pain in the right shoulder with port usage. Port contrast study showed fracture in the port near the clavicle.     Past Medical History:   Diagnosis Date    Back pain     Cervical pain     GERD (gastroesophageal reflux disease)     HLD (hyperlipidemia)     Hypertension     Rectal cancer 2024    Tinnitus, unspecified ear      Past Surgical History:   Procedure Laterality Date    EGD, WITH BALLOON DILATION OF LESS THAN 30 MILLIMETERS      INSERTION OF TUNNELED CENTRAL VENOUS CATHETER (CVC) WITH SUBCUTANEOUS PORT Left 2024    Procedure: INSERTION, PORT-A-CATH;  Surgeon: Tash Castellanos MD;  Location: [facility];  Service: General;  Laterality: Left;  port placed to right chest , tunneled over to left chest, subclavian    WISDOM TOOTH EXTRACTION      x2     Review of patient's allergies indicates:  No Known Allergies  Medication List with Changes/Refills   Current Medications    AMLODIPINE (NORVASC) 5 MG TABLET    Take 5 mg by mouth once daily.    CELECOXIB (CELEBREX) 200 MG CAPSULE    SMARTSI Capsule(s) By Mouth Every 12 Hours PRN    HYDRALAZINE (APRESOLINE) 25 MG TABLET    Take 1 tablet (25 mg total) by mouth 3 (three) times daily as needed (Severely elevated blood pressure above 190/100).    IBUPROFEN (ADVIL,MOTRIN) 200 MG TABLET    Take 200 mg by mouth every 6 (six) hours as needed for Pain.    LORATADINE-PSEUDOEPHEDRINE 5-120 MG (CLARITIN-D 12 HOUR) 5-120 MG PER TABLET    Take 1 tablet by mouth daily as needed.    LOSARTAN (COZAAR) 100 MG TABLET    Take 1 tablet by mouth once daily.    OMEPRAZOLE  (PRILOSEC) 40 MG CAPSULE    Take 1 capsule by mouth every morning.    PROMETHAZINE (PHENERGAN) 25 MG TABLET    Take 1 tablet (25 mg total) by mouth every 4 to 6 hours as needed for Nausea.    TESTOSTERONE CYPIONATE (DEPOTESTOTERONE CYPIONATE) 200 MG/ML INJECTION    Inject 1 mL into the muscle every 14 (fourteen) days.   Discontinued Medications    OMEPRAZOLE (PRILOSEC) 40 MG CAPSULE    Take 1 capsule by mouth every morning.     Family History   Problem Relation Name Age of Onset    Cancer Mother          liposarcoma on leg    Prostate cancer Father      Colon polyps Father      Leukemia Niece       Social History     Socioeconomic History    Marital status:    Tobacco Use    Smoking status: Former     Types: Cigarettes     Start date:      Quit date:      Years since quittin.4    Smokeless tobacco: Never   Substance and Sexual Activity    Alcohol use: Not Currently     Comment: social    Drug use: Never     Social Drivers of Health     Physical Activity: Unknown (10/30/2024)    [facility]    Exercise Vital Sign     Days of Exercise per Week: 5 days         Review of Systems   Constitutional:  Negative for appetite change, chills, fever and unexpected weight change.   HENT:  Negative for hearing loss, rhinorrhea, sore throat and voice change.    Eyes:  Negative for photophobia and visual disturbance.   Respiratory:  Negative for cough, choking and shortness of breath.    Cardiovascular:  Negative for chest pain, palpitations and leg swelling.   Gastrointestinal:  Negative for abdominal pain, blood in stool, constipation, diarrhea, nausea and vomiting.   Endocrine: Negative for cold intolerance, heat intolerance, polydipsia and polyuria.   Musculoskeletal:  Negative for arthralgias, back pain, joint swelling and neck stiffness.   Skin:  Negative for color change, pallor and rash.   Neurological:  Negative for dizziness, seizures, syncope and headaches.   Hematological:  Negative for  adenopathy. Does not bruise/bleed easily.   Psychiatric/Behavioral:  Negative for agitation, behavioral problems and confusion.        Objective:      Physical Exam  Constitutional:       General: He is not in acute distress.     Appearance: He is not toxic-appearing.   Pulmonary:      Effort: No tachypnea, bradypnea or respiratory distress.   Chest:          Comments: Port head is in the right chest wall but the entry for the catheter is the left subclavian.   Abdominal:      General: There is no distension.      Palpations: Abdomen is soft.   Neurological:      Mental Status: He is alert and oriented to person, place, and time.   Psychiatric:         Behavior: Behavior is cooperative.         Assessment/Plan:   Rectal adenocarcinoma metastatic to liver  -     Removal, Portacath    Port-A-Cath in place  -     Ambulatory referral/consult to General Surgery  -     Removal, Portacath      Port a cath was removed in the office today. The cater was damaged with a fracture but the entire catheter was removed. Tolerated procedure well. If new port is ever needed, he will return to preop for new port. Otherwise RTC PRN     I discussed the proposed procedures with the patient including risks, benefits, indications, alternatives and special concerns.  The patient appears to understand and agrees to go ahead with surgery.  I have made no promises, warranties or verbal agreements beyond what was discussed above.    No follow-ups on file.

## 2025-05-29 NOTE — PROCEDURES
Removal, Portacath    Date/Time: 5/29/2025 8:00 AM    Performed by: Anthony Payton III, MD  Authorized by: Anthony Payton III, MD    Prep: patient was prepped and draped in usual sterile fashion    Local anesthesia used?: Yes    Anesthesia:  Local infiltration  Local anesthetic:  Lidocaine 1% without epinephrine  Anesthetic total (ml):  10  Indications:  Port-a-cath  Body area:  Chest  Laterality:  Right  Position:  Supine  Anesthesia:  Local infiltration  Local anesthetic:  Lidocaine 1% without epinephrine  Excision type:  Skin  Excision size (cm):  4  Scalpel size:  15  Incision type:  Single straight  Specimens?: No     Hemostasis was obtained.  Estimated blood loss (cc):  5  Wound closure:  Simple  Sutures:  4-0 Monocryl   Patient tolerated the procedure well with no immediate complications.   Post-operative instructions were provided for the patient.  Comments:      The port site in the right chest wall was prepped and draped.  Local anesthetic was infiltrated along the previous incision.  An incision was made into the previous scar.  Dissection was carried down to the port which was superficial in the subcutaneous tissue.  The fibrous capsule around the port was entered and the Prolene sutures were cut and removed.  The port and catheter were then removed from the chest wall and vein without trouble. The catheter was split about 50 percent of the circumference at the subclavian stenosed site. The entire catheter was removed.  Pressure was applied.   The skin was closed with 4-0 Monocryl.  He tolerated the procedure well.

## 2025-05-30 ENCOUNTER — TELEPHONE (OUTPATIENT)
Facility: CLINIC | Age: 45
End: 2025-05-30
Payer: COMMERCIAL

## 2025-06-03 NOTE — PROGRESS NOTES
[facility] [address] Hematology Oncology In Office Subsequent Encounter Note    6/4/25    Subjective:       Patient ID: Yong Arenas is a 45 y.o. male returning today for a regularly scheduled follow-up visit.    Chief Complaint: Rectal Cancer with liver mets.       HPI  Rectal cancer, liver metastases, LN.  Treated with chemo.  Folfox Avastin x's 5.  PET MRI slight decrease in rectal mass, larger liver masses resolved, smaller nodules no change.      He has completed the radiation therapy to the pelvis and the concurrent weekly 5 FU infusional chemotherapy.  Dr. Arellano has been his treating radiation physician.    He has completed 5 cycles of chemotherapy with Avastin.  He will resume his chemotherapy and Avastin on September 9, September 23rd, and October 7th to complete 8 cycles of chemotherapy.  Avastin will be held from course number 8 on October 7th in anticipation of his upcoming surgery.    Plan to get MRI of abdomen and pelvis and PET scan around October 14th/15th.  Anticipate that his surgery would be accomplished after October 23rd.  Dr. Rudd had seen him for evaluation of liver metastasectomy and Dr. Sanchez has seen him for rectal surgery.  It is unclear if he will need a low anterior resection or APR.    He is 224 lb and 6 ft 3 in tall.  He is due to have 2 teeth pulled electively and a placement of 2 posts is anticipated with some type of implant or bridge.  This was due to be placed around October 31,  But may be deferred 2 or 3 weeks until after his surgery.    S/P 6 of 8 cycles,  He has some PN sx in his fingertips and toes, should resolve after chemo completed.    Chemo given with out avastin.  PET and MRI no hypermetabolism in liver masses, residual nodules seen in liver, smaller, rectal mass 50% reduction.  He met with Dr. Zamora, to be presented to [facility] Tumor board.  MD advised APR, ostomy.  Pt's insurance lapses 11/17/24.  Pt wants to go for 2nd opinion.  Discussed with Dr. Goldsmith,  will work on this.  Check U/S of liver.    Bracey to have hemangioma in liver.  Pt refused APR and LAR surgery because he would not agree to ostomy if needed.  He had transanal  resection of the cancer mass on Friday 11/15/24.  Path is pending.  I discussed with Dr. Paola Betts.  [facility].    Pending ctDNA and path report.  S/P 8 cycles of chemo. And chemo/Rad Rx.  He has developed an anal fissure and is due for a flexible sigmoidoscopy per Dr. Vides January 20, 2025.    His CEA has gone from 4.6-7.6 and will be repeated to check for any trend.  MRI scan showed postoperative change without mass being seen.  This was done March 12, 2025.  PET scan was not approved per his insurance carrier.  He is due to see Dr. Ellis on June. 2025.    CT DNA was negative.    June 4, 2025.  The CEA had increased from 7 up to 17 and is now at 48.  An upward trend.   And Mrs. Arenas and I reviewed PET scans and MRI scans over the last several months.  On current PET scan pulmonary nodules are slightly larger but not hypermetabolic.  There is a questionable process at the liver and the pelvic area appears stable with postoperative change.  I do not think any of these areas clearly identify recurrent rectal cancer.  He is due to have a colonoscopy next week per his surgeon.  I would plan to repeat the MRI of the liver as our next plan.          Past Medical History:   Diagnosis Date    Back pain     Cervical pain     GERD (gastroesophageal reflux disease)     HLD (hyperlipidemia)     Hypertension     Rectal cancer 03/2024    Tinnitus, unspecified ear        Past Surgical History:   Procedure Laterality Date    EGD, WITH BALLOON DILATION OF LESS THAN 30 MILLIMETERS  2020    INSERTION OF TUNNELED CENTRAL VENOUS CATHETER (CVC) WITH SUBCUTANEOUS PORT Left 4/9/2024    Procedure: INSERTION, PORT-A-CATH;  Surgeon: Tash Castellanos MD;  Location: [facility];  Service: General;  Laterality: Left;  port placed to  right chest , tunneled over to left chest, subclavian    WISDOM TOOTH EXTRACTION      x2       Social History     Socioeconomic History    Marital status:    Tobacco Use    Smoking status: Former     Types: Cigarettes     Start date:      Quit date:      Years since quittin.4    Smokeless tobacco: Never   Substance and Sexual Activity    Alcohol use: Not Currently     Comment: social    Drug use: Never     Social Drivers of Health     Physical Activity: Unknown (10/30/2024)    Received from [facility]    Exercise Vital Sign     Days of Exercise per Week: 5 days       Family History   Problem Relation Name Age of Onset    Cancer Mother          liposarcoma on leg    Prostate cancer Father      Colon polyps Father      Leukemia Niece         Review of patient's allergies indicates:  No Known Allergies    Current Outpatient Medications:     amLODIPine (NORVASC) 5 MG tablet, Take 5 mg by mouth once daily., Disp: , Rfl:     celecoxib (CELEBREX) 200 MG capsule, SMARTSI Capsule(s) By Mouth Every 12 Hours PRN, Disp: , Rfl:     hydrALAZINE (APRESOLINE) 25 MG tablet, Take 1 tablet (25 mg total) by mouth 3 (three) times daily as needed (Severely elevated blood pressure above 190/100)., Disp: 30 tablet, Rfl: 0    loratadine-pseudoephedrine 5-120 mg (CLARITIN-D 12 HOUR) 5-120 mg per tablet, Take 1 tablet by mouth daily as needed., Disp: , Rfl:     losartan (COZAAR) 100 MG tablet, Take 1 tablet by mouth once daily., Disp: , Rfl:     omeprazole (PRILOSEC) 40 MG capsule, Take 1 capsule by mouth every morning., Disp: , Rfl:     promethazine (PHENERGAN) 25 MG tablet, Take 1 tablet (25 mg total) by mouth every 4 to 6 hours as needed for Nausea., Disp: 30 tablet, Rfl: 2    testosterone cypionate (DEPOTESTOTERONE CYPIONATE) 200 mg/mL injection, Inject 1 mL into the muscle every 14 (fourteen) days., Disp: , Rfl:     ibuprofen (ADVIL,MOTRIN) 200 MG tablet, Take 200 mg by mouth every 6 (six) hours as needed for  "Pain. (Patient not taking: Reported on 6/4/2025), Disp: , Rfl:     All medications and past history have been reviewed.    Review of Systems   Constitutional:  Positive for fatigue. Negative for activity change, appetite change and fever.   HENT:  Negative for congestion, mouth sores, postnasal drip, rhinorrhea, sinus pressure, sore throat and trouble swallowing.    Eyes:  Negative for photophobia and visual disturbance.   Respiratory:  Negative for cough, chest tightness, shortness of breath and wheezing.    Cardiovascular:  Negative for chest pain and leg swelling.   Gastrointestinal:  Negative for abdominal distention, abdominal pain, constipation, diarrhea, nausea and vomiting.   Endocrine: Negative for cold intolerance.   Genitourinary:  Negative for decreased urine volume, dysuria and frequency.   Musculoskeletal:  Negative for arthralgias and myalgias.   Skin:  Negative for pallor and rash.   Allergic/Immunologic: Negative for immunocompromised state.   Neurological:  Negative for dizziness, syncope, weakness, light-headedness, numbness and headaches.   Hematological:  Negative for adenopathy. Does not bruise/bleed easily.   Psychiatric/Behavioral:  Negative for sleep disturbance. The patient is not nervous/anxious.        Objective:        BP (!) 141/83   Pulse 90   Temp 98.3 °F (36.8 °C) (Temporal)   Resp 18   Ht 6' 3" (1.905 m)   Wt 104.5 kg (230 lb 6.4 oz)   SpO2 97%   BMI 28.80 kg/m²     Physical Exam  Constitutional:       Appearance: Normal appearance.   HENT:      Head: Normocephalic and atraumatic.      Mouth/Throat:      Mouth: Mucous membranes are moist.   Cardiovascular:      Rate and Rhythm: Normal rate and regular rhythm.      Pulses: Normal pulses.      Heart sounds: Normal heart sounds.   Pulmonary:      Effort: Pulmonary effort is normal. No respiratory distress.      Breath sounds: Normal breath sounds. No wheezing.   Abdominal:      General: There is no distension.      Palpations: " Abdomen is soft. There is no mass.      Tenderness: There is no abdominal tenderness.   Musculoskeletal:         General: No swelling. Normal range of motion.      Right lower leg: No edema.      Left lower leg: No edema.   Skin:     General: Skin is warm and dry.      Capillary Refill: Capillary refill takes 2 to 3 seconds.      Findings: No bruising or rash.   Neurological:      Mental Status: He is alert and oriented to person, place, and time. Mental status is at baseline.      Motor: No weakness.   Psychiatric:         Mood and Affect: Mood normal.         Behavior: Behavior normal.             No results found for this or any previous visit (from the past 2 weeks).    CMP  Sodium   Date Value Ref Range Status   05/21/2025 139 136 - 145 mmol/L Final     Potassium   Date Value Ref Range Status   05/21/2025 3.8 3.5 - 5.1 mmol/L Final     Chloride   Date Value Ref Range Status   05/21/2025 105 95 - 110 mmol/L Final     CO2   Date Value Ref Range Status   05/21/2025 25 23 - 29 mmol/L Final     Glucose   Date Value Ref Range Status   05/21/2025 118 (H) 70 - 110 mg/dL Final     BUN   Date Value Ref Range Status   05/21/2025 19 6 - 20 mg/dL Final     Creatinine   Date Value Ref Range Status   05/21/2025 1.0 0.5 - 1.4 mg/dL Final     Calcium   Date Value Ref Range Status   05/21/2025 9.3 8.7 - 10.5 mg/dL Final     Protein Total   Date Value Ref Range Status   05/21/2025 7.1 6.0 - 8.4 gm/dL Final     Albumin   Date Value Ref Range Status   05/21/2025 4.3 3.5 - 5.2 g/dL Final     Bilirubin Total   Date Value Ref Range Status   05/21/2025 0.4 0.1 - 1.0 mg/dL Final     Comment:     For infants and newborns, interpretation of results should be based   on gestational age, weight and in agreement with clinical   observations.    Premature Infant recommended reference ranges:   0-24 hours:  <8.0 mg/dL   24-48 hours: <12.0 mg/dL   3-5 days:    <15.0 mg/dL   6-29 days:   <15.0 mg/dL     ALP   Date Value Ref Range Status    05/21/2025 78 55 - 135 unit/L Final     AST   Date Value Ref Range Status   05/21/2025 29 10 - 40 unit/L Final     ALT   Date Value Ref Range Status   05/21/2025 41 10 - 44 unit/L Final     Anion Gap   Date Value Ref Range Status   05/21/2025 9 8 - 16 mmol/L Final                Plan is to discuss and decide on timing of chemo further, Rad Rx and surgery.    Addendum:  Coordinated care with Dr. Rudd, colorectal surgery and Dr. Arellano and myself.    Completed pelvic irradiation and 5FU infusion.    Folfox Avastin.    MRI and PET as per reportsand HPI above.    Followed by surgical re evaluation. In progress with Dr. Zamora and [facility].    Being considered for intrahepatic TACE Rx, liver transplant?  Dr. Grubbs.    S/P transanal resection of tumor mass.      Observation for now.  CT DNA was negative and will be repeated in 4 weeks.   MRI scan showed postop change.      For colonoscopy  For repeat liver protocol MRI.    Bran Calabrese MD  [facility]  6/4/25

## 2025-06-04 ENCOUNTER — OFFICE VISIT (OUTPATIENT)
Facility: CLINIC | Age: 45
End: 2025-06-04
Payer: COMMERCIAL

## 2025-06-04 VITALS
OXYGEN SATURATION: 97 % | HEIGHT: 75 IN | WEIGHT: 230.38 LBS | DIASTOLIC BLOOD PRESSURE: 83 MMHG | TEMPERATURE: 98 F | BODY MASS INDEX: 28.65 KG/M2 | RESPIRATION RATE: 18 BRPM | SYSTOLIC BLOOD PRESSURE: 141 MMHG | HEART RATE: 90 BPM

## 2025-06-04 DIAGNOSIS — C20 RECTAL ADENOCARCINOMA METASTATIC TO LIVER: Primary | ICD-10-CM

## 2025-06-04 DIAGNOSIS — C78.7 RECTAL ADENOCARCINOMA METASTATIC TO LIVER: Primary | ICD-10-CM

## 2025-06-04 DIAGNOSIS — C78.7 METASTASIS TO LIVER: ICD-10-CM

## 2025-06-04 PROCEDURE — 4010F ACE/ARB THERAPY RXD/TAKEN: CPT | Mod: CPTII,S$GLB,, | Performed by: INTERNAL MEDICINE

## 2025-06-04 PROCEDURE — 3079F DIAST BP 80-89 MM HG: CPT | Mod: CPTII,S$GLB,, | Performed by: INTERNAL MEDICINE

## 2025-06-04 PROCEDURE — 99215 OFFICE O/P EST HI 40 MIN: CPT | Mod: S$GLB,,, | Performed by: INTERNAL MEDICINE

## 2025-06-04 PROCEDURE — 3077F SYST BP >= 140 MM HG: CPT | Mod: CPTII,S$GLB,, | Performed by: INTERNAL MEDICINE

## 2025-06-04 PROCEDURE — 3008F BODY MASS INDEX DOCD: CPT | Mod: CPTII,S$GLB,, | Performed by: INTERNAL MEDICINE

## 2025-06-04 PROCEDURE — G2211 COMPLEX E/M VISIT ADD ON: HCPCS | Mod: S$GLB,,, | Performed by: INTERNAL MEDICINE

## 2025-06-04 PROCEDURE — 1159F MED LIST DOCD IN RCRD: CPT | Mod: CPTII,S$GLB,, | Performed by: INTERNAL MEDICINE

## 2025-06-04 PROCEDURE — 99999 PR PBB SHADOW E&M-EST. PATIENT-LVL IV: CPT | Mod: PBBFAC,,, | Performed by: INTERNAL MEDICINE

## 2025-06-25 ENCOUNTER — LAB VISIT (OUTPATIENT)
Dept: LAB | Facility: HOSPITAL | Age: 45
End: 2025-06-25
Attending: INTERNAL MEDICINE
Payer: COMMERCIAL

## 2025-06-25 DIAGNOSIS — C20 ADENOCARCINOMA OF RECTUM: ICD-10-CM

## 2025-06-25 DIAGNOSIS — D70.1 CHEMOTHERAPY INDUCED NEUTROPENIA: ICD-10-CM

## 2025-06-25 DIAGNOSIS — C78.7 METASTASIS TO LIVER: ICD-10-CM

## 2025-06-25 DIAGNOSIS — C20 RECTAL ADENOCARCINOMA METASTATIC TO LIVER: ICD-10-CM

## 2025-06-25 DIAGNOSIS — E86.0 DEHYDRATION: ICD-10-CM

## 2025-06-25 DIAGNOSIS — T45.1X5A CHEMOTHERAPY INDUCED NEUTROPENIA: ICD-10-CM

## 2025-06-25 DIAGNOSIS — C78.7 RECTAL CANCER METASTASIZED TO LIVER: ICD-10-CM

## 2025-06-25 DIAGNOSIS — C78.7 RECTAL ADENOCARCINOMA METASTATIC TO LIVER: ICD-10-CM

## 2025-06-25 DIAGNOSIS — C20 RECTAL CANCER METASTASIZED TO LIVER: ICD-10-CM

## 2025-06-25 LAB
ABSOLUTE EOSINOPHIL (SMH): 0.26 K/UL
ABSOLUTE MONOCYTE (SMH): 0.27 K/UL (ref 0.3–1)
ABSOLUTE NEUTROPHIL COUNT (SMH): 1.2 K/UL (ref 1.8–7.7)
ALBUMIN SERPL-MCNC: 4 G/DL (ref 3.5–5.2)
ALP SERPL-CCNC: 137 UNIT/L (ref 55–135)
ALT SERPL-CCNC: 83 UNIT/L (ref 10–44)
ANION GAP (SMH): 7 MMOL/L (ref 8–16)
AST SERPL-CCNC: 46 UNIT/L (ref 10–40)
BASOPHILS # BLD AUTO: 0.03 K/UL
BASOPHILS NFR BLD AUTO: 0.7 %
BILIRUB SERPL-MCNC: 0.6 MG/DL (ref 0.1–1)
BUN SERPL-MCNC: 13 MG/DL (ref 6–20)
CALCIUM SERPL-MCNC: 9.2 MG/DL (ref 8.7–10.5)
CARCINOEMBRYONIC ANTIGEN (SMH): 51.2 NG/ML
CHLORIDE SERPL-SCNC: 107 MMOL/L (ref 95–110)
CO2 SERPL-SCNC: 28 MMOL/L (ref 23–29)
CREAT SERPL-MCNC: 1 MG/DL (ref 0.5–1.4)
ERYTHROCYTE [DISTWIDTH] IN BLOOD BY AUTOMATED COUNT: 13.6 % (ref 11.5–14.5)
GFR SERPLBLD CREATININE-BSD FMLA CKD-EPI: >60 ML/MIN/1.73/M2
GLUCOSE SERPL-MCNC: 94 MG/DL (ref 70–110)
HCT VFR BLD AUTO: 34.4 % (ref 40–54)
HGB BLD-MCNC: 12 GM/DL (ref 14–18)
IMM GRANULOCYTES # BLD AUTO: 0.05 K/UL (ref 0–0.04)
IMM GRANULOCYTES NFR BLD AUTO: 1.2 % (ref 0–0.5)
LYMPHOCYTES # BLD AUTO: 2.33 K/UL (ref 1–4.8)
MCH RBC QN AUTO: 29.8 PG (ref 27–31)
MCHC RBC AUTO-ENTMCNC: 34.9 G/DL (ref 32–36)
MCV RBC AUTO: 85 FL (ref 82–98)
NUCLEATED RBC (/100WBC) (SMH): 0 /100 WBC
PLATELET # BLD AUTO: 185 K/UL (ref 150–450)
PMV BLD AUTO: 8 FL (ref 9.2–12.9)
POTASSIUM SERPL-SCNC: 4.3 MMOL/L (ref 3.5–5.1)
PROT SERPL-MCNC: 6.7 GM/DL (ref 6–8.4)
RBC # BLD AUTO: 4.03 M/UL (ref 4.6–6.2)
RELATIVE EOSINOPHIL (SMH): 6.4 % (ref 0–8)
RELATIVE LYMPHOCYTE (SMH): 57 % (ref 18–48)
RELATIVE MONOCYTE (SMH): 6.6 % (ref 4–15)
RELATIVE NEUTROPHIL (SMH): 28.1 % (ref 38–73)
SODIUM SERPL-SCNC: 142 MMOL/L (ref 136–145)
WBC # BLD AUTO: 4.09 K/UL (ref 3.9–12.7)

## 2025-06-25 PROCEDURE — 82378 CARCINOEMBRYONIC ANTIGEN: CPT

## 2025-06-25 PROCEDURE — 85025 COMPLETE CBC W/AUTO DIFF WBC: CPT

## 2025-06-25 PROCEDURE — 80053 COMPREHEN METABOLIC PANEL: CPT

## 2025-06-25 PROCEDURE — 36415 COLL VENOUS BLD VENIPUNCTURE: CPT

## 2025-06-26 ENCOUNTER — PATIENT MESSAGE (OUTPATIENT)
Facility: CLINIC | Age: 45
End: 2025-06-26
Payer: COMMERCIAL

## 2025-06-27 ENCOUNTER — TELEPHONE (OUTPATIENT)
Facility: CLINIC | Age: 45
End: 2025-06-27
Payer: COMMERCIAL

## 2025-06-27 NOTE — TELEPHONE ENCOUNTER
Reviewed ctDNA with Dr Samayoa and confirmed when he would like patient to come in again. Per Dr Samayoa, if patient wants to wait until after upcoming liver biopsy, okay to schedule around 7/24/2025. If patient wants to be seen sooner, schedule around 7/10/2025. Spoke with patient's wife and she states to schedule patient around 7/24/2025 and they will be there, no need to call with appointment. Ms Tee made aware of need for appointment.

## 2025-07-25 ENCOUNTER — TELEPHONE (OUTPATIENT)
Facility: CLINIC | Age: 45
End: 2025-07-25
Payer: COMMERCIAL

## (undated) DEVICE — ELECTRODE REM PLYHSV RETURN 9

## (undated) DEVICE — GLOVE BIOGEL PI MICRO SZ 7

## (undated) DEVICE — DRAPE C ARM 42 X 120 10/BX

## (undated) DEVICE — SUT STRATAFIX 4-0 30CM PS-2

## (undated) DEVICE — ELECTRODE BLADE INSULATED 1 IN

## (undated) DEVICE — DECANTER FLUID TRNSF WHITE 9IN

## (undated) DEVICE — SUT 4-0 VICRYL / SH

## (undated) DEVICE — DRESSING TRNSPAR 2.375X2.75

## (undated) DEVICE — CHLORAPREP 10.5 ML APPLICATOR

## (undated) DEVICE — STAPLER SKIN PROXIMATE WIDE

## (undated) DEVICE — SYR 10CC LUER LOCK

## (undated) DEVICE — ADHESIVE DERMABOND MINI HV

## (undated) DEVICE — IMPLANTABLE DEVICE
Type: IMPLANTABLE DEVICE | Site: CHEST | Status: NON-FUNCTIONAL
Removed: 2024-04-09

## (undated) DEVICE — DRESSING TRANS 4X4 TEGADERM

## (undated) DEVICE — DRAPE THYROID SOFT STERILE

## (undated) DEVICE — SPONGE GZ WOVEN 12-PLY 4X4IN

## (undated) DEVICE — BLADE SURG CARBON STEEL SZ11

## (undated) DEVICE — SUT SILK 3-0 SH 18IN BLACK

## (undated) DEVICE — SOL NACL IRR 1000ML BTL

## (undated) DEVICE — NDL ECLIPSE SAFETY 23G 1.5IN